# Patient Record
Sex: MALE | Race: WHITE | NOT HISPANIC OR LATINO | Employment: OTHER | URBAN - METROPOLITAN AREA
[De-identification: names, ages, dates, MRNs, and addresses within clinical notes are randomized per-mention and may not be internally consistent; named-entity substitution may affect disease eponyms.]

---

## 2023-06-20 LAB
ALBUMIN (G/DL) IN SER/PLAS: 3.9 G/DL (ref 3.4–5)
ALBUMIN (MG/L) IN URINE: 43.4 MG/L
ALBUMIN/CREATININE (UG/MG) IN URINE: 34.7 UG/MG CRT (ref 0–30)
ANION GAP IN SER/PLAS: 14 MMOL/L (ref 10–20)
CALCIDIOL (25 OH VITAMIN D3) (NG/ML) IN SER/PLAS: 76 NG/ML
CALCIUM (MG/DL) IN SER/PLAS: 9.3 MG/DL (ref 8.6–10.6)
CARBON DIOXIDE, TOTAL (MMOL/L) IN SER/PLAS: 21 MMOL/L (ref 21–32)
CHLORIDE (MMOL/L) IN SER/PLAS: 114 MMOL/L (ref 98–107)
CREATININE (MG/DL) IN SER/PLAS: 4.09 MG/DL (ref 0.5–1.3)
CREATININE (MG/DL) IN URINE: 125 MG/DL (ref 20–370)
ERYTHROCYTE DISTRIBUTION WIDTH (RATIO) BY AUTOMATED COUNT: 13.7 % (ref 11.5–14.5)
ERYTHROCYTE MEAN CORPUSCULAR HEMOGLOBIN CONCENTRATION (G/DL) BY AUTOMATED: 31.9 G/DL (ref 32–36)
ERYTHROCYTE MEAN CORPUSCULAR VOLUME (FL) BY AUTOMATED COUNT: 102 FL (ref 80–100)
ERYTHROCYTES (10*6/UL) IN BLOOD BY AUTOMATED COUNT: 3.73 X10E12/L (ref 4.5–5.9)
GFR MALE: 14 ML/MIN/1.73M2
GLUCOSE (MG/DL) IN SER/PLAS: 106 MG/DL (ref 74–99)
HEMATOCRIT (%) IN BLOOD BY AUTOMATED COUNT: 37.9 % (ref 41–52)
HEMOGLOBIN (G/DL) IN BLOOD: 12.1 G/DL (ref 13.5–17.5)
LEUKOCYTES (10*3/UL) IN BLOOD BY AUTOMATED COUNT: 9.1 X10E9/L (ref 4.4–11.3)
NRBC (PER 100 WBCS) BY AUTOMATED COUNT: 0 /100 WBC (ref 0–0)
PARATHYRIN INTACT (PG/ML) IN SER/PLAS: 213.7 PG/ML (ref 18.5–88)
PHOSPHATE (MG/DL) IN SER/PLAS: 3.3 MG/DL (ref 2.5–4.9)
PLATELETS (10*3/UL) IN BLOOD AUTOMATED COUNT: 170 X10E9/L (ref 150–450)
POTASSIUM (MMOL/L) IN SER/PLAS: 5.2 MMOL/L (ref 3.5–5.3)
SODIUM (MMOL/L) IN SER/PLAS: 144 MMOL/L (ref 136–145)
UREA NITROGEN (MG/DL) IN SER/PLAS: 59 MG/DL (ref 6–23)

## 2023-08-16 ENCOUNTER — HOSPITAL ENCOUNTER (OUTPATIENT)
Dept: DATA CONVERSION | Facility: HOSPITAL | Age: 78
Discharge: HOME | End: 2023-08-16
Payer: MEDICARE

## 2023-08-17 DIAGNOSIS — N20.1 CALCULUS OF URETER: ICD-10-CM

## 2023-08-24 ENCOUNTER — HOSPITAL ENCOUNTER (OUTPATIENT)
Dept: DATA CONVERSION | Facility: HOSPITAL | Age: 78
Discharge: HOME | End: 2023-08-24
Payer: MEDICARE

## 2023-08-24 DIAGNOSIS — N20.1 CALCULUS OF URETER: ICD-10-CM

## 2023-08-24 DIAGNOSIS — N13.5 CROSSING VESSEL AND STRICTURE OF URETER WITHOUT HYDRONEPHROSIS: ICD-10-CM

## 2023-08-24 PROBLEM — K92.1 MELENA: Status: ACTIVE | Noted: 2023-08-24

## 2023-08-24 PROBLEM — C61 ADENOCARCINOMA OF PROSTATE (MULTI): Status: ACTIVE | Noted: 2023-08-24

## 2023-08-24 PROBLEM — D50.0 ANEMIA DUE TO BLOOD LOSS: Status: ACTIVE | Noted: 2023-08-24

## 2023-08-24 PROBLEM — N40.0 BENIGN PROSTATIC HYPERPLASIA: Status: ACTIVE | Noted: 2023-08-24

## 2023-08-24 PROBLEM — K26.9 DUODENAL ULCER: Status: ACTIVE | Noted: 2023-08-24

## 2023-08-24 PROBLEM — N21.1 CALCULUS IN URETHRA: Status: ACTIVE | Noted: 2023-08-24

## 2023-08-24 PROBLEM — I49.5 SINUS NODE DYSFUNCTION (MULTI): Status: ACTIVE | Noted: 2023-08-24

## 2023-08-24 PROBLEM — K27.9 PEPTIC ULCER DISEASE: Status: ACTIVE | Noted: 2023-08-24

## 2023-08-24 PROBLEM — Z95.0 PRESENCE OF CARDIAC PACEMAKER: Status: ACTIVE | Noted: 2023-08-24

## 2023-08-24 PROBLEM — Z72.0 TOBACCO USE: Status: ACTIVE | Noted: 2023-08-24

## 2023-08-24 PROBLEM — K25.9 STOMACH ULCER: Status: ACTIVE | Noted: 2023-08-24

## 2023-08-24 PROBLEM — K21.9 GASTROESOPHAGEAL REFLUX DISEASE: Status: ACTIVE | Noted: 2023-08-24

## 2023-08-24 PROBLEM — Z86.69 HISTORY OF HEMIPARESIS: Status: ACTIVE | Noted: 2023-08-24

## 2023-08-24 PROBLEM — R31.9 HEMATURIA: Status: ACTIVE | Noted: 2023-08-24

## 2023-08-24 PROBLEM — E78.5 DYSLIPIDEMIA: Status: ACTIVE | Noted: 2023-08-24

## 2023-08-24 PROBLEM — E78.5 HYPERLIPIDEMIA: Status: ACTIVE | Noted: 2023-08-24

## 2023-08-24 PROBLEM — G35 MULTIPLE SCLEROSIS (MULTI): Status: ACTIVE | Noted: 2023-08-24

## 2023-08-24 PROBLEM — N28.9 ABNORMAL RENAL FUNCTION: Status: ACTIVE | Noted: 2023-08-24

## 2023-08-24 RX ORDER — TAMSULOSIN HYDROCHLORIDE 0.4 MG/1
1 CAPSULE ORAL NIGHTLY
COMMUNITY
Start: 2018-09-05

## 2023-08-24 RX ORDER — CHOLECALCIFEROL (VITAMIN D3) 25 MCG
25 TABLET ORAL DAILY
COMMUNITY

## 2023-08-24 RX ORDER — OMEPRAZOLE 20 MG/1
20 TABLET, DELAYED RELEASE ORAL DAILY
COMMUNITY

## 2023-08-24 RX ORDER — MULTIVIT-MIN/FA/LYCOPEN/LUTEIN .4-300-25
1 TABLET ORAL DAILY
COMMUNITY

## 2023-08-24 RX ORDER — ROSUVASTATIN CALCIUM 10 MG/1
10 TABLET, COATED ORAL DAILY
COMMUNITY

## 2023-08-24 RX ORDER — ROSUVASTATIN CALCIUM 40 MG/1
1 TABLET, COATED ORAL NIGHTLY
COMMUNITY
Start: 2020-06-15

## 2023-08-24 RX ORDER — FERROUS SULFATE 325(65) MG
TABLET ORAL
COMMUNITY
Start: 2022-07-20

## 2023-08-24 RX ORDER — FAMOTIDINE 20 MG/1
20 TABLET, FILM COATED ORAL NIGHTLY
COMMUNITY

## 2023-08-29 LAB
ALANINE AMINOTRANSFERASE (SGPT) (U/L) IN SER/PLAS: 5 U/L (ref 10–52)
ALBUMIN (G/DL) IN SER/PLAS: 3.8 G/DL (ref 3.4–5)
ANION GAP IN SER/PLAS: 12 MMOL/L (ref 10–20)
ASPARTATE AMINOTRANSFERASE (SGOT) (U/L) IN SER/PLAS: 11 U/L (ref 9–39)
CALCIUM (MG/DL) IN SER/PLAS: 9.4 MG/DL (ref 8.6–10.6)
CARBON DIOXIDE, TOTAL (MMOL/L) IN SER/PLAS: 23 MMOL/L (ref 21–32)
CHLORIDE (MMOL/L) IN SER/PLAS: 114 MMOL/L (ref 98–107)
CHOLESTEROL (MG/DL) IN SER/PLAS: 114 MG/DL (ref 0–199)
CHOLESTEROL IN HDL (MG/DL) IN SER/PLAS: 38.4 MG/DL
CHOLESTEROL/HDL RATIO: 3
CREATININE (MG/DL) IN SER/PLAS: 4.45 MG/DL (ref 0.5–1.3)
GFR MALE: 13 ML/MIN/1.73M2
GLUCOSE (MG/DL) IN SER/PLAS: 92 MG/DL (ref 74–99)
LDL: 55 MG/DL (ref 0–99)
PHOSPHATE (MG/DL) IN SER/PLAS: 3.4 MG/DL (ref 2.5–4.9)
POTASSIUM (MMOL/L) IN SER/PLAS: 5.3 MMOL/L (ref 3.5–5.3)
SODIUM (MMOL/L) IN SER/PLAS: 144 MMOL/L (ref 136–145)
TRIGLYCERIDE (MG/DL) IN SER/PLAS: 104 MG/DL (ref 0–149)
UREA NITROGEN (MG/DL) IN SER/PLAS: 56 MG/DL (ref 6–23)
VLDL: 21 MG/DL (ref 0–40)

## 2023-09-20 LAB
ALBUMIN (G/DL) IN SER/PLAS: 3.8 G/DL (ref 3.4–5)
ANION GAP IN SER/PLAS: 13 MMOL/L (ref 10–20)
CALCIUM (MG/DL) IN SER/PLAS: 9.2 MG/DL (ref 8.6–10.6)
CARBON DIOXIDE, TOTAL (MMOL/L) IN SER/PLAS: 23 MMOL/L (ref 21–32)
CHLORIDE (MMOL/L) IN SER/PLAS: 114 MMOL/L (ref 98–107)
CREATININE (MG/DL) IN SER/PLAS: 3.98 MG/DL (ref 0.5–1.3)
GFR MALE: 14 ML/MIN/1.73M2
GLUCOSE (MG/DL) IN SER/PLAS: 101 MG/DL (ref 74–99)
PHOSPHATE (MG/DL) IN SER/PLAS: 3.3 MG/DL (ref 2.5–4.9)
POTASSIUM (MMOL/L) IN SER/PLAS: 4.8 MMOL/L (ref 3.5–5.3)
SODIUM (MMOL/L) IN SER/PLAS: 145 MMOL/L (ref 136–145)
UREA NITROGEN (MG/DL) IN SER/PLAS: 57 MG/DL (ref 6–23)

## 2023-10-02 ENCOUNTER — ANCILLARY PROCEDURE (OUTPATIENT)
Dept: RADIOLOGY | Facility: CLINIC | Age: 78
End: 2023-10-02
Payer: MEDICARE

## 2023-10-02 DIAGNOSIS — N18.5 CHRONIC KIDNEY DISEASE, STAGE 5 (MULTI): ICD-10-CM

## 2023-10-02 DIAGNOSIS — N13.8 OTHER OBSTRUCTIVE AND REFLUX UROPATHY: ICD-10-CM

## 2023-10-02 PROCEDURE — 76770 US EXAM ABDO BACK WALL COMP: CPT | Performed by: RADIOLOGY

## 2023-10-02 PROCEDURE — 76770 US EXAM ABDO BACK WALL COMP: CPT

## 2023-10-19 DIAGNOSIS — Z95.0 PACEMAKER: ICD-10-CM

## 2023-10-19 DIAGNOSIS — I49.5 SSS (SICK SINUS SYNDROME) (MULTI): Primary | ICD-10-CM

## 2023-10-25 ENCOUNTER — TELEPHONE (OUTPATIENT)
Dept: CARDIOLOGY | Facility: HOSPITAL | Age: 78
End: 2023-10-25
Payer: COMMERCIAL

## 2023-11-09 ENCOUNTER — OFFICE VISIT (OUTPATIENT)
Dept: NEUROLOGY | Facility: CLINIC | Age: 78
End: 2023-11-09
Payer: MEDICARE

## 2023-11-09 VITALS
HEIGHT: 66 IN | HEART RATE: 63 BPM | SYSTOLIC BLOOD PRESSURE: 117 MMHG | BODY MASS INDEX: 28.41 KG/M2 | DIASTOLIC BLOOD PRESSURE: 62 MMHG

## 2023-11-09 DIAGNOSIS — G35 MULTIPLE SCLEROSIS (MULTI): Primary | ICD-10-CM

## 2023-11-09 PROCEDURE — 99214 OFFICE O/P EST MOD 30 MIN: CPT | Performed by: PSYCHIATRY & NEUROLOGY

## 2023-11-09 PROCEDURE — 4004F PT TOBACCO SCREEN RCVD TLK: CPT | Performed by: PSYCHIATRY & NEUROLOGY

## 2023-11-09 NOTE — PROGRESS NOTES
Subjective     Antonio Willis is a 78 y.o. year old male seen in follow-up for chronic multiple sclerosis    HPI    78-year-old right-handed  man with past medical history significant for pacemaker placed in 2011, dyslipidemia, prostate cancer, ankle fracture, hernia repair and ongoing smoking.     I have followed him since November 2014, inheriting his care from Dr. Alan Booth, for a long history of multiple sclerosis dating possibly as far back as 1970 when he had a prolonged episode of vertigo. He has had more than 15 years of right hemibody motor dysfunction. He hasn't undergone MRIs since 2011 because of the pacemaker, and has not been on disease modifying therapy. At a visit in November 2018 I discussed the option of a referral to an MS subspecialist to consider Ocrevus, but he did not wish to pursue this.     I evaluated him most recently on 11/11/2022 in the office.  He appeared neurologically stable at that time.  We discussed the option of a course of balance therapy but he was not inclined to pursue it.    He is evaluated again today in the office, accompanied by his wife.    He indicates no interval falls since the last visit.  Subjectively his strength is stable with a baseline right hemiparesis that has not appreciably worsened.    He is using either a walker or a cane at home.  Today he presents in a manual wheelchair without other assistive devices.  His wife indicates that he requires the wheelchair for any kind of longer distance.  He also has a power wheelchair at home.    He does no regular exercise, indicating that he hates exercise.  He has not done physical therapy recently.    He denies new areas of focal sensory disturbance.    He denies headaches, lightheadedness or vertigo.  He has noted no recent vision change.    He reports good quality of sleep; typically gets up twice a night to urinate.  He uses a walker when he is getting out of bed in the middle of the night.    He  reports improved bladder function since stenting for kidney stones associated with hydronephrosis.  He has history of kidney stones goes back about 40 years and they are no longer associated with pain.    He is due for a new pacemaker this year.  He saw cardiology recently.    He reports weight has been stable.    He continues smoking, indicating that he is down to 2 cigars/day.       Review of Systems    As per the history of present illness    Patient Active Problem List   Diagnosis    Abnormal renal function    Adenocarcinoma of prostate (CMS/[facility])    Anemia due to blood loss    Benign prostatic hyperplasia    Calculus in urethra    Dyslipidemia    Gastroesophageal reflux disease    Hematuria    Hyperlipidemia    Melena    Multiple sclerosis (CMS/HCC)    Presence of cardiac pacemaker    Sinus node dysfunction (CMS/[facility])    Duodenal ulcer    Peptic ulcer disease    Stomach ulcer    Ureteric stone    Tobacco use    History of hemiparesis     Past Medical History:   Diagnosis Date    Acute gastric ulcer without hemorrhage or perforation 05/20/2019    Acute gastric ulcer    Hemiplegia, unspecified affecting right dominant side (CMS/[facility]) 05/20/2019    Hemiparesis, right    Hyperlipidemia, unspecified 07/20/2022    Hyperlipidemia    Other conditions influencing health status     Urinary Tract Infection    Other conditions influencing health status     Nephrolithiasis    Other conditions influencing health status 10/24/2013    Prostate Cancer    Pain in right hip 05/20/2019    Greater trochanteric pain syndrome of right lower extremity    Personal history of other diseases of the digestive system     History of constipation    Personal history of other diseases of the musculoskeletal system and connective tissue     History of low back pain    Personal history of other specified conditions     History of dizziness    Personal history of other specified conditions     History of vertigo    Personal history of urinary  calculi 05/31/2018    History of renal calculi    Tremor, unspecified     Tremor     Past Surgical History:   Procedure Laterality Date    HERNIA REPAIR  08/20/2013    Hernia Repair    IR  NEPHROSTOMY PLACEMENT  11/24/2021    IR  NEPHROSTOMY PLACEMENT 11/24/2021 [facility]    OTHER SURGICAL HISTORY  08/20/2013    Closed Treatment Of Trimalleolar Ankle Fracture    US GUIDED ABSCESS DRAIN  11/24/2021    US GUIDED ABSCESS DRAIN 11/24/2021 [facility]     Social History     Tobacco Use    Smoking status: Not on file    Smokeless tobacco: Not on file   Substance Use Topics    Alcohol use: Not on file     family history includes Diabetes in his father and sister; Heart attack in his father; Leukemia in his brother and mother; Parkinsonism in his father's sister; Tremor in his sister.    Current Outpatient Medications:     cholecalciferol (Vitamin D-3) 25 MCG (1000 UT) tablet, Take 1 tablet (25 mcg) by mouth once daily., Disp: , Rfl:     famotidine (Pepcid) 20 mg tablet, Take 1 tablet (20 mg) by mouth once daily at bedtime., Disp: , Rfl:     ferrous sulfate 325 (65 Fe) MG tablet, Iron 325 (65 Fe) MG Oral Tablet  Refills: 0      Start : 20-Jul-2022  Active, Disp: , Rfl:     multivit-iron-minerals-folic acid (Centrum Silver) 0.4 mg-300 mcg- 250 mcg tab, Take 1 tablet by mouth once daily., Disp: , Rfl:     omeprazole OTC (PriLOSEC OTC) 20 mg EC tablet, Take 1 tablet (20 mg) by mouth once daily., Disp: , Rfl:     rosuvastatin (Crestor) 10 mg tablet, Take 1 tablet (10 mg) by mouth once daily., Disp: , Rfl:     rosuvastatin (Crestor) 40 mg tablet, Take 1 tablet (40 mg) by mouth once daily at bedtime., Disp: , Rfl:     tamsulosin (Flomax) 0.4 mg 24 hr capsule, Take 1 capsule (0.4 mg) by mouth once daily at bedtime., Disp: , Rfl:   No Known Allergies    Objective   Neurological Exam  Physical Exam    General: Alert man who presented in a manual wheelchair.  No walker or cane was brought with him.    Mental Status: Clear  sensorium without fluctuation.  Appropriate in conversation.  Fluent unremarkable speech without paraphasic errors or hesitancy.  Followed instructions accurately on exam.    Cranial Nerves:  Funduscopic exam was not well visualized bilaterally on nondilated exam.  Pupils were equal, round and reactive to light with no relative afferent pupillary defect.  Extraocular movements were intact and conjugate without nystagmus.  No ptosis.  Visual fields were full to confrontation tested binocularly.  Facial sensation was symmetric to pin.  Facial motor function was symmetrically intact.  Hearing was grossly intact.  No dysarthria.  Shoulder shrug was symmetric.  Tongue protrusion was midline.    Motor: Muscle tone was normal throughout.  There was no pronator drift.  He again exhibited a right hemiparesis with middle deltoid 4/5 (and limited range of abduction at the right shoulder to about 45 degrees), biceps 4, triceps 4-4+, finger extension 4 - to 4, hip flexion 4-4+, quadriceps 5, ankle dorsiflexion 4+.  Left middle deltoid was 4+ (with active range of abduction about 60 degrees), biceps 5, triceps 4-4+, finger extensors 5, lower extremity 5.    Coordination: Finger to finger was accurate bilaterally without dysmetria or intention tremor.  There was a mild left and trace right rapid low amplitude postural-kinetic tremor.  No rest tremor, myoclonus or dystonic posturing.    Sensation: Vibration thresholds were normal at the index fingers.    Gait: Observed over only a few steps entheses from wheelchair to exam table) due to lack of a walker or cane.  He held my hand for balance assist but did not require weightbearing assist.  Mildly flexed arthritic posture without apraxia/freezing.    Assessment/Plan     He appears neurologically stable from the standpoint of longstanding multiple sclerosis.  He has a baseline right hemiparesis that has not worsened compared to the prior exam 1 year ago.  He has had no interval  neurologic events.    He indicates no interval falls.  I reminded him to continue using the walker or at least a cane at all times to reduce risk of falls.  I discussed that the walker provides much better protection against falls than a cane does.    His wife encouraged him to do some physical therapy but he did not wish to consider it at this time.  I advised him to contact the office if he changes his mind and would like a referral for either outpatient or in-home PT.    I discussed smoking cessation as he indicates was also discussed recently with his cardiologist.    I advised him to follow-up in the office in 1 year.

## 2023-11-09 NOTE — PATIENT INSTRUCTIONS
I am glad to hear that you have been doing well from a neurologic standpoint.    Your exam is stable today.    We discussed the importance of continuing to use the walker or cane at all times to reduce risk of falls.  As we discussed, the walker provides more stability and protection against falls Lanacane does.    We discussed the option of physical therapy.  Contact my office if you would like an order for this.    Otherwise please see me in the office in 1 year.

## 2023-11-21 ENCOUNTER — HOSPITAL ENCOUNTER (OUTPATIENT)
Dept: CARDIOLOGY | Facility: CLINIC | Age: 78
Discharge: HOME | End: 2023-11-21
Payer: MEDICARE

## 2023-11-21 DIAGNOSIS — Z95.0 PRESENCE OF CARDIAC PACEMAKER: ICD-10-CM

## 2023-11-21 DIAGNOSIS — I49.5 SICK SINUS SYNDROME (MULTI): ICD-10-CM

## 2023-12-19 ENCOUNTER — HOSPITAL ENCOUNTER (OUTPATIENT)
Dept: CARDIOLOGY | Facility: CLINIC | Age: 78
Discharge: HOME | End: 2023-12-19
Payer: MEDICARE

## 2023-12-19 DIAGNOSIS — Z95.0 PACEMAKER: ICD-10-CM

## 2023-12-19 DIAGNOSIS — I49.5 SICK SINUS SYNDROME (MULTI): ICD-10-CM

## 2024-01-23 ENCOUNTER — HOSPITAL ENCOUNTER (OUTPATIENT)
Dept: CARDIOLOGY | Facility: CLINIC | Age: 79
Discharge: HOME | End: 2024-01-23
Payer: MEDICARE

## 2024-01-23 DIAGNOSIS — I49.5 SICK SINUS SYNDROME (MULTI): ICD-10-CM

## 2024-01-23 DIAGNOSIS — Z95.0 PRESENCE OF CARDIAC PACEMAKER: ICD-10-CM

## 2024-01-23 PROCEDURE — 93294 REM INTERROG EVL PM/LDLS PM: CPT | Performed by: INTERNAL MEDICINE

## 2024-01-23 PROCEDURE — 93296 REM INTERROG EVL PM/IDS: CPT

## 2024-02-26 ENCOUNTER — HOSPITAL ENCOUNTER (OUTPATIENT)
Dept: CARDIOLOGY | Facility: CLINIC | Age: 79
Discharge: HOME | End: 2024-02-26
Payer: MEDICARE

## 2024-02-26 DIAGNOSIS — Z95.810 PRESENCE OF AUTOMATIC (IMPLANTABLE) CARDIAC DEFIBRILLATOR: ICD-10-CM

## 2024-02-26 DIAGNOSIS — I42.9 CARDIOMYOPATHY (MULTI): ICD-10-CM

## 2024-03-15 ENCOUNTER — HOSPITAL ENCOUNTER (OUTPATIENT)
Dept: CARDIOLOGY | Facility: CLINIC | Age: 79
Discharge: HOME | End: 2024-03-15
Payer: MEDICARE

## 2024-03-15 DIAGNOSIS — I49.5 SICK SINUS SYNDROME (MULTI): ICD-10-CM

## 2024-03-15 DIAGNOSIS — Z95.0 PRESENCE OF CARDIAC PACEMAKER: ICD-10-CM

## 2024-04-19 ENCOUNTER — HOSPITAL ENCOUNTER (OUTPATIENT)
Dept: CARDIOLOGY | Facility: CLINIC | Age: 79
Discharge: HOME | End: 2024-04-19
Payer: MEDICARE

## 2024-04-19 DIAGNOSIS — Z95.0 CARDIAC PACEMAKER IN SITU: ICD-10-CM

## 2024-04-19 DIAGNOSIS — I49.5 SSS (SICK SINUS SYNDROME) (MULTI): ICD-10-CM

## 2024-05-28 ENCOUNTER — HOSPITAL ENCOUNTER (OUTPATIENT)
Dept: CARDIOLOGY | Facility: CLINIC | Age: 79
Discharge: HOME | End: 2024-05-28
Payer: MEDICARE

## 2024-05-28 DIAGNOSIS — I49.5 SICK SINUS SYNDROME (MULTI): ICD-10-CM

## 2024-05-28 DIAGNOSIS — Z95.0 PRESENCE OF CARDIAC PACEMAKER: ICD-10-CM

## 2024-06-13 ENCOUNTER — HOSPITAL ENCOUNTER (INPATIENT)
Facility: HOSPITAL | Age: 79
LOS: 2 days | Discharge: HOME | End: 2024-06-15
Attending: EMERGENCY MEDICINE | Admitting: EMERGENCY MEDICINE
Payer: MEDICARE

## 2024-06-13 ENCOUNTER — APPOINTMENT (OUTPATIENT)
Dept: RADIOLOGY | Facility: HOSPITAL | Age: 79
End: 2024-06-13
Payer: MEDICARE

## 2024-06-13 ENCOUNTER — APPOINTMENT (OUTPATIENT)
Dept: CARDIOLOGY | Facility: HOSPITAL | Age: 79
End: 2024-06-13
Payer: MEDICARE

## 2024-06-13 DIAGNOSIS — N13.30 HYDRONEPHROSIS DETERMINED BY ULTRASOUND: ICD-10-CM

## 2024-06-13 DIAGNOSIS — N39.0 URINARY TRACT INFECTION IN ELDERLY PATIENT: Primary | ICD-10-CM

## 2024-06-13 DIAGNOSIS — N13.2 HYDRONEPHROSIS WITH RENAL AND URETERAL CALCULOUS OBSTRUCTION: ICD-10-CM

## 2024-06-13 DIAGNOSIS — R09.02 HYPOXEMIA: ICD-10-CM

## 2024-06-13 LAB
ALBUMIN SERPL BCP-MCNC: 3.7 G/DL (ref 3.4–5)
ALP SERPL-CCNC: 108 U/L (ref 33–136)
ALT SERPL W P-5'-P-CCNC: 12 U/L (ref 10–52)
ANION GAP SERPL CALC-SCNC: 14 MMOL/L (ref 10–20)
APPEARANCE UR: CLEAR
AST SERPL W P-5'-P-CCNC: 14 U/L (ref 9–39)
BASOPHILS # BLD AUTO: 0.05 X10*3/UL (ref 0–0.1)
BASOPHILS NFR BLD AUTO: 0.5 %
BILIRUB SERPL-MCNC: 0.5 MG/DL (ref 0–1.2)
BILIRUB UR STRIP.AUTO-MCNC: NEGATIVE MG/DL
BUN SERPL-MCNC: 60 MG/DL (ref 6–23)
CALCIUM SERPL-MCNC: 9.5 MG/DL (ref 8.6–10.3)
CHLORIDE SERPL-SCNC: 117 MMOL/L (ref 98–107)
CO2 SERPL-SCNC: 19 MMOL/L (ref 21–32)
COLOR UR: YELLOW
CREAT SERPL-MCNC: 4.19 MG/DL (ref 0.5–1.3)
EGFRCR SERPLBLD CKD-EPI 2021: 14 ML/MIN/1.73M*2
EOSINOPHIL # BLD AUTO: 0.29 X10*3/UL (ref 0–0.4)
EOSINOPHIL NFR BLD AUTO: 3 %
ERYTHROCYTE [DISTWIDTH] IN BLOOD BY AUTOMATED COUNT: 14.8 % (ref 11.5–14.5)
GLUCOSE SERPL-MCNC: 80 MG/DL (ref 74–99)
GLUCOSE UR STRIP.AUTO-MCNC: NEGATIVE MG/DL
HCT VFR BLD AUTO: 38.4 % (ref 41–52)
HGB BLD-MCNC: 12.2 G/DL (ref 13.5–17.5)
IMM GRANULOCYTES # BLD AUTO: 0.03 X10*3/UL (ref 0–0.5)
IMM GRANULOCYTES NFR BLD AUTO: 0.3 % (ref 0–0.9)
KETONES UR STRIP.AUTO-MCNC: NEGATIVE MG/DL
LEUKOCYTE ESTERASE UR QL STRIP.AUTO: ABNORMAL
LYMPHOCYTES # BLD AUTO: 1.25 X10*3/UL (ref 0.8–3)
LYMPHOCYTES NFR BLD AUTO: 12.9 %
MCH RBC QN AUTO: 31.3 PG (ref 26–34)
MCHC RBC AUTO-ENTMCNC: 31.8 G/DL (ref 32–36)
MCV RBC AUTO: 99 FL (ref 80–100)
MONOCYTES # BLD AUTO: 0.68 X10*3/UL (ref 0.05–0.8)
MONOCYTES NFR BLD AUTO: 7 %
NEUTROPHILS # BLD AUTO: 7.41 X10*3/UL (ref 1.6–5.5)
NEUTROPHILS NFR BLD AUTO: 76.3 %
NITRITE UR QL STRIP.AUTO: POSITIVE
NRBC BLD-RTO: ABNORMAL /100{WBCS}
PH UR STRIP.AUTO: 6 [PH]
PLATELET # BLD AUTO: 168 X10*3/UL (ref 150–450)
POTASSIUM SERPL-SCNC: 5 MMOL/L (ref 3.5–5.3)
POTASSIUM SERPL-SCNC: 5.5 MMOL/L (ref 3.5–5.3)
PROT SERPL-MCNC: 6.7 G/DL (ref 6.4–8.2)
PROT UR STRIP.AUTO-MCNC: ABNORMAL MG/DL
PTH-INTACT SERPL-MCNC: 212.7 PG/ML (ref 18.5–88)
RBC # BLD AUTO: 3.9 X10*6/UL (ref 4.5–5.9)
RBC # UR STRIP.AUTO: ABNORMAL /UL
RBC #/AREA URNS AUTO: ABNORMAL /HPF
SODIUM SERPL-SCNC: 144 MMOL/L (ref 136–145)
SP GR UR STRIP.AUTO: 1.02
UROBILINOGEN UR STRIP.AUTO-MCNC: 0.2 MG/DL
WBC # BLD AUTO: 9.7 X10*3/UL (ref 4.4–11.3)
WBC #/AREA URNS AUTO: >50 /HPF
WBC CLUMPS #/AREA URNS AUTO: ABNORMAL /HPF

## 2024-06-13 PROCEDURE — 2500000004 HC RX 250 GENERAL PHARMACY W/ HCPCS (ALT 636 FOR OP/ED): Performed by: EMERGENCY MEDICINE

## 2024-06-13 PROCEDURE — 81001 URINALYSIS AUTO W/SCOPE: CPT | Performed by: EMERGENCY MEDICINE

## 2024-06-13 PROCEDURE — 2500000004 HC RX 250 GENERAL PHARMACY W/ HCPCS (ALT 636 FOR OP/ED): Performed by: PHYSICIAN ASSISTANT

## 2024-06-13 PROCEDURE — 96374 THER/PROPH/DIAG INJ IV PUSH: CPT

## 2024-06-13 PROCEDURE — 84154 ASSAY OF PSA FREE: CPT | Performed by: EMERGENCY MEDICINE

## 2024-06-13 PROCEDURE — 99285 EMERGENCY DEPT VISIT HI MDM: CPT | Mod: 25

## 2024-06-13 PROCEDURE — 76770 US EXAM ABDO BACK WALL COMP: CPT

## 2024-06-13 PROCEDURE — 84132 ASSAY OF SERUM POTASSIUM: CPT | Performed by: EMERGENCY MEDICINE

## 2024-06-13 PROCEDURE — 93005 ELECTROCARDIOGRAM TRACING: CPT

## 2024-06-13 PROCEDURE — 82306 VITAMIN D 25 HYDROXY: CPT | Mod: BEALAB,WESLAB | Performed by: EMERGENCY MEDICINE

## 2024-06-13 PROCEDURE — 84075 ASSAY ALKALINE PHOSPHATASE: CPT | Performed by: EMERGENCY MEDICINE

## 2024-06-13 PROCEDURE — 1100000001 HC PRIVATE ROOM DAILY

## 2024-06-13 PROCEDURE — 87086 URINE CULTURE/COLONY COUNT: CPT | Mod: BEALAB | Performed by: EMERGENCY MEDICINE

## 2024-06-13 PROCEDURE — 76770 US EXAM ABDO BACK WALL COMP: CPT | Performed by: RADIOLOGY

## 2024-06-13 PROCEDURE — 51798 US URINE CAPACITY MEASURE: CPT

## 2024-06-13 PROCEDURE — 83970 ASSAY OF PARATHORMONE: CPT | Mod: BEALAB | Performed by: EMERGENCY MEDICINE

## 2024-06-13 PROCEDURE — 36415 COLL VENOUS BLD VENIPUNCTURE: CPT | Performed by: EMERGENCY MEDICINE

## 2024-06-13 PROCEDURE — 2500000002 HC RX 250 W HCPCS SELF ADMINISTERED DRUGS (ALT 637 FOR MEDICARE OP, ALT 636 FOR OP/ED): Mod: MUE | Performed by: PHYSICIAN ASSISTANT

## 2024-06-13 PROCEDURE — 85025 COMPLETE CBC W/AUTO DIFF WBC: CPT | Performed by: EMERGENCY MEDICINE

## 2024-06-13 RX ORDER — ACETAMINOPHEN 650 MG/1
650 SUPPOSITORY RECTAL EVERY 4 HOURS PRN
Status: DISCONTINUED | OUTPATIENT
Start: 2024-06-13 | End: 2024-06-15 | Stop reason: HOSPADM

## 2024-06-13 RX ORDER — POLYETHYLENE GLYCOL 3350 17 G/17G
17 POWDER, FOR SOLUTION ORAL DAILY
Status: DISCONTINUED | OUTPATIENT
Start: 2024-06-14 | End: 2024-06-15 | Stop reason: HOSPADM

## 2024-06-13 RX ORDER — HEPARIN SODIUM 5000 [USP'U]/ML
5000 INJECTION, SOLUTION INTRAVENOUS; SUBCUTANEOUS 2 TIMES DAILY
Status: DISCONTINUED | OUTPATIENT
Start: 2024-06-13 | End: 2024-06-15 | Stop reason: HOSPADM

## 2024-06-13 RX ORDER — FAMOTIDINE 20 MG/1
20 TABLET, FILM COATED ORAL DAILY
Status: DISCONTINUED | OUTPATIENT
Start: 2024-06-14 | End: 2024-06-15 | Stop reason: HOSPADM

## 2024-06-13 RX ORDER — ONDANSETRON 4 MG/1
4 TABLET, ORALLY DISINTEGRATING ORAL EVERY 8 HOURS PRN
Status: DISCONTINUED | OUTPATIENT
Start: 2024-06-13 | End: 2024-06-15 | Stop reason: HOSPADM

## 2024-06-13 RX ORDER — ONDANSETRON HYDROCHLORIDE 2 MG/ML
4 INJECTION, SOLUTION INTRAVENOUS EVERY 8 HOURS PRN
Status: DISCONTINUED | OUTPATIENT
Start: 2024-06-13 | End: 2024-06-15 | Stop reason: HOSPADM

## 2024-06-13 RX ORDER — CEFTRIAXONE 1 G/50ML
1 INJECTION, SOLUTION INTRAVENOUS ONCE
Status: COMPLETED | OUTPATIENT
Start: 2024-06-13 | End: 2024-06-13

## 2024-06-13 RX ORDER — FERROUS SULFATE 325(65) MG
65 TABLET ORAL DAILY
Status: DISCONTINUED | OUTPATIENT
Start: 2024-06-14 | End: 2024-06-15 | Stop reason: HOSPADM

## 2024-06-13 RX ORDER — HEPARIN SODIUM 5000 [USP'U]/ML
5000 INJECTION, SOLUTION INTRAVENOUS; SUBCUTANEOUS EVERY 8 HOURS SCHEDULED
Status: DISCONTINUED | OUTPATIENT
Start: 2024-06-13 | End: 2024-06-13

## 2024-06-13 RX ORDER — FAMOTIDINE 20 MG/1
20 TABLET, FILM COATED ORAL NIGHTLY
Status: DISCONTINUED | OUTPATIENT
Start: 2024-06-13 | End: 2024-06-13

## 2024-06-13 RX ORDER — SODIUM CHLORIDE 9 MG/ML
75 INJECTION, SOLUTION INTRAVENOUS CONTINUOUS
Status: DISCONTINUED | OUTPATIENT
Start: 2024-06-13 | End: 2024-06-15 | Stop reason: HOSPADM

## 2024-06-13 RX ORDER — CEFTRIAXONE 1 G/50ML
1 INJECTION, SOLUTION INTRAVENOUS EVERY 24 HOURS
Status: DISCONTINUED | OUTPATIENT
Start: 2024-06-14 | End: 2024-06-15 | Stop reason: HOSPADM

## 2024-06-13 RX ORDER — SIMETHICONE 80 MG
80 TABLET,CHEWABLE ORAL 4 TIMES DAILY PRN
Status: DISCONTINUED | OUTPATIENT
Start: 2024-06-13 | End: 2024-06-15 | Stop reason: HOSPADM

## 2024-06-13 RX ORDER — ACETAMINOPHEN 160 MG/5ML
650 SOLUTION ORAL EVERY 4 HOURS PRN
Status: DISCONTINUED | OUTPATIENT
Start: 2024-06-13 | End: 2024-06-15 | Stop reason: HOSPADM

## 2024-06-13 RX ORDER — PROMETHAZINE HYDROCHLORIDE 25 MG/1
25 SUPPOSITORY RECTAL EVERY 12 HOURS PRN
Status: DISCONTINUED | OUTPATIENT
Start: 2024-06-13 | End: 2024-06-15 | Stop reason: HOSPADM

## 2024-06-13 RX ORDER — TAMSULOSIN HYDROCHLORIDE 0.4 MG/1
0.4 CAPSULE ORAL NIGHTLY
Status: DISCONTINUED | OUTPATIENT
Start: 2024-06-13 | End: 2024-06-15 | Stop reason: HOSPADM

## 2024-06-13 RX ORDER — TALC
3 POWDER (GRAM) TOPICAL NIGHTLY PRN
Status: DISCONTINUED | OUTPATIENT
Start: 2024-06-13 | End: 2024-06-15 | Stop reason: HOSPADM

## 2024-06-13 RX ORDER — PROMETHAZINE HYDROCHLORIDE 25 MG/1
12.5 TABLET ORAL EVERY 6 HOURS PRN
Status: DISCONTINUED | OUTPATIENT
Start: 2024-06-13 | End: 2024-06-15 | Stop reason: HOSPADM

## 2024-06-13 RX ORDER — ACETAMINOPHEN 325 MG/1
650 TABLET ORAL EVERY 4 HOURS PRN
Status: DISCONTINUED | OUTPATIENT
Start: 2024-06-13 | End: 2024-06-15 | Stop reason: HOSPADM

## 2024-06-13 SDOH — SOCIAL STABILITY: SOCIAL INSECURITY: WERE YOU ABLE TO COMPLETE ALL THE BEHAVIORAL HEALTH SCREENINGS?: YES

## 2024-06-13 SDOH — SOCIAL STABILITY: SOCIAL INSECURITY: DOES ANYONE TRY TO KEEP YOU FROM HAVING/CONTACTING OTHER FRIENDS OR DOING THINGS OUTSIDE YOUR HOME?: NO

## 2024-06-13 SDOH — SOCIAL STABILITY: SOCIAL INSECURITY: ARE YOU OR HAVE YOU BEEN THREATENED OR ABUSED PHYSICALLY, EMOTIONALLY, OR SEXUALLY BY ANYONE?: NO

## 2024-06-13 SDOH — SOCIAL STABILITY: SOCIAL INSECURITY: DO YOU FEEL UNSAFE GOING BACK TO THE PLACE WHERE YOU ARE LIVING?: NO

## 2024-06-13 SDOH — SOCIAL STABILITY: SOCIAL INSECURITY: HAVE YOU HAD THOUGHTS OF HARMING ANYONE ELSE?: NO

## 2024-06-13 SDOH — SOCIAL STABILITY: SOCIAL INSECURITY: DO YOU FEEL ANYONE HAS EXPLOITED OR TAKEN ADVANTAGE OF YOU FINANCIALLY OR OF YOUR PERSONAL PROPERTY?: NO

## 2024-06-13 SDOH — ECONOMIC STABILITY: INCOME INSECURITY: HOW HARD IS IT FOR YOU TO PAY FOR THE VERY BASICS LIKE FOOD, HOUSING, MEDICAL CARE, AND HEATING?: NOT HARD AT ALL

## 2024-06-13 SDOH — ECONOMIC STABILITY: TRANSPORTATION INSECURITY
IN THE PAST 12 MONTHS, HAS LACK OF TRANSPORTATION KEPT YOU FROM MEETINGS, WORK, OR FROM GETTING THINGS NEEDED FOR DAILY LIVING?: NO

## 2024-06-13 SDOH — SOCIAL STABILITY: SOCIAL INSECURITY: HAS ANYONE EVER THREATENED TO HURT YOUR FAMILY OR YOUR PETS?: NO

## 2024-06-13 SDOH — SOCIAL STABILITY: SOCIAL INSECURITY: ARE THERE ANY APPARENT SIGNS OF INJURIES/BEHAVIORS THAT COULD BE RELATED TO ABUSE/NEGLECT?: NO

## 2024-06-13 SDOH — SOCIAL STABILITY: SOCIAL INSECURITY: ABUSE: ADULT

## 2024-06-13 SDOH — SOCIAL STABILITY: SOCIAL INSECURITY: HAVE YOU HAD ANY THOUGHTS OF HARMING ANYONE ELSE?: NO

## 2024-06-13 ASSESSMENT — LIFESTYLE VARIABLES
SKIP TO QUESTIONS 9-10: 1
HOW MANY STANDARD DRINKS CONTAINING ALCOHOL DO YOU HAVE ON A TYPICAL DAY: 1 OR 2
AUDIT-C TOTAL SCORE: 1
HOW OFTEN DO YOU HAVE A DRINK CONTAINING ALCOHOL: MONTHLY OR LESS
HOW OFTEN DO YOU HAVE 6 OR MORE DRINKS ON ONE OCCASION: NEVER
AUDIT-C TOTAL SCORE: 1

## 2024-06-13 ASSESSMENT — ACTIVITIES OF DAILY LIVING (ADL)
TOILETING: INDEPENDENT
JUDGMENT_ADEQUATE_SAFELY_COMPLETE_DAILY_ACTIVITIES: YES
DRESSING YOURSELF: INDEPENDENT
FEEDING YOURSELF: INDEPENDENT
PATIENT'S MEMORY ADEQUATE TO SAFELY COMPLETE DAILY ACTIVITIES?: YES
EFFECT OF PAIN ON DAILY ACTIVITIES: UNABLE TO URINATE
HEARING - RIGHT EAR: FUNCTIONAL
ASSISTIVE_DEVICE: CANE;WALKER
GROOMING: INDEPENDENT
HEARING - LEFT EAR: FUNCTIONAL
ADEQUATE_TO_COMPLETE_ADL: YES
LACK_OF_TRANSPORTATION: NO
WALKS IN HOME: INDEPENDENT
BATHING: INDEPENDENT

## 2024-06-13 ASSESSMENT — COGNITIVE AND FUNCTIONAL STATUS - GENERAL
DRESSING REGULAR LOWER BODY CLOTHING: A LITTLE
STANDING UP FROM CHAIR USING ARMS: A LITTLE
CLIMB 3 TO 5 STEPS WITH RAILING: A LITTLE
MOVING TO AND FROM BED TO CHAIR: A LITTLE
MOBILITY SCORE: 18
WALKING IN HOSPITAL ROOM: A LITTLE
WALKING IN HOSPITAL ROOM: A LITTLE
DAILY ACTIVITIY SCORE: 22
DRESSING REGULAR LOWER BODY CLOTHING: A LITTLE
TURNING FROM BACK TO SIDE WHILE IN FLAT BAD: A LITTLE
MOBILITY SCORE: 18
TURNING FROM BACK TO SIDE WHILE IN FLAT BAD: A LITTLE
MOVING FROM LYING ON BACK TO SITTING ON SIDE OF FLAT BED WITH BEDRAILS: A LITTLE
STANDING UP FROM CHAIR USING ARMS: A LITTLE
TOILETING: A LITTLE
TOILETING: A LITTLE
DAILY ACTIVITIY SCORE: 22
CLIMB 3 TO 5 STEPS WITH RAILING: A LITTLE
MOVING TO AND FROM BED TO CHAIR: A LITTLE
MOVING FROM LYING ON BACK TO SITTING ON SIDE OF FLAT BED WITH BEDRAILS: A LITTLE
PATIENT BASELINE BEDBOUND: NO

## 2024-06-13 ASSESSMENT — COLUMBIA-SUICIDE SEVERITY RATING SCALE - C-SSRS
2. HAVE YOU ACTUALLY HAD ANY THOUGHTS OF KILLING YOURSELF?: NO
1. IN THE PAST MONTH, HAVE YOU WISHED YOU WERE DEAD OR WISHED YOU COULD GO TO SLEEP AND NOT WAKE UP?: NO
6. HAVE YOU EVER DONE ANYTHING, STARTED TO DO ANYTHING, OR PREPARED TO DO ANYTHING TO END YOUR LIFE?: NO

## 2024-06-13 ASSESSMENT — PAIN SCALES - GENERAL
PAINLEVEL_OUTOF10: 0 - NO PAIN

## 2024-06-13 ASSESSMENT — PATIENT HEALTH QUESTIONNAIRE - PHQ9
2. FEELING DOWN, DEPRESSED OR HOPELESS: NOT AT ALL
SUM OF ALL RESPONSES TO PHQ9 QUESTIONS 1 & 2: 0
1. LITTLE INTEREST OR PLEASURE IN DOING THINGS: NOT AT ALL

## 2024-06-13 ASSESSMENT — PAIN - FUNCTIONAL ASSESSMENT
PAIN_FUNCTIONAL_ASSESSMENT: 0-10

## 2024-06-13 NOTE — CARE PLAN
The patient's goals for the shift include  urinate    The clinical goals for the shift include urinate

## 2024-06-13 NOTE — NURSING NOTE
Assumed care of patient. Report received from OLY Huff. Patient resting in bed. Vital signs stable, no acute distress. Patient denies any pain at this time. No stated needs. Bed is in lowest locked position with call light within reach and bed alarm is activated. Will continue to monitor.

## 2024-06-13 NOTE — CONSULTS
Reason For Consult  Pyuria, hydronephrosis, difficulty voiding    History Of Present Illness  Antonio Willis is a 78 y.o. male presenting with an inability to urinate over the past day.  He said he attempted last night multiple times to urinate and could not.  He said even for the couple of days prior to that he was having trouble urinating.  He does have a known history of BPH.  In the emergency room, however, bladder scan was done which showed only 37 cc in his bladder.  His urinalysis had 6-10 red cells and greater than 50 white cells per high-power field.  There was some bacteria.  A urine culture is pending.  Patient underwent a renal ultrasound.  This showed severe left-sided hydronephrosis.  He had some nonobstructing left renal stones.  This, however, is not a new finding.  His BUN and creatinine were 60 and 4.19.  He has a known history of chronic renal failure and this is about the level he typically is at.     Past Medical History  He has a past medical history of Cardiac pacemaker, Chronic anemia, CKD (chronic kidney disease), stage IV (Multi), Granuloma annulare, Hemiplegia, unspecified affecting right dominant side (Multi), History of GI bleed, History of prostate cancer, Hydronephrosis concurrent with and due to calculi of kidney and ureter, Hyperlipidemia, unspecified, Multiple sclerosis (Multi), Pyuria, Recurrent kidney stones, and Tremor, unspecified.    Surgical History  He has a past surgical history that includes Hernia repair (Right, 1993); US guided abscess drain (11/24/2021); IR placement of nephrostomy catheter (11/24/2021); Prostate biopsy (02/13/2008); ORIF ankle fracture (Right, 2003); Cystoscopy w/ laser lithotripsy (Left); Extracorporeal shock wave lithotripsy (Left); Bladder stone removal (11/09/2007); and pacemaker placement (10/17/2011).     Social History  He reports that he has been smoking cigars. He has been exposed to tobacco smoke. He does not have any smokeless tobacco  "history on file. He reports current alcohol use of about 1.0 standard drink of alcohol per week. He reports that he does not use drugs.    Family History  Family History   Problem Relation Name Age of Onset    Leukemia Mother      Heart attack Father      Diabetes Father      Diabetes Sister      Tremor Sister      Leukemia Brother      Parkinsonism Father's Sister          Allergies  Patient has no known allergies.    Review of Systems  As per admission H&P.     Physical Exam  Awake, alert, oriented  HEENT: Normal extraocular movements  Neck: Supple  Lungs: Normal respiratory pattern.  Back: No CVA tenderness  Abdomen: No palpable abdominal masses no tenderness.  Extremities: Moves all 4.     Last Recorded Vitals  Blood pressure 156/85, pulse 70, temperature 36.7 °C (98.1 °F), temperature source Temporal, resp. rate 20, height 1.676 m (5' 6\"), weight 79.9 kg (176 lb 1.6 oz), SpO2 96%.    Relevant Results      Scheduled medications  [START ON 6/14/2024] cefTRIAXone, 1 g, intravenous, q24h  famotidine, 20 mg, oral, Nightly  [START ON 6/14/2024] ferrous sulfate (325 mg ferrous sulfate), 65 mg of iron, oral, Daily  heparin (porcine), 5,000 Units, subcutaneous, BID  [START ON 6/14/2024] polyethylene glycol, 17 g, oral, Daily  tamsulosin, 0.4 mg, oral, Nightly      Continuous medications  sodium chloride 0.9%, 75 mL/hr, Last Rate: 75 mL/hr (06/13/24 1700)      PRN medications  PRN medications: acetaminophen **OR** acetaminophen **OR** acetaminophen, ondansetron ODT **OR** ondansetron, promethazine **OR** promethazine  Results for orders placed or performed during the hospital encounter of 06/13/24 (from the past 24 hour(s))   CBC and Auto Differential   Result Value Ref Range    WBC 9.7 4.4 - 11.3 x10*3/uL    nRBC      RBC 3.90 (L) 4.50 - 5.90 x10*6/uL    Hemoglobin 12.2 (L) 13.5 - 17.5 g/dL    Hematocrit 38.4 (L) 41.0 - 52.0 %    MCV 99 80 - 100 fL    MCH 31.3 26.0 - 34.0 pg    MCHC 31.8 (L) 32.0 - 36.0 g/dL    RDW 14.8 " (H) 11.5 - 14.5 %    Platelets 168 150 - 450 x10*3/uL    Neutrophils % 76.3 40.0 - 80.0 %    Immature Granulocytes %, Automated 0.3 0.0 - 0.9 %    Lymphocytes % 12.9 13.0 - 44.0 %    Monocytes % 7.0 2.0 - 10.0 %    Eosinophils % 3.0 0.0 - 6.0 %    Basophils % 0.5 0.0 - 2.0 %    Neutrophils Absolute 7.41 (H) 1.60 - 5.50 x10*3/uL    Immature Granulocytes Absolute, Automated 0.03 0.00 - 0.50 x10*3/uL    Lymphocytes Absolute 1.25 0.80 - 3.00 x10*3/uL    Monocytes Absolute 0.68 0.05 - 0.80 x10*3/uL    Eosinophils Absolute 0.29 0.00 - 0.40 x10*3/uL    Basophils Absolute 0.05 0.00 - 0.10 x10*3/uL   Comprehensive metabolic panel   Result Value Ref Range    Glucose 80 74 - 99 mg/dL    Sodium 144 136 - 145 mmol/L    Potassium 5.5 (H) 3.5 - 5.3 mmol/L    Chloride 117 (H) 98 - 107 mmol/L    Bicarbonate 19 (L) 21 - 32 mmol/L    Anion Gap 14 10 - 20 mmol/L    Urea Nitrogen 60 (H) 6 - 23 mg/dL    Creatinine 4.19 (H) 0.50 - 1.30 mg/dL    eGFR 14 (L) >60 mL/min/1.73m*2    Calcium 9.5 8.6 - 10.3 mg/dL    Albumin 3.7 3.4 - 5.0 g/dL    Alkaline Phosphatase 108 33 - 136 U/L    Total Protein 6.7 6.4 - 8.2 g/dL    AST 14 9 - 39 U/L    Bilirubin, Total 0.5 0.0 - 1.2 mg/dL    ALT 12 10 - 52 U/L   Potassium   Result Value Ref Range    Potassium 5.0 3.5 - 5.3 mmol/L   Urinalysis with Reflex Microscopic   Result Value Ref Range    Color, Urine Yellow Straw, Yellow    Appearance, Urine Clear Clear    Specific Gravity, Urine 1.025 1.005 - 1.035    pH, Urine 6.0 5.0, 5.5, 6.0, 6.5, 7.0, 7.5, 8.0    Protein, Urine 100 (2+) (A) NEGATIVE, TRACE mg/dL    Glucose, Urine NEGATIVE NEGATIVE mg/dL    Blood, Urine LARGE (3+) (A) NEGATIVE    Ketones, Urine NEGATIVE NEGATIVE mg/dL    Bilirubin, Urine NEGATIVE NEGATIVE    Urobilinogen, Urine 0.2 0.2, 1.0 mg/dL    Nitrite, Urine POSITIVE (A) NEGATIVE    Leukocyte Esterase, Urine MODERATE (2+) (A) NEGATIVE   Microscopic Only, Urine   Result Value Ref Range    WBC, Urine >50 (A) 1-5, NONE /HPF    WBC Clumps,  Urine FEW Reference range not established. /HPF    RBC, Urine 6-10 (A) NONE, 1-2, 3-5 /HPF   ECG 12 lead   Result Value Ref Range    Ventricular Rate 60 BPM    Atrial Rate 60 BPM    VT Interval 210 ms    QRS Duration 148 ms    QT Interval 450 ms    QTC Calculation(Bazett) 450 ms    P Axis -5 degrees    R Axis -68 degrees    T Axis -10 degrees    QRS Count 10 beats    Q Onset 204 ms    P Onset 141 ms    P Offset 161 ms    T Offset 429 ms    QTC Fredericia 450 ms        Assessment/Plan     Difficulty voiding: His bladder scan showed just a small amount in his bladder.  I do not see a need for catheter at this point.  He has had retention in the past but I do not feel that this is retention.  Perhaps he does have a UTI and the irritation that is giving him the sensation that he has to void.  At this point would hydrate him and allow him to void.  Would not place a catheter unless he has greater than 400 cc in his bladder.    Pyuria: He does have chronic pyuria.  He has had infections in the past but many times the pyuria is sterile pyuria.  He does not have an elevated white blood cell count.  I agree with starting him on ceftriaxone and awaiting the urine culture results.      Hydronephrosis:  he has chronic left hydronephrosis.  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  Given that he is not septic and has no elevation of his white blood cell count and that this kidney is markedly atrophic I do not see a need to place a stent at this point.    Chronic renal failure: He has known chronic renal failure and is followed by Dr. [place] Higgins.  His creatinine and BUN are essentially at the baseline right now.  We continue to follow.  Shorty Olmstead MD

## 2024-06-13 NOTE — NURSING NOTE
Admission vital signs obtained.  Pt. Oriented to the room.  Pt. Encouraged to order food.  Scd's on.  Call light within reach.  Bed alarm on.

## 2024-06-13 NOTE — PROGRESS NOTES
78 yr old male admitted with  severe left hydronephrosis and left renal stones, largest measuring at 17 mm.  Treatment includes IV antibiotics, IV fluids, flomax, and Urology consult.  TCC to continue to follow to determine discharge needs. Anticipates there will be no skilled needs.    06/13/24 2574   Discharge Planning   Living Arrangements Spouse/significant other   Support Systems Spouse/significant other   Assistance Needed yes   Type of Residence Private residence   Number of Stairs to Enter Residence 0   Number of Stairs Within Residence 0   Do you have animals or pets at home? No   Home or Post Acute Services None   Patient expects to be discharged to: home   Does the patient need discharge transport arranged? No   Financial Resource Strain   How hard is it for you to pay for the very basics like food, housing, medical care, and heating? Not hard   Housing Stability   In the last 12 months, was there a time when you were not able to pay the mortgage or rent on time? N   In the last 12 months, how many places have you lived? 1   In the last 12 months, was there a time when you did not have a steady place to sleep or slept in a shelter (including now)? N   Transportation Needs   In the past 12 months, has lack of transportation kept you from medical appointments or from getting medications? no   In the past 12 months, has lack of transportation kept you from meetings, work, or from getting things needed for daily living? No

## 2024-06-13 NOTE — ED NOTES
Report called to OLY Cinrton for report and updates on patient.  Patient remains stable. No distress noted.  Vitals stable.  Wife at  bedside.     Holly Delgadillo RN  06/13/24 [id number]

## 2024-06-13 NOTE — ED PROVIDER NOTES
HPI   Chief Complaint   Patient presents with   • Difficulty Urinating     Unable to urinate today,  ongoing problem for a few days.  Patient stated he was able to urinate 4 times last night.       HPI  78-year-old gentleman comes emergency room with a chief complaint of being unable to urinate for the last few days; he denied any fever chills night sweats or any other symptom  Bladder scan on arrival was 37 cc                  Kevan Coma Scale Score: 15                     Patient History   Past Medical History:   Diagnosis Date   • Acute gastric ulcer without hemorrhage or perforation 05/20/2019    Acute gastric ulcer   • Cardiac pacemaker     2011   • Hemiplegia, unspecified affecting right dominant side (Multi) 05/20/2019    Hemiparesis, right   • Hyperlipidemia, unspecified 07/20/2022    Hyperlipidemia   • Multiple sclerosis (Multi)     Dx 2011   • Other conditions influencing health status     Urinary Tract Infection   • Other conditions influencing health status     Nephrolithiasis   • Other conditions influencing health status 10/24/2013    Prostate Cancer   • Pain in right hip 05/20/2019    Greater trochanteric pain syndrome of right lower extremity   • Personal history of other diseases of the digestive system     History of constipation   • Personal history of other diseases of the musculoskeletal system and connective tissue     History of low back pain   • Personal history of other specified conditions     History of dizziness   • Personal history of other specified conditions     History of vertigo   • Personal history of urinary calculi 05/31/2018    History of renal calculi   • Tremor, unspecified     Tremor     Past Surgical History:   Procedure Laterality Date   • HERNIA REPAIR  08/20/2013    Hernia Repair   • IR  NEPHROSTOMY PLACEMENT  11/24/2021    IR  NEPHROSTOMY PLACEMENT 11/24/2021 [facility]   • OTHER SURGICAL HISTORY  08/20/2013    Closed Treatment Of Trimalleolar Ankle Fracture    • US GUIDED ABSCESS DRAIN  11/24/2021    US GUIDED ABSCESS DRAIN 11/24/2021 [facility]     Family History   Problem Relation Name Age of Onset   • Leukemia Mother     • Heart attack Father     • Diabetes Father     • Diabetes Sister     • Tremor Sister     • Leukemia Brother     • Parkinsonism Father's Sister       Social History     Tobacco Use   • Smoking status: Every Day     Types: Cigars     Passive exposure: Current   • Smokeless tobacco: Not on file   Vaping Use   • Vaping status: Never Used   Substance Use Topics   • Alcohol use: Yes     Alcohol/week: 1.0 standard drink of alcohol     Types: 1 Glasses of wine per week     Comment: 2 month   • Drug use: Never       Physical Exam   ED Triage Vitals [06/13/24 1054]   Temperature Heart Rate Respirations BP   36.5 °C (97.7 °F) 81 20 121/77      Pulse Ox Temp Source Heart Rate Source Patient Position   97 % Tympanic Monitor Sitting      BP Location FiO2 (%)     Left arm --       Physical Exam  Patient alert in no acute distress  Lungs are clear  Cardiac is regular rate and rhythm  Abdomen is soft nontender positive bowel sounds mild suprapubic tenderness otherwise benign no CVA tenderness  Extremities without cyanosis clubbing erythema or edema  ED Course & MDM   Diagnoses as of 06/15/24 1023   Urinary tract infection in elderly patient   Hydronephrosis determined by ultrasound   Hydronephrosis with renal and ureteral calculous obstruction   Hypoxemia       Medical Decision Making  Labs are essentially normal had a mild left shift on his CBC urinalysis however showed large amount of blood nitrate and leukocyte; potassium was mildly elevated repeat is normal; patient's creatinine was elevated at 4.1 he is usually about that level he is followed by Dr. Higgins  Ultrasound was obtained and shows severe hydronephrosis the left ureter  Left message with Dr. Olmstead will admit for IV antibiotics and further management    Procedure  Procedures     Miladis Faustin,  MD  06/13/24 1443       Miladis Faustin MD  06/13/24 0062

## 2024-06-13 NOTE — H&P
History and Physical    Antonio Willis is a 78 y.o. male on day 0 of admission presenting with Hydronephrosis with renal and ureteral calculous obstruction.  Room: 223    Subjective   78 year old male with pmhx SSS s/p pacer, hx prostate cancer (2008) tx w/ hormone injections and external beam radiation, chronic anemia, recurrent left-sided kidney stones and recurrent left-sided hydronephrosis with CKD stage IV.  He has had numerous left-sided laser lithotripsies along with ESWL.    He presented to [facility] emergency department with decreased urine output.  However bladder scan only revealed 37 cc. He was given 250ml NS IV. Renal U/S shows severe new left sided hydronephrosis w/ non obstructing left kidney stones. Urine positive for blood, nitrites and leukocyte Estrace.  He was given 1 g ceftriaxone.  He will be admitted for antibiotic therapy and consult with urology.    His urologist is Dr Silvnio Olmstead and nephrologist is Dr North Higgins.        PMHx:  Past Medical History:   Diagnosis Date    Cardiac pacemaker     Implanted 10/17/2011 due to SSS    Chronic anemia     CKD (chronic kidney disease), stage IV (Multi)     Hemiplegia, unspecified affecting right dominant side (Multi)     Right Hemiparesis from MS    History of GI bleed     UGI due to gastric and duodenal ulcer    History of prostate cancer     dx 02/13/2008; treated with hormone tx and external beam radiation    Hydronephrosis concurrent with and due to calculi of kidney and ureter     Recurrent episodes on the LEFT    Hyperlipidemia, unspecified     Multiple sclerosis (Multi)     Dx 2011    Pyuria     Chronic    Recurrent kidney stones     Left side    Tremor, unspecified         Past Surgical Hx:  Past Surgical History:  11/09/2007: BLADDER STONE REMOVAL      Comment:  Cystolithalopaxy  No date: CYSTOSCOPY W/ LASER LITHOTRIPSY; Left      Comment:  12/3/20, 5/6/21, 5/20/21, 8/26/21, 8/24/23  No date: EXTRACORPOREAL SHOCK WAVE  LITHOTRIPSY; Left      Comment:  10/01/20, 10/29/20  1993: HERNIA REPAIR; Right      Comment:  Right inguinal hernia repair  11/24/2021: IR  NEPHROSTOMY PLACEMENT      Comment:  IR  NEPHROSTOMY PLACEMENT 11/24/2021 JAMIN CORRAL  2003: ORIF ANKLE FRACTURE; Right  10/17/2011: PACEMAKER PLACEMENT  02/13/2008: PROSTATE BIOPSY      Comment:  + cancer  11/24/2021: US GUIDED ABSCESS DRAIN      Comment:  US GUIDED ABSCESS DRAIN 11/24/2021 JAMIN CORRAL    Social history:   reports that he has been smoking cigars. He has been exposed to tobacco smoke. He does not have any smokeless tobacco history on file. He reports current alcohol use of about 1.0 standard drink of alcohol per week. He reports that he does not use drugs.    Family history:   Family History   Problem Relation Name Age of Onset    Leukemia Mother      Heart attack Father      Diabetes Father      Diabetes Sister      Tremor Sister      Leukemia Brother      Parkinsonism Father's Sister         No Known Allergies    Home Medication:   Medication Details Provider Last Reconciliation Status   cholecalciferol (Vitamin D-3) 25 MCG (1000 UT) tablet Take 1 tablet (25 mcg) by mouth once daily. Heather Oneill PA-C Not Ordered   famotidine (Pepcid) 20 mg tablet Take 1 tablet (20 mg) by mouth once daily at bedtime. Heather Oneill PA-C Reordered   ferrous sulfate 325 (65 Fe) MG tablet Iron 325 (65 Fe) MG Oral Tablet   Refills: 0       Start : 20-Jul-2022  Active Heather Oneill PA-C Reordered   multivit-iron-minerals-folic acid (Centrum Silver) 0.4 mg-300 mcg- 250 mcg tab Take 1 tablet by mouth once daily. Heather Oneill PA-C Not Ordered   rosuvastatin (Crestor) 10 mg tablet Take 1 tablet (10 mg) by mouth once daily. Heather Oneill PA-C Not Ordered   tamsulosin (Flomax) 0.4 mg 24 hr capsule Take 1 capsule (0.4 mg) by mouth once daily at bedtime. Heather Oneill PA-C Reordered       Last Recorded Vitals  Blood pressure 156/85, pulse 70, temperature 36.7  "°C (98.1 °F), temperature source Temporal, resp. rate 20, height 1.676 m (5' 6\"), weight 79.9 kg (176 lb 1.6 oz), SpO2 96%.    Physical Exam  General: Patient in no acute distress, lying in bed  HEENT: EOMI, moist mucous membranes, anicteric  Neck: No JVD, no cervical lymphadenopathy  Heart: RRR, no murmurs, rubs, or gallops  Lungs: Clear to auscultation bilaterally, no wheezing, rhonchi, or rales  Abdomen: Soft, nontender, nondistended, no organomegaly  Extremities: No peripheral edema, +2 radial and pedal pulses  Skin: No rash or cyanosis  Neurological: AOx3, non focal  Psychiatric: Cooperative and pleasant    Recent Results:  Results for orders placed or performed during the hospital encounter of 06/13/24 (from the past 24 hour(s))   CBC and Auto Differential   Result Value Ref Range    WBC 9.7 4.4 - 11.3 x10*3/uL    nRBC      RBC 3.90 (L) 4.50 - 5.90 x10*6/uL    Hemoglobin 12.2 (L) 13.5 - 17.5 g/dL    Hematocrit 38.4 (L) 41.0 - 52.0 %    MCV 99 80 - 100 fL    MCH 31.3 26.0 - 34.0 pg    MCHC 31.8 (L) 32.0 - 36.0 g/dL    RDW 14.8 (H) 11.5 - 14.5 %    Platelets 168 150 - 450 x10*3/uL    Neutrophils % 76.3 40.0 - 80.0 %    Immature Granulocytes %, Automated 0.3 0.0 - 0.9 %    Lymphocytes % 12.9 13.0 - 44.0 %    Monocytes % 7.0 2.0 - 10.0 %    Eosinophils % 3.0 0.0 - 6.0 %    Basophils % 0.5 0.0 - 2.0 %    Neutrophils Absolute 7.41 (H) 1.60 - 5.50 x10*3/uL    Immature Granulocytes Absolute, Automated 0.03 0.00 - 0.50 x10*3/uL    Lymphocytes Absolute 1.25 0.80 - 3.00 x10*3/uL    Monocytes Absolute 0.68 0.05 - 0.80 x10*3/uL    Eosinophils Absolute 0.29 0.00 - 0.40 x10*3/uL    Basophils Absolute 0.05 0.00 - 0.10 x10*3/uL   Comprehensive metabolic panel   Result Value Ref Range    Glucose 80 74 - 99 mg/dL    Sodium 144 136 - 145 mmol/L    Potassium 5.5 (H) 3.5 - 5.3 mmol/L    Chloride 117 (H) 98 - 107 mmol/L    Bicarbonate 19 (L) 21 - 32 mmol/L    Anion Gap 14 10 - 20 mmol/L    Urea Nitrogen 60 (H) 6 - 23 mg/dL    " Creatinine 4.19 (H) 0.50 - 1.30 mg/dL    eGFR 14 (L) >60 mL/min/1.73m*2    Calcium 9.5 8.6 - 10.3 mg/dL    Albumin 3.7 3.4 - 5.0 g/dL    Alkaline Phosphatase 108 33 - 136 U/L    Total Protein 6.7 6.4 - 8.2 g/dL    AST 14 9 - 39 U/L    Bilirubin, Total 0.5 0.0 - 1.2 mg/dL    ALT 12 10 - 52 U/L   Potassium   Result Value Ref Range    Potassium 5.0 3.5 - 5.3 mmol/L   Urinalysis with Reflex Microscopic   Result Value Ref Range    Color, Urine Yellow Straw, Yellow    Appearance, Urine Clear Clear    Specific Gravity, Urine 1.025 1.005 - 1.035    pH, Urine 6.0 5.0, 5.5, 6.0, 6.5, 7.0, 7.5, 8.0    Protein, Urine 100 (2+) (A) NEGATIVE, TRACE mg/dL    Glucose, Urine NEGATIVE NEGATIVE mg/dL    Blood, Urine LARGE (3+) (A) NEGATIVE    Ketones, Urine NEGATIVE NEGATIVE mg/dL    Bilirubin, Urine NEGATIVE NEGATIVE    Urobilinogen, Urine 0.2 0.2, 1.0 mg/dL    Nitrite, Urine POSITIVE (A) NEGATIVE    Leukocyte Esterase, Urine MODERATE (2+) (A) NEGATIVE   Microscopic Only, Urine   Result Value Ref Range    WBC, Urine >50 (A) 1-5, NONE /HPF    WBC Clumps, Urine FEW Reference range not established. /HPF    RBC, Urine 6-10 (A) NONE, 1-2, 3-5 /HPF   ECG 12 lead   Result Value Ref Range    Ventricular Rate 60 BPM    Atrial Rate 60 BPM    AZ Interval 210 ms    QRS Duration 148 ms    QT Interval 450 ms    QTC Calculation(Bazett) 450 ms    P Axis -5 degrees    R Axis -68 degrees    T Axis -10 degrees    QRS Count 10 beats    Q Onset 204 ms    P Onset 141 ms    P Offset 161 ms    T Offset 429 ms    QTC Fredericia 450 ms      Estimated Creatinine Clearance: 14 mL/min (A) (by C-G formula based on SCr of 4.19 mg/dL (H)).  US renal complete   Final Result   Severe new left-sided hydronephrosis. Nonobstructing left renal   stones.        Suboptimally distended bladder with apparent bladder wall thickening   and bladder debris. Patient was unable to void.        MACRO:   None        Signed by: Chase Rivera 6/13/2024 2:57 PM   Dictation workstation:    [id number]           Assessment/Plan   1) left-sided hydronephrosis with nonobstructing kidney stones and suspected UTI   Continue ceftriaxone daily, gentle IV fluids,  consult   Dr Olmstead says he has chronic pyuria and left ureter dysfunction causing hydronephrosis. Would like gentle fluids and continue antibiotics until Ucx returns.    Strict I/Os   2) history of prostate cancer   Flomax  3) CKD stage IV   Refrain from nephrotoxic agents.  Hold statin due to CrCl 14.  Gentle fluid resuscitation 75 mL normal saline per hour continuous  4) MS with right-sided hemiparesis   Stable  5) pacemaker   Stable         I spent 65 minutes in the professional and overall care of this patient.      Heather Oneill PA-C

## 2024-06-13 NOTE — ED TRIAGE NOTES
Unable to urinate today,  ongoing problem for a few days.  Patient stated he was able to urinate 4 times last night.

## 2024-06-13 NOTE — NURSING NOTE
Assumed care of pt. Pt assisted into bed with call light in reach. Lung sounds clear bilaterally. 2+ pulses and cap refill brisk. No tenderness to abdomen or guarding. Pt states pain is 0/10 and comfortable. IV antibiotic infusing per order. No stated needs. Bed alarm activated. Wife at the bedside.

## 2024-06-14 LAB
25(OH)D3 SERPL-MCNC: 74 NG/ML (ref 31–100)
ANION GAP SERPL CALC-SCNC: 12 MMOL/L (ref 10–20)
ATRIAL RATE: 60 BPM
BASOPHILS # BLD AUTO: 0.05 X10*3/UL (ref 0–0.1)
BASOPHILS NFR BLD AUTO: 0.5 %
BUN SERPL-MCNC: 59 MG/DL (ref 6–23)
CALCIUM SERPL-MCNC: 9.1 MG/DL (ref 8.6–10.3)
CHLORIDE SERPL-SCNC: 118 MMOL/L (ref 98–107)
CO2 SERPL-SCNC: 17 MMOL/L (ref 21–32)
CREAT SERPL-MCNC: 4.12 MG/DL (ref 0.5–1.3)
EGFRCR SERPLBLD CKD-EPI 2021: 14 ML/MIN/1.73M*2
EOSINOPHIL # BLD AUTO: 0.26 X10*3/UL (ref 0–0.4)
EOSINOPHIL NFR BLD AUTO: 2.6 %
ERYTHROCYTE [DISTWIDTH] IN BLOOD BY AUTOMATED COUNT: 14.4 % (ref 11.5–14.5)
GLUCOSE SERPL-MCNC: 78 MG/DL (ref 74–99)
HCT VFR BLD AUTO: 36.8 % (ref 41–52)
HGB BLD-MCNC: 11.9 G/DL (ref 13.5–17.5)
IMM GRANULOCYTES # BLD AUTO: 0.02 X10*3/UL (ref 0–0.5)
IMM GRANULOCYTES NFR BLD AUTO: 0.2 % (ref 0–0.9)
LYMPHOCYTES # BLD AUTO: 1.27 X10*3/UL (ref 0.8–3)
LYMPHOCYTES NFR BLD AUTO: 12.8 %
MCH RBC QN AUTO: 31.6 PG (ref 26–34)
MCHC RBC AUTO-ENTMCNC: 32.3 G/DL (ref 32–36)
MCV RBC AUTO: 98 FL (ref 80–100)
MONOCYTES # BLD AUTO: 0.81 X10*3/UL (ref 0.05–0.8)
MONOCYTES NFR BLD AUTO: 8.2 %
NEUTROPHILS # BLD AUTO: 7.52 X10*3/UL (ref 1.6–5.5)
NEUTROPHILS NFR BLD AUTO: 75.7 %
NRBC BLD-RTO: ABNORMAL /100{WBCS}
P AXIS: -5 DEGREES
P OFFSET: 161 MS
P ONSET: 141 MS
PLATELET # BLD AUTO: 155 X10*3/UL (ref 150–450)
POTASSIUM SERPL-SCNC: 5.2 MMOL/L (ref 3.5–5.3)
PR INTERVAL: 210 MS
Q ONSET: 204 MS
QRS COUNT: 10 BEATS
QRS DURATION: 148 MS
QT INTERVAL: 450 MS
QTC CALCULATION(BAZETT): 450 MS
QTC FREDERICIA: 450 MS
R AXIS: -68 DEGREES
RBC # BLD AUTO: 3.76 X10*6/UL (ref 4.5–5.9)
SODIUM SERPL-SCNC: 142 MMOL/L (ref 136–145)
T AXIS: -10 DEGREES
T OFFSET: 429 MS
VENTRICULAR RATE: 60 BPM
WBC # BLD AUTO: 9.9 X10*3/UL (ref 4.4–11.3)

## 2024-06-14 PROCEDURE — 2500000004 HC RX 250 GENERAL PHARMACY W/ HCPCS (ALT 636 FOR OP/ED): Performed by: PHYSICIAN ASSISTANT

## 2024-06-14 PROCEDURE — 1100000001 HC PRIVATE ROOM DAILY

## 2024-06-14 PROCEDURE — 85025 COMPLETE CBC W/AUTO DIFF WBC: CPT | Performed by: PHYSICIAN ASSISTANT

## 2024-06-14 PROCEDURE — 2500000002 HC RX 250 W HCPCS SELF ADMINISTERED DRUGS (ALT 637 FOR MEDICARE OP, ALT 636 FOR OP/ED): Performed by: PHYSICIAN ASSISTANT

## 2024-06-14 PROCEDURE — 80048 BASIC METABOLIC PNL TOTAL CA: CPT | Performed by: PHYSICIAN ASSISTANT

## 2024-06-14 PROCEDURE — 36415 COLL VENOUS BLD VENIPUNCTURE: CPT | Performed by: PHYSICIAN ASSISTANT

## 2024-06-14 PROCEDURE — 2500000001 HC RX 250 WO HCPCS SELF ADMINISTERED DRUGS (ALT 637 FOR MEDICARE OP): Performed by: PHYSICIAN ASSISTANT

## 2024-06-14 PROCEDURE — 2500000005 HC RX 250 GENERAL PHARMACY W/O HCPCS: Performed by: STUDENT IN AN ORGANIZED HEALTH CARE EDUCATION/TRAINING PROGRAM

## 2024-06-14 PROCEDURE — 2500000001 HC RX 250 WO HCPCS SELF ADMINISTERED DRUGS (ALT 637 FOR MEDICARE OP): Performed by: PHARMACIST

## 2024-06-14 ASSESSMENT — COGNITIVE AND FUNCTIONAL STATUS - GENERAL
MOVING TO AND FROM BED TO CHAIR: A LITTLE
TOILETING: A LITTLE
TURNING FROM BACK TO SIDE WHILE IN FLAT BAD: A LITTLE
MOVING TO AND FROM BED TO CHAIR: A LITTLE
TURNING FROM BACK TO SIDE WHILE IN FLAT BAD: A LITTLE
MOBILITY SCORE: 18
STANDING UP FROM CHAIR USING ARMS: A LITTLE
DRESSING REGULAR LOWER BODY CLOTHING: A LITTLE
CLIMB 3 TO 5 STEPS WITH RAILING: A LITTLE
MOVING FROM LYING ON BACK TO SITTING ON SIDE OF FLAT BED WITH BEDRAILS: A LITTLE
CLIMB 3 TO 5 STEPS WITH RAILING: A LITTLE
MOVING FROM LYING ON BACK TO SITTING ON SIDE OF FLAT BED WITH BEDRAILS: A LITTLE
WALKING IN HOSPITAL ROOM: A LITTLE
WALKING IN HOSPITAL ROOM: A LITTLE
DRESSING REGULAR LOWER BODY CLOTHING: A LITTLE
STANDING UP FROM CHAIR USING ARMS: A LITTLE
DAILY ACTIVITIY SCORE: 22
DAILY ACTIVITIY SCORE: 22
MOBILITY SCORE: 18
TOILETING: A LITTLE

## 2024-06-14 ASSESSMENT — PAIN SCALES - GENERAL
PAINLEVEL_OUTOF10: 0 - NO PAIN

## 2024-06-14 ASSESSMENT — PAIN - FUNCTIONAL ASSESSMENT
PAIN_FUNCTIONAL_ASSESSMENT: FLACC (FACE, LEGS, ACTIVITY, CRY, CONSOLABILITY)
PAIN_FUNCTIONAL_ASSESSMENT: 0-10
PAIN_FUNCTIONAL_ASSESSMENT: 0-10
PAIN_FUNCTIONAL_ASSESSMENT: FLACC (FACE, LEGS, ACTIVITY, CRY, CONSOLABILITY)

## 2024-06-14 NOTE — NURSING NOTE
Assumed care of patient. Report received from OLY Andrade. Vital signs stable, no acute distress. Patient resting in bed watching TV. Patient denies any pain at this time. Patient voiding without difficulty and continues to have incontinence. No stated needs. Bed is in lowest locked position with call light within reach and bed alarm activated. Will continue to monitor.

## 2024-06-14 NOTE — CARE PLAN
The patient's goals for the shift include  improved urination.    The clinical goals for the shift include be able to urinate.

## 2024-06-14 NOTE — CARE PLAN
The patient's goals for the shift include  improved urinary output.     The clinical goals for the shift include Improved urinary output.

## 2024-06-14 NOTE — NURSING NOTE
Admitted to room 222 from surgery following left knee surgery. Oriented to room. Call light given.Denies pain at present. Dressing intact with no noted drainage.Ice man maintained.Family at bedside.

## 2024-06-14 NOTE — NURSING NOTE
Patient resting in bed with eyes closed. Respirations even and unlabored. Call light is within reach and bed alarm is activated.

## 2024-06-14 NOTE — PROGRESS NOTES
Patient seen, chart reviewed.  States that he has been having increased urine output since admission, but also complains of incontinence, which is not new.  No acute complaints.  Vital signs are stable.  Hopeful for discharge tomorrow.         Ivania Griffiths MD

## 2024-06-14 NOTE — PROGRESS NOTES
"  Progress Note:    Antonio Willis is a 78 y.o. male on day 1 of admission presenting with Hydronephrosis with renal and ureteral calculous obstruction.    Subjective   During interdisciplinary rounds patient expresses that he feels better and has been able to urinate.  Has not requested any bladder scans. Recorded output 500ml which is in range of normal since his admission. Although his net IO is 1,120ml so he must have been dry regardless of his admission of drinking a lot of water.  I have reviewed all vitals, labs, and notes. He will continue to be monitored today and wait on urine culture results. Creatinine is within his baseline.     Net IO Since Admission: 1,120 mL [06/14/24 1052]       Physical Exam  General: No acute distress, alert & oriented    Cardiac: RRR, NL S1 and S2, no murmurs, rubs or gallops    Pulmonary: Lungs clear to auscultation bilaterally, no wheezes, rhales or rhonchi    Abdomen: Soft, non-tender, non-distended    Extremities: No clubbing , cyanosis or edema.     Last Recorded Vitals  Blood pressure 134/70, pulse (!) 16, temperature 36.1 °C (97 °F), temperature source Temporal, resp. rate 16, height 1.676 m (5' 6\"), weight 79.9 kg (176 lb 1.6 oz), SpO2 91%.    Scheduled medications   Medication Dose Route Frequency    cefTRIAXone  1 g intravenous q24h    famotidine  20 mg oral Daily    ferrous sulfate (325 mg ferrous sulfate)  65 mg of iron oral Daily    heparin (porcine)  5,000 Units subcutaneous BID    polyethylene glycol  17 g oral Daily    tamsulosin  0.4 mg oral Nightly     Relevant Results  Results from last 7 days   Lab Units 06/14/24  0430 06/13/24  1206   WBC AUTO x10*3/uL 9.9 9.7   HEMOGLOBIN g/dL 11.9* 12.2*   HEMATOCRIT % 36.8* 38.4*   PLATELETS AUTO x10*3/uL 155 168      Results from last 7 days   Lab Units 06/14/24  0430 06/13/24  1318 06/13/24  1206   SODIUM mmol/L 142  --  144   POTASSIUM mmol/L 5.2 5.0 5.5*   CHLORIDE mmol/L 118*  --  117*   CO2 mmol/L 17*  --  19*   BUN " mg/dL 59*  --  60*   CREATININE mg/dL 4.12*  --  4.19*   GLUCOSE mg/dL 78  --  80   CALCIUM mg/dL 9.1  --  9.5      Estimated Creatinine Clearance: 14.2 mL/min (A) (by C-G formula based on SCr of 4.12 mg/dL (H)).    Assessment/Plan   1) left-sided hydronephrosis with nonobstructing kidney stones and suspected UTI              Continue ceftriaxone daily, gentle IV fluids    Dr Olmstead says he has chronic pyuria and left ureter dysfunction causing hydronephrosis. Would like gentle fluids and continue antibiotics until Ucx returns.               Strict I/Os   2) history of prostate cancer              Flomax  3) CKD stage IV              Refrain from nephrotoxic agents.  Hold statin due to CrCl 14.  Gentle fluid resuscitation 75 mL normal saline per hour continuous  4) MS with right-sided hemiparesis              Stable  5) pacemaker              Stable  Disposition-wait on urine culture.        I spent 25 minutes in the professional and overall care of this patient.      Heather Oneill PA-C

## 2024-06-14 NOTE — NURSING NOTE
IV restarted in right forearm #20 due to previous occluded IV site.Voiding large amounts of urine. Remains incontinent.

## 2024-06-14 NOTE — CARE PLAN
The patient's goals for the shift include  be able to urinate    The clinical goals for the shift include urinate

## 2024-06-14 NOTE — NURSING NOTE
Patient very upset about being in the hospital.Wants to go home. Explained to patient that we need to obtain urine culture results prior to being discharged.Incontinent of urine.

## 2024-06-14 NOTE — PROGRESS NOTES
"Antonio Willis is a 78 y.o. male on day 1 of admission presenting with Hydronephrosis with renal and ureteral calculous obstruction.    Subjective   Patient says he feels normal today.  He has voided.  He voids in the 100 cc increments every 1-2 hours.  Yesterday he had a very small postvoid residual.  White blood cell count is normal.  It is currently 9.9.  There is a slight left shift.  Renal function today shows a BUN of 59 and a creatinine of 4.12.  This is at his baseline.  Urine culture is still pending.       Objective     Physical Exam  Awake, alert, oriented  Lungs: Normal respiratory pattern  Back: No CVA tenderness  Abdomen: Soft and nontender  Psychological: Normal affect  Last Recorded Vitals  Blood pressure 122/62, pulse 66, temperature 36.1 °C (97 °F), temperature source Temporal, resp. rate 16, height 1.676 m (5' 6\"), weight 79.9 kg (176 lb 1.6 oz), SpO2 98%.  Intake/Output last 3 Shifts:  I/O last 3 completed shifts:  In: 1430 (17.9 mL/kg) [P.O.:180; I.V.:1000 (12.5 mL/kg); IV Piggyback:250]  Out: 550 (6.9 mL/kg) [Urine:550 (0.2 mL/kg/hr)]  Weight: 79.9 kg     Relevant Results  Scheduled medications  cefTRIAXone, 1 g, intravenous, q24h  famotidine, 20 mg, oral, Daily  ferrous sulfate (325 mg ferrous sulfate), 65 mg of iron, oral, Daily  heparin (porcine), 5,000 Units, subcutaneous, BID  polyethylene glycol, 17 g, oral, Daily  tamsulosin, 0.4 mg, oral, Nightly      Continuous medications  sodium chloride 0.9%, 75 mL/hr, Last Rate: 75 mL/hr (06/14/24 0620)      PRN medications  PRN medications: acetaminophen **OR** acetaminophen **OR** acetaminophen, benzocaine-menthol, lubricating eye drops, melatonin, ondansetron ODT **OR** ondansetron, promethazine **OR** promethazine, simethicone, sodium chloride    Results for orders placed or performed during the hospital encounter of 06/13/24 (from the past 24 hour(s))   CBC and Auto Differential   Result Value Ref Range    WBC 9.9 4.4 - 11.3 x10*3/uL    nRBC  "     RBC 3.76 (L) 4.50 - 5.90 x10*6/uL    Hemoglobin 11.9 (L) 13.5 - 17.5 g/dL    Hematocrit 36.8 (L) 41.0 - 52.0 %    MCV 98 80 - 100 fL    MCH 31.6 26.0 - 34.0 pg    MCHC 32.3 32.0 - 36.0 g/dL    RDW 14.4 11.5 - 14.5 %    Platelets 155 150 - 450 x10*3/uL    Neutrophils % 75.7 40.0 - 80.0 %    Immature Granulocytes %, Automated 0.2 0.0 - 0.9 %    Lymphocytes % 12.8 13.0 - 44.0 %    Monocytes % 8.2 2.0 - 10.0 %    Eosinophils % 2.6 0.0 - 6.0 %    Basophils % 0.5 0.0 - 2.0 %    Neutrophils Absolute 7.52 (H) 1.60 - 5.50 x10*3/uL    Immature Granulocytes Absolute, Automated 0.02 0.00 - 0.50 x10*3/uL    Lymphocytes Absolute 1.27 0.80 - 3.00 x10*3/uL    Monocytes Absolute 0.81 (H) 0.05 - 0.80 x10*3/uL    Eosinophils Absolute 0.26 0.00 - 0.40 x10*3/uL    Basophils Absolute 0.05 0.00 - 0.10 x10*3/uL   Basic Metabolic Panel   Result Value Ref Range    Glucose 78 74 - 99 mg/dL    Sodium 142 136 - 145 mmol/L    Potassium 5.2 3.5 - 5.3 mmol/L    Chloride 118 (H) 98 - 107 mmol/L    Bicarbonate 17 (L) 21 - 32 mmol/L    Anion Gap 12 10 - 20 mmol/L    Urea Nitrogen 59 (H) 6 - 23 mg/dL    Creatinine 4.12 (H) 0.50 - 1.30 mg/dL    eGFR 14 (L) >60 mL/min/1.73m*2    Calcium 9.1 8.6 - 10.3 mg/dL       ECG 12 lead    Result Date: 6/14/2024  AV sequential or dual chamber electronic pacemaker Abnormal ECG When compared with ECG of 20-JUL-2022 13:18, Ventricular pacing now present in addition to atrial pacing Inverted T waves have replaced nonspecific T wave abnormality in Inferior leads Confirmed by Jagjit Do ([id number]) on 6/14/2024 1:20:30 PM    US renal complete    Result Date: 6/13/2024  Interpreted By:  Chase Rivera, STUDY: US RENAL COMPLETE;  6/13/2024 2:35 pm   INDICATION: Signs/Symptoms:decreased urinary output;history stent.   COMPARISON: 10/02/2023   ACCESSION NUMBER(S): [id number]   ORDERING CLINICIAN: ANGEL OCAMPO   TECHNIQUE: Multiple images of the kidneys were obtained  .   FINDINGS: RIGHT KIDNEY: The right kidney measures  11.1 cm in length. The renal cortical echogenicity is increased. No hydronephrosis.   LEFT KIDNEY: The left kidney measures 14.7 cm in length. There is severe left-sided hydronephrosis. This is new since the prior examination of 10/02/2023. Multiple left renal stones with the largest measuring 17 mm..   BLADDER: Suboptimally distended. Debris within the posterior bladder. Apparent bladder wall thickening. Patient was unable to void.       Severe new left-sided hydronephrosis. Nonobstructing left renal stones.   Suboptimally distended bladder with apparent bladder wall thickening and bladder debris. Patient was unable to void.   MACRO: None   Signed by: Chase Rivera 6/13/2024 2:57 PM Dictation workstation:   [id number]                             Assessment/Plan   Principal Problem:    Hydronephrosis with renal and ureteral calculous obstruction  Active Problems:    Urinary tract infection in elderly patient    Clinically stable from the urologic standpoint.  Renal function is at his baseline.  The hydronephrosis is chronic.  The pyuria is also chronic.  From the urologic standpoint he is cleared to go home.  Will leave final decision to the hospitalist service.             Shorty Olmstead MD

## 2024-06-14 NOTE — NURSING NOTE
Patient resting in bed. Morning labs drawn and sent to the lab. Vital signs stable. Patient smokes two cigars per day and his pulse ox was 91% on room air. RT was notified and patient placed on 2L of oxygen per nasal cannula while he sleeps. Patient denies pain at this time. No stated needs. Call light is within reach and bed alarm is activated.

## 2024-06-15 VITALS
OXYGEN SATURATION: 97 % | DIASTOLIC BLOOD PRESSURE: 58 MMHG | HEART RATE: 64 BPM | WEIGHT: 176.1 LBS | SYSTOLIC BLOOD PRESSURE: 122 MMHG | BODY MASS INDEX: 28.3 KG/M2 | HEIGHT: 66 IN | RESPIRATION RATE: 18 BRPM | TEMPERATURE: 97.6 F

## 2024-06-15 PROBLEM — N13.2 HYDRONEPHROSIS WITH RENAL AND URETERAL CALCULOUS OBSTRUCTION: Status: RESOLVED | Noted: 2024-06-13 | Resolved: 2024-06-15

## 2024-06-15 LAB
ANION GAP SERPL CALC-SCNC: 14 MMOL/L (ref 10–20)
BASOPHILS # BLD AUTO: 0.04 X10*3/UL (ref 0–0.1)
BASOPHILS NFR BLD AUTO: 0.5 %
BUN SERPL-MCNC: 58 MG/DL (ref 6–23)
CALCIUM SERPL-MCNC: 8.6 MG/DL (ref 8.6–10.3)
CHLORIDE SERPL-SCNC: 121 MMOL/L (ref 98–107)
CO2 SERPL-SCNC: 16 MMOL/L (ref 21–32)
CREAT SERPL-MCNC: 4.16 MG/DL (ref 0.5–1.3)
EGFRCR SERPLBLD CKD-EPI 2021: 14 ML/MIN/1.73M*2
EOSINOPHIL # BLD AUTO: 0.32 X10*3/UL (ref 0–0.4)
EOSINOPHIL NFR BLD AUTO: 4 %
ERYTHROCYTE [DISTWIDTH] IN BLOOD BY AUTOMATED COUNT: 14.6 % (ref 11.5–14.5)
GLUCOSE SERPL-MCNC: 92 MG/DL (ref 74–99)
HCT VFR BLD AUTO: 35.3 % (ref 41–52)
HGB BLD-MCNC: 11.2 G/DL (ref 13.5–17.5)
IMM GRANULOCYTES # BLD AUTO: 0.04 X10*3/UL (ref 0–0.5)
IMM GRANULOCYTES NFR BLD AUTO: 0.5 % (ref 0–0.9)
LYMPHOCYTES # BLD AUTO: 1.42 X10*3/UL (ref 0.8–3)
LYMPHOCYTES NFR BLD AUTO: 18 %
MCH RBC QN AUTO: 31.4 PG (ref 26–34)
MCHC RBC AUTO-ENTMCNC: 31.7 G/DL (ref 32–36)
MCV RBC AUTO: 99 FL (ref 80–100)
MONOCYTES # BLD AUTO: 0.76 X10*3/UL (ref 0.05–0.8)
MONOCYTES NFR BLD AUTO: 9.6 %
NEUTROPHILS # BLD AUTO: 5.33 X10*3/UL (ref 1.6–5.5)
NEUTROPHILS NFR BLD AUTO: 67.4 %
NRBC BLD-RTO: ABNORMAL /100{WBCS}
PLATELET # BLD AUTO: 154 X10*3/UL (ref 150–450)
POTASSIUM SERPL-SCNC: 5 MMOL/L (ref 3.5–5.3)
RBC # BLD AUTO: 3.57 X10*6/UL (ref 4.5–5.9)
SODIUM SERPL-SCNC: 146 MMOL/L (ref 136–145)
WBC # BLD AUTO: 7.9 X10*3/UL (ref 4.4–11.3)

## 2024-06-15 PROCEDURE — 2500000004 HC RX 250 GENERAL PHARMACY W/ HCPCS (ALT 636 FOR OP/ED): Performed by: PHYSICIAN ASSISTANT

## 2024-06-15 PROCEDURE — 2500000001 HC RX 250 WO HCPCS SELF ADMINISTERED DRUGS (ALT 637 FOR MEDICARE OP): Performed by: PHYSICIAN ASSISTANT

## 2024-06-15 PROCEDURE — 80048 BASIC METABOLIC PNL TOTAL CA: CPT | Performed by: PHYSICIAN ASSISTANT

## 2024-06-15 PROCEDURE — 2500000001 HC RX 250 WO HCPCS SELF ADMINISTERED DRUGS (ALT 637 FOR MEDICARE OP): Performed by: PHARMACIST

## 2024-06-15 PROCEDURE — 85025 COMPLETE CBC W/AUTO DIFF WBC: CPT | Performed by: PHYSICIAN ASSISTANT

## 2024-06-15 PROCEDURE — 36415 COLL VENOUS BLD VENIPUNCTURE: CPT | Performed by: PHYSICIAN ASSISTANT

## 2024-06-15 RX ORDER — AMOXICILLIN AND CLAVULANATE POTASSIUM 500; 125 MG/1; MG/1
1 TABLET, FILM COATED ORAL 2 TIMES DAILY
Qty: 14 TABLET | Refills: 0 | Status: SHIPPED | OUTPATIENT
Start: 2024-06-15 | End: 2024-06-22

## 2024-06-15 ASSESSMENT — COGNITIVE AND FUNCTIONAL STATUS - GENERAL
MOVING FROM LYING ON BACK TO SITTING ON SIDE OF FLAT BED WITH BEDRAILS: A LITTLE
TOILETING: A LITTLE
DAILY ACTIVITIY SCORE: 22
MOBILITY SCORE: 18
STANDING UP FROM CHAIR USING ARMS: A LITTLE
DRESSING REGULAR LOWER BODY CLOTHING: A LITTLE
CLIMB 3 TO 5 STEPS WITH RAILING: A LITTLE
TURNING FROM BACK TO SIDE WHILE IN FLAT BAD: A LITTLE
WALKING IN HOSPITAL ROOM: A LITTLE
MOVING TO AND FROM BED TO CHAIR: A LITTLE

## 2024-06-15 ASSESSMENT — PAIN SCALES - GENERAL
PAINLEVEL_OUTOF10: 0 - NO PAIN
PAINLEVEL_OUTOF10: 0 - NO PAIN

## 2024-06-15 ASSESSMENT — PAIN - FUNCTIONAL ASSESSMENT
PAIN_FUNCTIONAL_ASSESSMENT: 0-10
PAIN_FUNCTIONAL_ASSESSMENT: 0-10

## 2024-06-15 ASSESSMENT — ACTIVITIES OF DAILY LIVING (ADL): EFFECT OF PAIN ON DAILY ACTIVITIES: UNABLE TO URINATE

## 2024-06-15 NOTE — NURSING NOTE
06/15/24 11:24  NURSING DISCHARGE PLANNING NOTE  Patient discharged to home.  IV removed; catheter intact.  RN reviewed discharge paperwork with patient.  Patient indicated understanding and had no questions.  Pt received a copy of the paperwork.  All belongings in pt possession at the time of discharge.  Abeba Guzman RN

## 2024-06-15 NOTE — PROGRESS NOTES
Patient sleeping at the time of rounds, chart reviewed.  Vital signs are stable.  Nursing notes no issues.              Ivania Griffiths MD

## 2024-06-15 NOTE — NURSING NOTE
Received report from night shift nurse Leonora. Went in to introduce myself to the pt. Pt was in bed watching tv and breakfast had just arrived. Pt stated that he needed his cane asked him why he said to get up and get ready because he is going home today he does not care. Let pt know that after breakfast we can get him dressed and prepare him for that. Also, let him know that I would be back in to pass meds and to do my assessment. Call light within reach, bed locked and lowered and bed alarm on.

## 2024-06-15 NOTE — DISCHARGE SUMMARY
Discharge Diagnosis  Hydronephrosis with renal and ureteral calculous obstruction    Issues Requiring Follow-Up  Follow-up appointment with Dr. Olmstead    Discharge Meds     Your medication list        START taking these medications        Instructions Last Dose Given Next Dose Due   amoxicillin-pot clavulanate 500-125 mg tablet  Commonly known as: Augmentin      Take 1 tablet by mouth 2 times a day for 7 days.       oxygen gas therapy  Commonly known as: O2      Inhale 2 L/min continuously.              CHANGE how you take these medications        Instructions Last Dose Given Next Dose Due   rosuvastatin 10 mg tablet  Commonly known as: Crestor  What changed: Another medication with the same name was removed. Continue taking this medication, and follow the directions you see here.                  CONTINUE taking these medications        Instructions Last Dose Given Next Dose Due   Centrum Silver 0.4 mg-300 mcg- 250 mcg  Generic drug: multivitamin with minerals iron-free           cholecalciferol 25 MCG (1000 UT) tablet  Commonly known as: Vitamin D-3           famotidine 20 mg tablet  Commonly known as: Pepcid           ferrous sulfate (325 mg ferrous sulfate) tablet           tamsulosin 0.4 mg 24 hr capsule  Commonly known as: Flomax                     Where to Get Your Medications        These medications were sent to [id number] - [place], [place] - [address] [address] [facility]  [id number] [address] [facility] [address]      Phone: [phone]   amoxicillin-pot clavulanate 500-125 mg tablet       Information about where to get these medications is not yet available    Ask your nurse or doctor about these medications  oxygen gas therapy         Test Results Pending At Discharge  Pending Labs       Order Current Status    PSA, total and free In process    Urine culture In process            Hospital Course   78-year-old severe left hydronephrosis and infected urine; admitted to the [facility] service receiving IV antibiotics  patient has done well while in the hospital; Dr. Olmstead feels that the hydronephrosis is baseline for him and no surgical intervention was made; urine culture still pending but patient insist on leaving will go home with Augmentin and follow-up with Dr. Olmstead    Pertinent Physical Exam At Time of Discharge  Physical Exam  Patient alert in no acute distress  Lungs with occasional rhonchi  Cardiac is regular rate and rhythm  Abdomen is soft nontender positive bowel sounds there is no hepatosplenomegaly or CVA tenderness appreciate  Extremities without cyanosis clubbing erythema or edema  Outpatient Follow-Up  Future Appointments   Date Time Provider Department Center   7/17/2024  9:40 AM Driss Miller MD [id number] [facility]   11/12/2024 10:30 AM Lincoln Lopez MD [id number] [facility]         Miladis Faustin MD

## 2024-06-16 LAB — BACTERIA UR CULT: ABNORMAL

## 2024-06-19 LAB
PSA FREE MFR SERPL: 33 %
PSA FREE SERPL-MCNC: 0.1 NG/ML
PSA SERPL IA-MCNC: 0.3 NG/ML (ref 0–4)

## 2024-06-27 ENCOUNTER — HOSPITAL ENCOUNTER (OUTPATIENT)
Dept: CARDIOLOGY | Facility: CLINIC | Age: 79
Discharge: HOME | End: 2024-06-27
Payer: MEDICARE

## 2024-06-27 DIAGNOSIS — Z95.0 PRESENCE OF CARDIAC PACEMAKER: ICD-10-CM

## 2024-06-27 DIAGNOSIS — I49.5 SICK SINUS SYNDROME (MULTI): ICD-10-CM

## 2024-06-27 PROCEDURE — 93294 REM INTERROG EVL PM/LDLS PM: CPT | Performed by: INTERNAL MEDICINE

## 2024-06-27 PROCEDURE — 93296 REM INTERROG EVL PM/IDS: CPT

## 2024-06-28 ENCOUNTER — LAB (OUTPATIENT)
Dept: LAB | Facility: LAB | Age: 79
End: 2024-06-28
Payer: MEDICARE

## 2024-06-28 DIAGNOSIS — R30.0 DYSURIA: Primary | ICD-10-CM

## 2024-06-28 PROCEDURE — 87086 URINE CULTURE/COLONY COUNT: CPT

## 2024-06-30 LAB — BACTERIA UR CULT: NORMAL

## 2024-07-17 ENCOUNTER — APPOINTMENT (OUTPATIENT)
Dept: CARDIOLOGY | Facility: HOSPITAL | Age: 79
End: 2024-07-17
Payer: MEDICARE

## 2024-07-23 ENCOUNTER — HOSPITAL ENCOUNTER (OUTPATIENT)
Dept: CARDIOLOGY | Facility: CLINIC | Age: 79
Discharge: HOME | End: 2024-07-23
Payer: MEDICARE

## 2024-07-23 DIAGNOSIS — I49.5 SICK SINUS SYNDROME (MULTI): ICD-10-CM

## 2024-07-23 DIAGNOSIS — Z95.0 PRESENCE OF CARDIAC PACEMAKER: ICD-10-CM

## 2024-08-07 DIAGNOSIS — E78.5 HYPERLIPIDEMIA, UNSPECIFIED HYPERLIPIDEMIA TYPE: Primary | ICD-10-CM

## 2024-08-07 RX ORDER — ROSUVASTATIN CALCIUM 10 MG/1
10 TABLET, COATED ORAL NIGHTLY
Qty: 90 TABLET | Refills: 3 | Status: SHIPPED | OUTPATIENT
Start: 2024-08-07 | End: 2025-08-07

## 2024-08-08 ENCOUNTER — APPOINTMENT (OUTPATIENT)
Dept: UROLOGY | Facility: HOSPITAL | Age: 79
End: 2024-08-08
Payer: MEDICARE

## 2024-08-14 DIAGNOSIS — E78.5 HYPERLIPIDEMIA, UNSPECIFIED HYPERLIPIDEMIA TYPE: Primary | ICD-10-CM

## 2024-08-14 RX ORDER — ROSUVASTATIN CALCIUM 10 MG/1
10 TABLET, COATED ORAL NIGHTLY
Qty: 90 TABLET | Refills: 3 | Status: SHIPPED | OUTPATIENT
Start: 2024-08-14 | End: 2025-08-14

## 2024-08-20 ENCOUNTER — APPOINTMENT (OUTPATIENT)
Dept: CARDIOLOGY | Facility: HOSPITAL | Age: 79
End: 2024-08-20
Payer: MEDICARE

## 2024-08-25 ENCOUNTER — APPOINTMENT (OUTPATIENT)
Dept: RADIOLOGY | Facility: HOSPITAL | Age: 79
End: 2024-08-25
Payer: MEDICARE

## 2024-08-25 ENCOUNTER — HOSPITAL ENCOUNTER (INPATIENT)
Facility: HOSPITAL | Age: 79
LOS: 3 days | Discharge: NURSING, REHAB OR LONG-TERM CARE | End: 2024-08-28
Attending: INTERNAL MEDICINE | Admitting: INTERNAL MEDICINE
Payer: MEDICARE

## 2024-08-25 DIAGNOSIS — U07.1 COVID-19: ICD-10-CM

## 2024-08-25 DIAGNOSIS — E87.20 METABOLIC ACIDOSIS: ICD-10-CM

## 2024-08-25 DIAGNOSIS — J18.9 PNEUMONIA OF RIGHT LUNG DUE TO INFECTIOUS ORGANISM, UNSPECIFIED PART OF LUNG: ICD-10-CM

## 2024-08-25 DIAGNOSIS — R09.02 OXYGEN DEFICIENCY: Primary | ICD-10-CM

## 2024-08-25 LAB
ALBUMIN SERPL BCP-MCNC: 3.8 G/DL (ref 3.4–5)
ALP SERPL-CCNC: 106 U/L (ref 33–136)
ALT SERPL W P-5'-P-CCNC: 19 U/L (ref 10–52)
ANION GAP BLDV CALCULATED.4IONS-SCNC: 15 MMOL/L (ref 10–25)
ANION GAP SERPL CALC-SCNC: 18 MMOL/L (ref 10–20)
AST SERPL W P-5'-P-CCNC: 30 U/L (ref 9–39)
BASE EXCESS BLDV CALC-SCNC: -7.7 MMOL/L (ref -2–3)
BASOPHILS # BLD AUTO: 0.02 X10*3/UL (ref 0–0.1)
BASOPHILS NFR BLD AUTO: 0.3 %
BILIRUB SERPL-MCNC: 0.5 MG/DL (ref 0–1.2)
BNP SERPL-MCNC: 60 PG/ML (ref 0–99)
BODY TEMPERATURE: 37 DEGREES CELSIUS
BUN SERPL-MCNC: 69 MG/DL (ref 6–23)
CA-I BLDV-SCNC: 1.3 MMOL/L (ref 1.1–1.33)
CALCIUM SERPL-MCNC: 9.9 MG/DL (ref 8.6–10.3)
CARDIAC TROPONIN I PNL SERPL HS: 21 NG/L (ref 0–20)
CHLORIDE BLDV-SCNC: 115 MMOL/L (ref 98–107)
CHLORIDE SERPL-SCNC: 115 MMOL/L (ref 98–107)
CO2 SERPL-SCNC: 18 MMOL/L (ref 21–32)
CREAT SERPL-MCNC: 4.68 MG/DL (ref 0.5–1.3)
EGFRCR SERPLBLD CKD-EPI 2021: 12 ML/MIN/1.73M*2
EOSINOPHIL # BLD AUTO: 0 X10*3/UL (ref 0–0.4)
EOSINOPHIL NFR BLD AUTO: 0 %
ERYTHROCYTE [DISTWIDTH] IN BLOOD BY AUTOMATED COUNT: 14.2 % (ref 11.5–14.5)
FLUAV RNA RESP QL NAA+PROBE: NOT DETECTED
FLUBV RNA RESP QL NAA+PROBE: NOT DETECTED
GLUCOSE BLD MANUAL STRIP-MCNC: 129 MG/DL (ref 74–99)
GLUCOSE BLDV-MCNC: 90 MG/DL (ref 74–99)
GLUCOSE SERPL-MCNC: 73 MG/DL (ref 74–99)
HCO3 BLDV-SCNC: 18.8 MMOL/L (ref 22–26)
HCT VFR BLD AUTO: 44.9 % (ref 41–52)
HCT VFR BLD EST: 42 % (ref 41–52)
HGB BLD-MCNC: 14.3 G/DL (ref 13.5–17.5)
HGB BLDV-MCNC: 13.9 G/DL (ref 13.5–17.5)
IMM GRANULOCYTES # BLD AUTO: 0.05 X10*3/UL (ref 0–0.5)
IMM GRANULOCYTES NFR BLD AUTO: 0.7 % (ref 0–0.9)
INHALED O2 CONCENTRATION: 32 %
LACTATE BLDV-SCNC: 1.6 MMOL/L (ref 0.4–2)
LYMPHOCYTES # BLD AUTO: 0.88 X10*3/UL (ref 0.8–3)
LYMPHOCYTES NFR BLD AUTO: 12.3 %
MAGNESIUM SERPL-MCNC: 2.4 MG/DL (ref 1.6–2.4)
MCH RBC QN AUTO: 31.8 PG (ref 26–34)
MCHC RBC AUTO-ENTMCNC: 31.8 G/DL (ref 32–36)
MCV RBC AUTO: 100 FL (ref 80–100)
MONOCYTES # BLD AUTO: 0.81 X10*3/UL (ref 0.05–0.8)
MONOCYTES NFR BLD AUTO: 11.4 %
NEUTROPHILS # BLD AUTO: 5.37 X10*3/UL (ref 1.6–5.5)
NEUTROPHILS NFR BLD AUTO: 75.3 %
NRBC BLD-RTO: 0 /100 WBCS (ref 0–0)
OXYHGB MFR BLDV: 27.5 % (ref 45–75)
PCO2 BLDV: 41 MM HG (ref 41–51)
PH BLDV: 7.27 PH (ref 7.33–7.43)
PLATELET # BLD AUTO: 177 X10*3/UL (ref 150–450)
PO2 BLDV: 23 MM HG (ref 35–45)
POTASSIUM BLDV-SCNC: 4.8 MMOL/L (ref 3.5–5.3)
POTASSIUM SERPL-SCNC: 4.6 MMOL/L (ref 3.5–5.3)
PROT SERPL-MCNC: 7.6 G/DL (ref 6.4–8.2)
RBC # BLD AUTO: 4.49 X10*6/UL (ref 4.5–5.9)
SAO2 % BLDV: 28 % (ref 45–75)
SARS-COV-2 RNA RESP QL NAA+PROBE: DETECTED
SODIUM BLDV-SCNC: 144 MMOL/L (ref 136–145)
SODIUM SERPL-SCNC: 146 MMOL/L (ref 136–145)
WBC # BLD AUTO: 7.1 X10*3/UL (ref 4.4–11.3)

## 2024-08-25 PROCEDURE — 71046 X-RAY EXAM CHEST 2 VIEWS: CPT

## 2024-08-25 PROCEDURE — 82947 ASSAY GLUCOSE BLOOD QUANT: CPT

## 2024-08-25 PROCEDURE — 3E0DX3Z INTRODUCTION OF ANTI-INFLAMMATORY INTO MOUTH AND PHARYNX, EXTERNAL APPROACH: ICD-10-PCS | Performed by: INTERNAL MEDICINE

## 2024-08-25 PROCEDURE — 2500000005 HC RX 250 GENERAL PHARMACY W/O HCPCS: Performed by: INTERNAL MEDICINE

## 2024-08-25 PROCEDURE — 94640 AIRWAY INHALATION TREATMENT: CPT

## 2024-08-25 PROCEDURE — 83880 ASSAY OF NATRIURETIC PEPTIDE: CPT

## 2024-08-25 PROCEDURE — 2500000002 HC RX 250 W HCPCS SELF ADMINISTERED DRUGS (ALT 637 FOR MEDICARE OP, ALT 636 FOR OP/ED): Performed by: INTERNAL MEDICINE

## 2024-08-25 PROCEDURE — 85025 COMPLETE CBC W/AUTO DIFF WBC: CPT

## 2024-08-25 PROCEDURE — 8E0ZXY6 ISOLATION: ICD-10-PCS | Performed by: INTERNAL MEDICINE

## 2024-08-25 PROCEDURE — 36415 COLL VENOUS BLD VENIPUNCTURE: CPT

## 2024-08-25 PROCEDURE — 1200000002 HC GENERAL ROOM WITH TELEMETRY DAILY

## 2024-08-25 PROCEDURE — 2500000002 HC RX 250 W HCPCS SELF ADMINISTERED DRUGS (ALT 637 FOR MEDICARE OP, ALT 636 FOR OP/ED)

## 2024-08-25 PROCEDURE — 96365 THER/PROPH/DIAG IV INF INIT: CPT

## 2024-08-25 PROCEDURE — 84484 ASSAY OF TROPONIN QUANT: CPT | Performed by: INTERNAL MEDICINE

## 2024-08-25 PROCEDURE — 96367 TX/PROPH/DG ADDL SEQ IV INF: CPT

## 2024-08-25 PROCEDURE — 87636 SARSCOV2 & INF A&B AMP PRB: CPT

## 2024-08-25 PROCEDURE — 83735 ASSAY OF MAGNESIUM: CPT

## 2024-08-25 PROCEDURE — 99285 EMERGENCY DEPT VISIT HI MDM: CPT | Mod: 25

## 2024-08-25 PROCEDURE — 2500000001 HC RX 250 WO HCPCS SELF ADMINISTERED DRUGS (ALT 637 FOR MEDICARE OP)

## 2024-08-25 PROCEDURE — 82435 ASSAY OF BLOOD CHLORIDE: CPT

## 2024-08-25 PROCEDURE — 87040 BLOOD CULTURE FOR BACTERIA: CPT | Mod: AHULAB

## 2024-08-25 PROCEDURE — 2500000004 HC RX 250 GENERAL PHARMACY W/ HCPCS (ALT 636 FOR OP/ED)

## 2024-08-25 PROCEDURE — 80053 COMPREHEN METABOLIC PANEL: CPT

## 2024-08-25 PROCEDURE — 71046 X-RAY EXAM CHEST 2 VIEWS: CPT | Performed by: STUDENT IN AN ORGANIZED HEALTH CARE EDUCATION/TRAINING PROGRAM

## 2024-08-25 RX ORDER — ALBUTEROL SULFATE 0.83 MG/ML
2.5 SOLUTION RESPIRATORY (INHALATION) ONCE
Status: COMPLETED | OUTPATIENT
Start: 2024-08-25 | End: 2024-08-25

## 2024-08-25 RX ORDER — CEFTRIAXONE 1 G/50ML
1 INJECTION, SOLUTION INTRAVENOUS ONCE
Status: COMPLETED | OUTPATIENT
Start: 2024-08-25 | End: 2024-08-25

## 2024-08-25 RX ORDER — IPRATROPIUM BROMIDE AND ALBUTEROL SULFATE 2.5; .5 MG/3ML; MG/3ML
3 SOLUTION RESPIRATORY (INHALATION) EVERY 20 MIN
Status: DISPENSED | OUTPATIENT
Start: 2024-08-25 | End: 2024-08-25

## 2024-08-25 RX ORDER — DICYCLOMINE HYDROCHLORIDE 10 MG/1
10 CAPSULE ORAL ONCE
Status: COMPLETED | OUTPATIENT
Start: 2024-08-25 | End: 2024-08-25

## 2024-08-25 ASSESSMENT — PAIN - FUNCTIONAL ASSESSMENT: PAIN_FUNCTIONAL_ASSESSMENT: 0-10

## 2024-08-25 ASSESSMENT — PAIN SCALES - GENERAL
PAINLEVEL_OUTOF10: 0 - NO PAIN
PAINLEVEL_OUTOF10: 0 - NO PAIN

## 2024-08-25 NOTE — ED PROVIDER NOTES
Emergency Department Provider Note        History of Present Illness     History provided by: Patient and Family Member  Limitations to History:  shortness of breath  External Records Reviewed with Brief Summary:  Multiple discharge summaries showing IV antibiotics for cystitis and nephrolithiasis    HPI:  Antonio Willis is a 79 y.o. male with history of pacemaker placement for sinus node dysfunction and bradycardia (roughly 13 years ago), prostate cancer, CKD, HLD, multiple sclerosis, tobacco use, and frequent nephrolithiasis with cystitis presenting to the emergency department with increased oxygen requirement at home, shortness of breath, productive cough and malaise.  Wife noted that he was able present on room air at home called EMS due to increased weakness today.  Declines any fevers, chills, or any chest pain.  Patient was diagnosed with COVID about a week ago, however wife tested for COVID at home, states that he was negative.  Normally he is not in any oxygen at home.      Physical Exam   Triage vitals:  T 36.8 °C (98.3 °F)  HR 76  /75  RR 20  O2 96 % Supplemental oxygen    GEN:  A&Ox3, no acute distress, appears uncomfortable. Conversational and appropriate.    HEENT: Normocephalic, atraumatic. Conjunctiva pink with no redness or exudates. Hearing grossly intact. Moist mucous membranes.  CARDIO: Normal rate and regular rhythm. Normal S1, S2  without murmurs, rubs, or gallops.   PULM: Increased work of breathing, with audible wheezing and diffuse crackles throughout, worse on the right. No stridor..  GI: Soft, non-tender, non-distended. No rebound tenderness or guarding.   SKIN: Warm and dry, no rashes, lesions, petechiae, or purpura.  MSK: ROM intact in all 4 extremities without contractures or pain. No peripheral edema, contusions, or wounds.    NEURO: No focal findings identified. No confusion or gross mental status changes.  PSYCH: Appropriate mood and behavior, converses and responds  appropriately during exam.    Medical Decision Making & ED Course   Medical Decision Makin y.o. male with history of pacemaker placement for sinus node dysfunction and bradycardia (roughly 13 years ago), prostate cancer, CKD, HLD, multiple sclerosis, tobacco use, and frequent nephrolithiasis with cystitis presenting to the emergency department with increased oxygen requirement at home, shortness of breath requiring supplementary oxygen of 2 L, productive cough and malaise and was found to have COVID-19.  VBG was ordered in the setting of increased work of breathing and did not note any acute acid-base disturbances. Lab work noted likely dehydration as evidenced by hypernatremia, and mildly elevated troponin, although declining chest pain while here in the ED.  EKG noted paced rhythm, without any issues with failure to capture.  Low suspicion for bacterial pneumonia given normal white blood cell count without neutrophilia.  Chest x-ray did note findings suggestive of viral pneumonia in addition to fluid overload.  Discussed with admitting team, and patient was accepted for further management.     ----      Differential diagnoses considered include but are not limited to: Pneumonia, COVID-19, fluid overload, cardiac dysfunction, electrolyte abnormalities, pacemaker failure     Social Determinants of Health which Significantly Impact Care: None identified     EKG Independent Interpretation: EKG interpreted by myself. Please see ED Course for full interpretation.    Independent Result Review and Interpretation: Relevant laboratory and radiographic results were reviewed and independently interpreted by myself.  As necessary, they are commented on in the ED Course.    Chronic conditions affecting the patient's care: As documented above in [facility]    The patient was discussed with the following consultants/services: None    Care Considerations: As documented above in [facility]    ED Course:  ED Course as of 24 1636   Sun  Aug 25, 2024   1960 SS for CG    From SNF with generalized weaknesss, malaise, borderline hypoxic now on NC. Has post covid PNA. Admitted med [SS]      ED Course User Index  [SS] Steffen Simerlink, MD         Diagnoses as of 09/04/24 1636   Oxygen deficiency   Pneumonia of right lung due to infectious organism, unspecified part of lung     Disposition   As a result of their workup, the patient will require admission to the hospital.  The patient was informed of his diagnosis.  The patient was given the opportunity to ask questions and I answered them. The patient agreed to be admitted to the hospital.    Procedures   Procedures    Patient seen and discussed with ED attending physician.    Kimberly Lam DO  Emergency Medicine       Kimberly Lam DO  Resident  09/04/24 3881

## 2024-08-26 LAB
ALBUMIN SERPL BCP-MCNC: 3.1 G/DL (ref 3.4–5)
ALP SERPL-CCNC: 80 U/L (ref 33–136)
ALT SERPL W P-5'-P-CCNC: 16 U/L (ref 10–52)
ANION GAP SERPL CALC-SCNC: 18 MMOL/L (ref 10–20)
AST SERPL W P-5'-P-CCNC: 19 U/L (ref 9–39)
BILIRUB SERPL-MCNC: 0.3 MG/DL (ref 0–1.2)
BUN SERPL-MCNC: 71 MG/DL (ref 6–23)
CALCIUM SERPL-MCNC: 8.8 MG/DL (ref 8.6–10.3)
CHLORIDE SERPL-SCNC: 115 MMOL/L (ref 98–107)
CO2 SERPL-SCNC: 15 MMOL/L (ref 21–32)
CREAT SERPL-MCNC: 4.94 MG/DL (ref 0.5–1.3)
EGFRCR SERPLBLD CKD-EPI 2021: 11 ML/MIN/1.73M*2
ERYTHROCYTE [DISTWIDTH] IN BLOOD BY AUTOMATED COUNT: 14.1 % (ref 11.5–14.5)
GLUCOSE SERPL-MCNC: 179 MG/DL (ref 74–99)
HCT VFR BLD AUTO: 39.5 % (ref 41–52)
HGB BLD-MCNC: 12.1 G/DL (ref 13.5–17.5)
MCH RBC QN AUTO: 32.4 PG (ref 26–34)
MCHC RBC AUTO-ENTMCNC: 30.6 G/DL (ref 32–36)
MCV RBC AUTO: 106 FL (ref 80–100)
NRBC BLD-RTO: 0 /100 WBCS (ref 0–0)
PLATELET # BLD AUTO: 151 X10*3/UL (ref 150–450)
POTASSIUM SERPL-SCNC: 4.6 MMOL/L (ref 3.5–5.3)
PROT SERPL-MCNC: 6.1 G/DL (ref 6.4–8.2)
RBC # BLD AUTO: 3.74 X10*6/UL (ref 4.5–5.9)
SODIUM SERPL-SCNC: 143 MMOL/L (ref 136–145)
WBC # BLD AUTO: 3.3 X10*3/UL (ref 4.4–11.3)

## 2024-08-26 PROCEDURE — 36415 COLL VENOUS BLD VENIPUNCTURE: CPT | Performed by: INTERNAL MEDICINE

## 2024-08-26 PROCEDURE — 1200000002 HC GENERAL ROOM WITH TELEMETRY DAILY

## 2024-08-26 PROCEDURE — 2500000001 HC RX 250 WO HCPCS SELF ADMINISTERED DRUGS (ALT 637 FOR MEDICARE OP): Performed by: PHARMACIST

## 2024-08-26 PROCEDURE — 84075 ASSAY ALKALINE PHOSPHATASE: CPT | Performed by: INTERNAL MEDICINE

## 2024-08-26 PROCEDURE — 85027 COMPLETE CBC AUTOMATED: CPT | Performed by: INTERNAL MEDICINE

## 2024-08-26 PROCEDURE — 2500000005 HC RX 250 GENERAL PHARMACY W/O HCPCS: Performed by: INTERNAL MEDICINE

## 2024-08-26 PROCEDURE — 2500000004 HC RX 250 GENERAL PHARMACY W/ HCPCS (ALT 636 FOR OP/ED): Performed by: INTERNAL MEDICINE

## 2024-08-26 PROCEDURE — 2500000001 HC RX 250 WO HCPCS SELF ADMINISTERED DRUGS (ALT 637 FOR MEDICARE OP): Performed by: INTERNAL MEDICINE

## 2024-08-26 PROCEDURE — 2500000002 HC RX 250 W HCPCS SELF ADMINISTERED DRUGS (ALT 637 FOR MEDICARE OP, ALT 636 FOR OP/ED): Performed by: INTERNAL MEDICINE

## 2024-08-26 RX ORDER — ACETAMINOPHEN 160 MG/5ML
650 SOLUTION ORAL EVERY 4 HOURS PRN
Status: DISCONTINUED | OUTPATIENT
Start: 2024-08-26 | End: 2024-08-28 | Stop reason: HOSPADM

## 2024-08-26 RX ORDER — DEXTROSE MONOHYDRATE AND SODIUM CHLORIDE 5; .45 G/100ML; G/100ML
75 INJECTION, SOLUTION INTRAVENOUS CONTINUOUS
Status: ACTIVE | OUTPATIENT
Start: 2024-08-26 | End: 2024-08-26

## 2024-08-26 RX ORDER — ONDANSETRON HYDROCHLORIDE 2 MG/ML
4 INJECTION, SOLUTION INTRAVENOUS EVERY 8 HOURS PRN
Status: DISCONTINUED | OUTPATIENT
Start: 2024-08-26 | End: 2024-08-28 | Stop reason: HOSPADM

## 2024-08-26 RX ORDER — POLYETHYLENE GLYCOL 3350 17 G/17G
17 POWDER, FOR SOLUTION ORAL DAILY
Status: DISCONTINUED | OUTPATIENT
Start: 2024-08-26 | End: 2024-08-28 | Stop reason: HOSPADM

## 2024-08-26 RX ORDER — ACETAMINOPHEN 325 MG/1
650 TABLET ORAL EVERY 4 HOURS PRN
Status: DISCONTINUED | OUTPATIENT
Start: 2024-08-26 | End: 2024-08-28 | Stop reason: HOSPADM

## 2024-08-26 RX ORDER — TAMSULOSIN HYDROCHLORIDE 0.4 MG/1
0.4 CAPSULE ORAL NIGHTLY
Status: DISCONTINUED | OUTPATIENT
Start: 2024-08-26 | End: 2024-08-28 | Stop reason: HOSPADM

## 2024-08-26 RX ORDER — FERROUS SULFATE 325(65) MG
65 TABLET ORAL DAILY
Status: DISCONTINUED | OUTPATIENT
Start: 2024-08-26 | End: 2024-08-28 | Stop reason: HOSPADM

## 2024-08-26 RX ORDER — DEXAMETHASONE 6 MG/1
6 TABLET ORAL DAILY
Status: DISCONTINUED | OUTPATIENT
Start: 2024-08-26 | End: 2024-08-28 | Stop reason: HOSPADM

## 2024-08-26 RX ORDER — SODIUM BICARBONATE 650 MG/1
650 TABLET ORAL 2 TIMES DAILY
Status: DISCONTINUED | OUTPATIENT
Start: 2024-08-26 | End: 2024-08-27

## 2024-08-26 RX ORDER — ROSUVASTATIN CALCIUM 10 MG/1
10 TABLET, COATED ORAL NIGHTLY
Status: DISCONTINUED | OUTPATIENT
Start: 2024-08-26 | End: 2024-08-28 | Stop reason: HOSPADM

## 2024-08-26 RX ORDER — FAMOTIDINE 20 MG/1
10 TABLET, FILM COATED ORAL NIGHTLY
Status: DISCONTINUED | OUTPATIENT
Start: 2024-08-26 | End: 2024-08-28 | Stop reason: HOSPADM

## 2024-08-26 RX ORDER — TALC
3 POWDER (GRAM) TOPICAL NIGHTLY PRN
Status: DISCONTINUED | OUTPATIENT
Start: 2024-08-26 | End: 2024-08-28 | Stop reason: HOSPADM

## 2024-08-26 RX ORDER — HEPARIN SODIUM 5000 [USP'U]/ML
5000 INJECTION, SOLUTION INTRAVENOUS; SUBCUTANEOUS EVERY 8 HOURS
Status: DISCONTINUED | OUTPATIENT
Start: 2024-08-26 | End: 2024-08-28 | Stop reason: HOSPADM

## 2024-08-26 RX ORDER — FAMOTIDINE 20 MG/1
20 TABLET, FILM COATED ORAL NIGHTLY
Status: DISCONTINUED | OUTPATIENT
Start: 2024-08-26 | End: 2024-08-26

## 2024-08-26 RX ORDER — ONDANSETRON 4 MG/1
4 TABLET, FILM COATED ORAL EVERY 8 HOURS PRN
Status: DISCONTINUED | OUTPATIENT
Start: 2024-08-26 | End: 2024-08-28 | Stop reason: HOSPADM

## 2024-08-26 RX ORDER — ACETAMINOPHEN 650 MG/1
650 SUPPOSITORY RECTAL EVERY 4 HOURS PRN
Status: DISCONTINUED | OUTPATIENT
Start: 2024-08-26 | End: 2024-08-28 | Stop reason: HOSPADM

## 2024-08-26 SDOH — SOCIAL STABILITY: SOCIAL INSECURITY: DO YOU FEEL UNSAFE GOING BACK TO THE PLACE WHERE YOU ARE LIVING?: NO

## 2024-08-26 SDOH — SOCIAL STABILITY: SOCIAL INSECURITY: WERE YOU ABLE TO COMPLETE ALL THE BEHAVIORAL HEALTH SCREENINGS?: YES

## 2024-08-26 SDOH — SOCIAL STABILITY: SOCIAL INSECURITY: ARE YOU OR HAVE YOU BEEN THREATENED OR ABUSED PHYSICALLY, EMOTIONALLY, OR SEXUALLY BY ANYONE?: NO

## 2024-08-26 SDOH — SOCIAL STABILITY: SOCIAL INSECURITY: DO YOU FEEL ANYONE HAS EXPLOITED OR TAKEN ADVANTAGE OF YOU FINANCIALLY OR OF YOUR PERSONAL PROPERTY?: NO

## 2024-08-26 SDOH — SOCIAL STABILITY: SOCIAL INSECURITY: HAVE YOU HAD ANY THOUGHTS OF HARMING ANYONE ELSE?: NO

## 2024-08-26 SDOH — SOCIAL STABILITY: SOCIAL INSECURITY: ABUSE: ADULT

## 2024-08-26 SDOH — SOCIAL STABILITY: SOCIAL INSECURITY: HAS ANYONE EVER THREATENED TO HURT YOUR FAMILY OR YOUR PETS?: NO

## 2024-08-26 SDOH — SOCIAL STABILITY: SOCIAL INSECURITY: ARE THERE ANY APPARENT SIGNS OF INJURIES/BEHAVIORS THAT COULD BE RELATED TO ABUSE/NEGLECT?: NO

## 2024-08-26 SDOH — SOCIAL STABILITY: SOCIAL INSECURITY: HAVE YOU HAD THOUGHTS OF HARMING ANYONE ELSE?: NO

## 2024-08-26 SDOH — SOCIAL STABILITY: SOCIAL INSECURITY: DOES ANYONE TRY TO KEEP YOU FROM HAVING/CONTACTING OTHER FRIENDS OR DOING THINGS OUTSIDE YOUR HOME?: NO

## 2024-08-26 ASSESSMENT — COGNITIVE AND FUNCTIONAL STATUS - GENERAL
HELP NEEDED FOR BATHING: A LITTLE
MOBILITY SCORE: 14
PATIENT BASELINE BEDBOUND: NO
TURNING FROM BACK TO SIDE WHILE IN FLAT BAD: A LITTLE
CLIMB 3 TO 5 STEPS WITH RAILING: A LOT
HELP NEEDED FOR BATHING: A LITTLE
STANDING UP FROM CHAIR USING ARMS: A LOT
DRESSING REGULAR LOWER BODY CLOTHING: A LITTLE
DRESSING REGULAR LOWER BODY CLOTHING: A LITTLE
MOVING FROM LYING ON BACK TO SITTING ON SIDE OF FLAT BED WITH BEDRAILS: A LITTLE
MOVING TO AND FROM BED TO CHAIR: A LOT
DRESSING REGULAR UPPER BODY CLOTHING: A LITTLE
DRESSING REGULAR UPPER BODY CLOTHING: A LITTLE
CLIMB 3 TO 5 STEPS WITH RAILING: A LOT
DAILY ACTIVITIY SCORE: 20
MOBILITY SCORE: 14
MOVING FROM LYING ON BACK TO SITTING ON SIDE OF FLAT BED WITH BEDRAILS: A LITTLE
TURNING FROM BACK TO SIDE WHILE IN FLAT BAD: A LITTLE
TOILETING: A LITTLE
MOVING TO AND FROM BED TO CHAIR: A LOT
TOILETING: A LITTLE
WALKING IN HOSPITAL ROOM: A LOT
DAILY ACTIVITIY SCORE: 20
STANDING UP FROM CHAIR USING ARMS: A LOT
WALKING IN HOSPITAL ROOM: A LOT

## 2024-08-26 ASSESSMENT — ACTIVITIES OF DAILY LIVING (ADL)
LACK_OF_TRANSPORTATION: NO
GROOMING: NEEDS ASSISTANCE
DRESSING YOURSELF: NEEDS ASSISTANCE
PATIENT'S MEMORY ADEQUATE TO SAFELY COMPLETE DAILY ACTIVITIES?: YES
FEEDING YOURSELF: INDEPENDENT
JUDGMENT_ADEQUATE_SAFELY_COMPLETE_DAILY_ACTIVITIES: YES
WALKS IN HOME: INDEPENDENT
HEARING - RIGHT EAR: FUNCTIONAL
ASSISTIVE_DEVICE: WALKER;CANE
HEARING - LEFT EAR: FUNCTIONAL
ADEQUATE_TO_COMPLETE_ADL: YES
BATHING: NEEDS ASSISTANCE
TOILETING: NEEDS ASSISTANCE

## 2024-08-26 ASSESSMENT — ENCOUNTER SYMPTOMS
WEAKNESS: 1
COLOR CHANGE: 0
FACIAL ASYMMETRY: 0
EYE DISCHARGE: 0
DYSURIA: 0
SHORTNESS OF BREATH: 1
CHEST TIGHTNESS: 0
AGITATION: 0
DIZZINESS: 0
WHEEZING: 0
BACK PAIN: 0
CHOKING: 0
FEVER: 0
TREMORS: 0
ABDOMINAL PAIN: 0
DIFFICULTY URINATING: 0
SEIZURES: 0
UNEXPECTED WEIGHT CHANGE: 0
EYE ITCHING: 0
ABDOMINAL DISTENTION: 0
APPETITE CHANGE: 1
ACTIVITY CHANGE: 1
STRIDOR: 0
FATIGUE: 1
ARTHRALGIAS: 0
ADENOPATHY: 0

## 2024-08-26 ASSESSMENT — LIFESTYLE VARIABLES
AUDIT-C TOTAL SCORE: 1
HOW OFTEN DO YOU HAVE A DRINK CONTAINING ALCOHOL: MONTHLY OR LESS
AUDIT-C TOTAL SCORE: 1
HOW OFTEN DO YOU HAVE 6 OR MORE DRINKS ON ONE OCCASION: NEVER
HOW MANY STANDARD DRINKS CONTAINING ALCOHOL DO YOU HAVE ON A TYPICAL DAY: 1 OR 2
SKIP TO QUESTIONS 9-10: 1

## 2024-08-26 ASSESSMENT — PATIENT HEALTH QUESTIONNAIRE - PHQ9
1. LITTLE INTEREST OR PLEASURE IN DOING THINGS: NOT AT ALL
2. FEELING DOWN, DEPRESSED OR HOPELESS: NOT AT ALL
SUM OF ALL RESPONSES TO PHQ9 QUESTIONS 1 & 2: 0

## 2024-08-26 NOTE — PROGRESS NOTES
Emergency Department Transition of Care Note       Signout   I received Antonio Willis in signout from Dr. Virk.  Please see the ED Provider Note for all HPI, PE and MDM up to the time of signout which is documented in the ED course.  This is in addition to the primary record.    See ED course of sign out comments.    ED Course & Medical Decision Making   ED Course:  ED Course as of 08/25/24 2010   Sun Aug 25, 2024   1960 SS for CG    From SNF with generalized weaknesss, malaise, borderline hypoxic now on NC. Has post covid PNA. Admitted med [SS]      ED Course User Index  [SS] Steffen Simerlink, MD         Diagnoses as of 08/25/24 2010   Oxygen deficiency   Pneumonia of right lung due to infectious organism, unspecified part of lung       Medical Decision Making:  Under my care, no acute events, admitted to medicine by prior provider.    Procedures   Procedures    Steffen Simerlink, MD

## 2024-08-26 NOTE — NURSING NOTE
Updated wife and patient on plan of care at this time. Pt and wife appreciate updates and have no further questions related to plan of care at this time.    Notified Dr. Avina of request that updates be called to Emma (pt wife) as pt has difficulty remembering plan. Cell phone number sent to Dr. Avina per wife request.

## 2024-08-26 NOTE — H&P
History Of Present Illness   hypoxia.  He is a 79-year-old  male, who is known to have history of cardiac pacemaker, chronic kidney disease stage V, with solitary kidney, left hide hydronephrosis chronic, with left ureteric stone, multiple sclerosis, hypertension, hyperlipidemia, he was diagnosed with COVID about 1 week ago, he was feeling well, he did not seek care, yesterday he was feeling weak, the wife checked the oxygenation level, the pulse ox was only 84% on room air, he was brought to the emergency department, he was diagnosed positive for COVID and also pneumonia, he was started on pneumonia protocol, he was admitted here for further management.  He is on 4 L of oxygen.  He did get ceftriaxone Zithromax and dexamethasone.  His kidney functions are worse than his baseline.    Past Medical History  He has a past medical history of Cardiac pacemaker, Chronic anemia, CKD (chronic kidney disease), stage IV (Multi), Granuloma annulare, Hemiplegia, unspecified affecting right dominant side (Multi), History of GI bleed, History of prostate cancer, Hydronephrosis concurrent with and due to calculi of kidney and ureter, Hyperkalemia, Hyperlipidemia, unspecified, Multiple sclerosis (Multi), Pyuria, Recurrent kidney stones, and Tremor, unspecified.    Surgical History  He has a past surgical history that includes Hernia repair (Right, 1993); US guided abscess drain (11/24/2021); IR placement of nephrostomy catheter (11/24/2021); Prostate biopsy (02/13/2008); ORIF ankle fracture (Right, 2003); Cystoscopy w/ laser lithotripsy (Left); Extracorporeal shock wave lithotripsy (Left); Bladder stone removal (11/09/2007); and pacemaker placement (10/17/2011).     Social History  He reports that he has been smoking cigars. He has been exposed to tobacco smoke. He does not have any smokeless tobacco history on file. He reports current alcohol use of about 1.0 standard drink of alcohol per week. He reports that he does not  use drugs.    Family History  Family History   Problem Relation Name Age of Onset    Leukemia Mother      Heart attack Father      Diabetes Father      Diabetes Sister      Tremor Sister      Leukemia Brother      Parkinsonism Father's Sister          Allergies  Patient has no known allergies.    Review of Systems   Constitutional:  Positive for activity change, appetite change and fatigue. Negative for fever and unexpected weight change.   HENT:  Positive for congestion. Negative for dental problem, drooling, ear discharge and ear pain.    Eyes:  Negative for discharge and itching.   Respiratory:  Positive for shortness of breath. Negative for choking, chest tightness, wheezing and stridor.    Cardiovascular:  Negative for chest pain and leg swelling.   Gastrointestinal:  Negative for abdominal distention and abdominal pain.   Endocrine: Negative for cold intolerance.   Genitourinary:  Negative for difficulty urinating and dysuria.   Musculoskeletal:  Negative for arthralgias and back pain.   Skin:  Negative for color change.   Neurological:  Positive for weakness. Negative for dizziness, tremors, seizures and facial asymmetry.   Hematological:  Negative for adenopathy.   Psychiatric/Behavioral:  Negative for agitation and behavioral problems.         Physical Exam  Constitutional:       Appearance: Normal appearance.   HENT:      Head: Normocephalic and atraumatic.   Cardiovascular:      Rate and Rhythm: Normal rate.      Pulses: Normal pulses.      Heart sounds: Normal heart sounds.   Pulmonary:      Effort: Pulmonary effort is normal.   Abdominal:      General: Abdomen is flat.      Palpations: Abdomen is soft. There is no mass.   Musculoskeletal:         General: No tenderness.      Cervical back: Normal range of motion.      Right lower leg: No edema.   Skin:     Coloration: Skin is not jaundiced or pale.   Neurological:      General: No focal deficit present.      Mental Status: He is alert.      Motor:  "Weakness present.   Psychiatric:         Mood and Affect: Mood normal.          Last Recorded Vitals  Blood pressure 131/72, pulse 70, temperature 36.3 °C (97.4 °F), temperature source Oral, resp. rate 16, height 1.676 m (5' 6\"), weight 75.8 kg (167 lb), SpO2 94%.    Relevant Results  Results for orders placed or performed during the hospital encounter of 08/25/24 (from the past 96 hour(s))   CBC and Auto Differential   Result Value Ref Range    WBC 7.1 4.4 - 11.3 x10*3/uL    nRBC 0.0 0.0 - 0.0 /100 WBCs    RBC 4.49 (L) 4.50 - 5.90 x10*6/uL    Hemoglobin 14.3 13.5 - 17.5 g/dL    Hematocrit 44.9 41.0 - 52.0 %     80 - 100 fL    MCH 31.8 26.0 - 34.0 pg    MCHC 31.8 (L) 32.0 - 36.0 g/dL    RDW 14.2 11.5 - 14.5 %    Platelets 177 150 - 450 x10*3/uL    Neutrophils % 75.3 40.0 - 80.0 %    Immature Granulocytes %, Automated 0.7 0.0 - 0.9 %    Lymphocytes % 12.3 13.0 - 44.0 %    Monocytes % 11.4 2.0 - 10.0 %    Eosinophils % 0.0 0.0 - 6.0 %    Basophils % 0.3 0.0 - 2.0 %    Neutrophils Absolute 5.37 1.60 - 5.50 x10*3/uL    Immature Granulocytes Absolute, Automated 0.05 0.00 - 0.50 x10*3/uL    Lymphocytes Absolute 0.88 0.80 - 3.00 x10*3/uL    Monocytes Absolute 0.81 (H) 0.05 - 0.80 x10*3/uL    Eosinophils Absolute 0.00 0.00 - 0.40 x10*3/uL    Basophils Absolute 0.02 0.00 - 0.10 x10*3/uL   Magnesium   Result Value Ref Range    Magnesium 2.40 1.60 - 2.40 mg/dL   Comprehensive metabolic panel   Result Value Ref Range    Glucose 73 (L) 74 - 99 mg/dL    Sodium 146 (H) 136 - 145 mmol/L    Potassium 4.6 3.5 - 5.3 mmol/L    Chloride 115 (H) 98 - 107 mmol/L    Bicarbonate 18 (L) 21 - 32 mmol/L    Anion Gap 18 10 - 20 mmol/L    Urea Nitrogen 69 (H) 6 - 23 mg/dL    Creatinine 4.68 (H) 0.50 - 1.30 mg/dL    eGFR 12 (L) >60 mL/min/1.73m*2    Calcium 9.9 8.6 - 10.3 mg/dL    Albumin 3.8 3.4 - 5.0 g/dL    Alkaline Phosphatase 106 33 - 136 U/L    Total Protein 7.6 6.4 - 8.2 g/dL    AST 30 9 - 39 U/L    Bilirubin, Total 0.5 0.0 - 1.2 " mg/dL    ALT 19 10 - 52 U/L   B-Type Natriuretic Peptide   Result Value Ref Range    BNP 60 0 - 99 pg/mL   Blood Gas Venous Full Panel   Result Value Ref Range    POCT pH, Venous 7.27 (L) 7.33 - 7.43 pH    POCT pCO2, Venous 41 41 - 51 mm Hg    POCT pO2, Venous 23 (L) 35 - 45 mm Hg    POCT SO2, Venous 28 (L) 45 - 75 %    POCT Oxy Hemoglobin, Venous 27.5 (L) 45.0 - 75.0 %    POCT Hematocrit Calculated, Venous 42.0 41.0 - 52.0 %    POCT Sodium, Venous 144 136 - 145 mmol/L    POCT Potassium, Venous 4.8 3.5 - 5.3 mmol/L    POCT Chloride, Venous 115 (H) 98 - 107 mmol/L    POCT Ionized Calicum, Venous 1.30 1.10 - 1.33 mmol/L    POCT Glucose, Venous 90 74 - 99 mg/dL    POCT Lactate, Venous 1.6 0.4 - 2.0 mmol/L    POCT Base Excess, Venous -7.7 (L) -2.0 - 3.0 mmol/L    POCT HCO3 Calculated, Venous 18.8 (L) 22.0 - 26.0 mmol/L    POCT Hemoglobin, Venous 13.9 13.5 - 17.5 g/dL    POCT Anion Gap, Venous 15.0 10.0 - 25.0 mmol/L    Patient Temperature 37.0 degrees Celsius    FiO2 32 %   Blood Culture    Specimen: Peripheral Venipuncture; Blood culture   Result Value Ref Range    Blood Culture Loaded on Instrument - Culture in progress    Troponin I, High Sensitivity   Result Value Ref Range    Troponin I, High Sensitivity 21 (H) 0 - 20 ng/L   Blood Culture    Specimen: Peripheral Venipuncture; Blood culture   Result Value Ref Range    Blood Culture Loaded on Instrument - Culture in progress    Sars-CoV-2 PCR   Result Value Ref Range    Coronavirus 2019, PCR Detected (A) Not Detected   Influenza A, and B PCR   Result Value Ref Range    Flu A Result Not Detected Not Detected    Flu B Result Not Detected Not Detected   POCT GLUCOSE   Result Value Ref Range    POCT Glucose 129 (H) 74 - 99 mg/dL   CBC   Result Value Ref Range    WBC 3.3 (L) 4.4 - 11.3 x10*3/uL    nRBC 0.0 0.0 - 0.0 /100 WBCs    RBC 3.74 (L) 4.50 - 5.90 x10*6/uL    Hemoglobin 12.1 (L) 13.5 - 17.5 g/dL    Hematocrit 39.5 (L) 41.0 - 52.0 %     (H) 80 - 100 fL    MCH  32.4 26.0 - 34.0 pg    MCHC 30.6 (L) 32.0 - 36.0 g/dL    RDW 14.1 11.5 - 14.5 %    Platelets 151 150 - 450 x10*3/uL   Comprehensive metabolic panel   Result Value Ref Range    Glucose 179 (H) 74 - 99 mg/dL    Sodium 143 136 - 145 mmol/L    Potassium 4.6 3.5 - 5.3 mmol/L    Chloride 115 (H) 98 - 107 mmol/L    Bicarbonate 15 (L) 21 - 32 mmol/L    Anion Gap 18 10 - 20 mmol/L    Urea Nitrogen 71 (H) 6 - 23 mg/dL    Creatinine 4.94 (H) 0.50 - 1.30 mg/dL    eGFR 11 (L) >60 mL/min/1.73m*2    Calcium 8.8 8.6 - 10.3 mg/dL    Albumin 3.1 (L) 3.4 - 5.0 g/dL    Alkaline Phosphatase 80 33 - 136 U/L    Total Protein 6.1 (L) 6.4 - 8.2 g/dL    AST 19 9 - 39 U/L    Bilirubin, Total 0.3 0.0 - 1.2 mg/dL    ALT 16 10 - 52 U/L      XR chest 2 views    Result Date: 8/25/2024  Interpreted By:  Stew Prince, STUDY: XR CHEST 2 VIEWS;  8/25/2024 6:43 pm   INDICATION: Signs/Symptoms:r/o PNA.   COMPARISON: CXR 12/21/2011   ACCESSION NUMBER(S): [id number]   ORDERING CLINICIAN: PARAM CAI   FINDINGS: Left chest wall pacemaker with leads in expected position.   CARDIOMEDIASTINAL SILHOUETTE: Cardiomediastinal silhouette is normal in size and configuration.   LUNGS: Prominence of the interstitial markings likely reflecting pulmonary edema. Bibasilar consolidations likely reflecting atelectasis.   ABDOMEN: No remarkable upper abdominal findings.   BONES: No acute osseous abnormality.       Prominence of the interstitial markings may reflect pulmonary edema or viral pneumonia. There are additional bibasilar consolidations which are likely to represent atelectasis but superimposed infection is difficult to exclude.   No pleural effusion or pneumothorax.   MACRO: None   Signed by: Stew Prince 8/25/2024 7:27 PM Dictation workstation:   [id number]      Medications  famotidine, 10 mg, oral, Nightly  ferrous sulfate (325 mg ferrous sulfate), 65 mg of iron, oral, Daily  heparin (porcine), 5,000 Units, subcutaneous, q8h  oxygen, , inhalation,  Continuous - Inhalation  polyethylene glycol, 17 g, oral, Daily  rosuvastatin, 10 mg, oral, Nightly  sodium bicarbonate, 650 mg, oral, BID  tamsulosin, 0.4 mg, oral, Nightly       PRN medications: acetaminophen **OR** acetaminophen **OR** acetaminophen, acetaminophen **OR** acetaminophen **OR** acetaminophen, melatonin, ondansetron **OR** ondansetron     Assessment/Plan   Principal Problem:    Oxygen deficiency      79-year-old  male, he is known to have pacemaker, and also known to have multiple sclerosis, CKD 5, chronic left hydronephrosis, hypertension, who was recently diagnosed with COVID about a week ago, he was feeling okay, yesterday the wife checked his oxygen level as he was feeling tired, diagnosed with hypoxia of 84%, sent to the emergency department, he was diagnosed with pneumonia, he was given IV ceftriaxone and Zithromax, dexamethasone, he was given 4 L of oxygen, he is here for further management.  Acute hypoxia, most likely secondary to post-COVID with underlying viral or bacterial pneumonia, ID is following for now we will continue with the pneumonia protocol.  Baseline CKD 5, with left hydronephrosis, he does see urologist as an outpatient, we are getting nephrology to see.  Pacemaker permanent, sees cardiology as an outpatient, we will get the cardiologist also.  History of multiple sclerosis he sees neurologist as an outpatient we will continue to see support his care.  Hypertension same  Hyperlipidemia same  DVT prophylaxis           I spent 65 minutes in the professional and overall care of this patient.    Chicho Avina MD

## 2024-08-26 NOTE — PROGRESS NOTES
08/26/24 1034   Discharge Planning   Living Arrangements Spouse/significant other   Support Systems Spouse/significant other   Assistance Needed ADLs   Type of Residence Private residence   Expected Discharge Disposition SNF   Does the patient need discharge transport arranged? Yes   RoundTrip coordination needed? Yes   Has discharge transport been arranged? No   Financial Resource Strain   How hard is it for you to pay for the very basics like food, housing, medical care, and heating? Not hard   Housing Stability   In the last 12 months, was there a time when you were not able to pay the mortgage or rent on time? N   At any time in the past 12 months, were you homeless or living in a shelter (including now)? N   Transportation Needs   In the past 12 months, has lack of transportation kept you from medical appointments or from getting medications? no   In the past 12 months, has lack of transportation kept you from meetings, work, or from getting things needed for daily living? No     TCC soke to pt wife on the phone. PT OT to malou. Emiled SNF list to wife [email or web address]

## 2024-08-26 NOTE — CONSULTS
Inpatient consult to Nephrology  Consult performed by: Hola Braun DO  Consult ordered by: Chicho Avina MD      Reason For Consult  Stage V chronic kidney disease, hypernatremia     History Of Present Illness  Antonio Willis is a 79 y.o. male with a PMHx of recurrent nephrolithiasis, hyperlipidemia, prostate adenocarcinoma, multiple sclerosis, sinus node dysfunction status post dual chamber pacemaker placement, bifascicular block, history of GI bleed with gastric and duodenal ulcers, and stage G5 chronic kidney disease who is under the care of Dr. Higgins.  He was recently admitted in June and noted to have severe left hydronephrosis.  This was a new finding since late 2023.  There were nonobstructing left renal stones.  He received antimicrobial coverage.  Was seen by urology, they felt hydronephrosis was chronic with chronic pyuria.  No intervention was planned.  He comes in now with a productive cough, shortness of breath and generalized malaise.  He was found to be COVID-19 positive.  Chest plain film showed interstitial markings concerning for edema versus viral pneumonia.  He was hemodynamically stable.  His creatinine was 4.68, BUN 69, GFR of 12, bicarbonate 18, sodium of 146.  He was placed on pneumonia antimicrobial coverage with Rocephin and azithromycin.  Decadron was ordered.  He is requiring 5 to 6 L/min nasal cannula.  5% dextrose and 0.45% saline was started. Nephrology is consulted for CKD stage V care.     Past Medical History:   Diagnosis Date    Cardiac pacemaker     Implanted 10/17/2011 due to SSS    Chronic anemia     CKD (chronic kidney disease), stage IV (Multi)     Granuloma annulare     Hemiplegia, unspecified affecting right dominant side (Multi)     Right Hemiparesis from MS    History of GI bleed     UGI due to gastric and duodenal ulcer    History of prostate cancer     dx 02/13/2008; treated with hormone tx and external beam radiation    Hydronephrosis concurrent with and  due to calculi of kidney and ureter     Recurrent episodes on the LEFT    Hyperkalemia     Due to CKD; followed by Dr [place] Higgins    Hyperlipidemia, unspecified     Multiple sclerosis (Multi)     Dx 2011    Pyuria     Chronic    Recurrent kidney stones     Left side    Tremor, unspecified        Past Surgical History:   Procedure Laterality Date    BLADDER STONE REMOVAL  11/09/2007    Cystolithalopaxy    CYSTOSCOPY W/ LASER LITHOTRIPSY Left     12/3/20, 5/6/21, 5/20/21, 8/26/21, 8/24/23    EXTRACORPOREAL SHOCK WAVE LITHOTRIPSY Left     10/01/20, 10/29/20    HERNIA REPAIR Right 1993    Right inguinal hernia repair    IR  NEPHROSTOMY PLACEMENT  11/24/2021    IR  NEPHROSTOMY PLACEMENT 11/24/2021 [facility]    ORIF ANKLE FRACTURE Right 2003    PACEMAKER PLACEMENT  10/17/2011    PROSTATE BIOPSY  02/13/2008    + cancer    US GUIDED ABSCESS DRAIN  11/24/2021    US GUIDED ABSCESS DRAIN 11/24/2021 [facility]       Social History     Tobacco Use    Smoking status: Every Day     Types: Cigars     Passive exposure: Current   Vaping Use    Vaping status: Never Used   Substance Use Topics    Alcohol use: Yes     Alcohol/week: 1.0 standard drink of alcohol     Types: 1 Glasses of wine per week     Comment: 2 month    Drug use: Never        Family History  Family History   Problem Relation Name Age of Onset    Leukemia Mother      Heart attack Father      Diabetes Father      Diabetes Sister      Tremor Sister      Leukemia Brother      Parkinsonism Father's Sister          Allergies  Patient has no known allergies.    Review of Systems   10 point review of systems obtained and negative unless stated in HPI  Physical Exam   Constitutional: Awake and alert. Non toxic. On 5-6 LPM NC   Eyes: PERRL, EOMI, clear sclera   ENMT: mucous membranes moist, no apparent injury,  no lesions seen   Head/Neck: Neck supple, no apparent injury, No JVD, trachea midline   Respiratory/Thorax: Lungs are Clear to auscultation  "  Cardiovascular: Regular, rate and rhythm,  2+ equal  pulses of the extremities, normal S 1and S 2   Gastrointestinal: Nondistended, soft, non-tender, no rebound tenderness or guarding, +BS   Musculoskeletal: Moves all 4 extremities. No joint swelling.   Extremities: No edema.   Neurological: Awake and alert. No focal neurological deficits   Psychological: Appropriate mood and behavior   Skin: Warm and dry          I&O 24HR    Intake/Output Summary (Last 24 hours) at 8/26/2024 1011  Last data filed at 8/25/2024 2041  Gross per 24 hour   Intake 250 ml   Output --   Net 250 ml       Vitals 24HR  Heart Rate:  [60-83]   Temp:  [36.3 °C (97.4 °F)-36.8 °C (98.3 °F)]   Resp:  [0-29]   BP: ()/(50-85)   Height:  [167.6 cm (5' 6\")]   Weight:  [75.8 kg (167 lb)]   SpO2:  [89 %-96 %]     Scheduled Medications  azithromycin, 500 mg, intravenous, q24h  cefTRIAXone, 2 g, intravenous, q24h  famotidine, 10 mg, oral, Nightly  ferrous sulfate (325 mg ferrous sulfate), 65 mg of iron, oral, Daily  heparin (porcine), 5,000 Units, subcutaneous, q8h  oxygen, , inhalation, Continuous - Inhalation  polyethylene glycol, 17 g, oral, Daily  rosuvastatin, 10 mg, oral, Nightly  tamsulosin, 0.4 mg, oral, Nightly      Continuous medications  dextrose 5%-0.45 % sodium chloride, 75 mL/hr, Last Rate: 75 mL/hr (08/26/24 0836)        PRN medications: acetaminophen **OR** acetaminophen **OR** acetaminophen, acetaminophen **OR** acetaminophen **OR** acetaminophen, melatonin, ondansetron **OR** ondansetron     Relevant Results  Results from last 7 days   Lab Units 08/26/24  0827 08/25/24  1904   WBC AUTO x10*3/uL 3.3* 7.1   HEMOGLOBIN g/dL 12.1* 14.3   HEMATOCRIT % 39.5* 44.9   PLATELETS AUTO x10*3/uL 151 177   NEUTROS PCT AUTO %  --  75.3   LYMPHS PCT AUTO %  --  12.3   MONOS PCT AUTO %  --  11.4   EOS PCT AUTO %  --  0.0      Results from last 7 days   Lab Units 08/26/24  0827 08/25/24  1904   SODIUM mmol/L 143 146*   POTASSIUM mmol/L 4.6 4.6 "   CHLORIDE mmol/L 115* 115*   CO2 mmol/L 15* 18*   BUN mg/dL 71* 69*   CREATININE mg/dL 4.94* 4.68*   CALCIUM mg/dL 8.8 9.9   PROTEIN TOTAL g/dL 6.1* 7.6   BILIRUBIN TOTAL mg/dL 0.3 0.5   ALK PHOS U/L 80 106   ALT U/L 16 19   AST U/L 19 30   GLUCOSE mg/dL 179* 73*      XR chest 2 views   Final Result   Prominence of the interstitial markings may reflect pulmonary edema   or viral pneumonia. There are additional bibasilar consolidations   which are likely to represent atelectasis but superimposed infection   is difficult to exclude.        No pleural effusion or pneumothorax.        MACRO:   None        Signed by: Stew Prince 8/25/2024 7:27 PM   Dictation workstation:   [id number]         Interpreted By:  Chase Rivera,   STUDY:  US RENAL COMPLETE;  6/13/2024 2:35 pm      INDICATION:  Signs/Symptoms:decreased urinary output;history stent.      COMPARISON:  10/02/2023      ACCESSION NUMBER(S):  [id number]      ORDERING CLINICIAN:  ANGEL OCAMPO      TECHNIQUE:  Multiple images of the kidneys were obtained  .      FINDINGS:  RIGHT KIDNEY:  The right kidney measures 11.1 cm in length. The renal cortical  echogenicity is increased. No hydronephrosis.      LEFT KIDNEY:  The left kidney measures 14.7 cm in length. There is severe  left-sided hydronephrosis. This is new since the prior examination of  10/02/2023. Multiple left renal stones with the largest measuring 17  mm..      BLADDER:  Suboptimally distended. Debris within the posterior bladder. Apparent  bladder wall thickening. Patient was unable to void.      IMPRESSION:  Severe new left-sided hydronephrosis. Nonobstructing left renal  stones.      Suboptimally distended bladder with apparent bladder wall thickening  and bladder debris. Patient was unable to void.      MACRO:     Assessment/Plan   Antonio Willis is a 79 y.o. male with a PMHx of recurrent nephrolithiasis, hyperlipidemia, prostate adenocarcinoma, multiple sclerosis, sinus node dysfunction status  post dual chamber pacemaker placement, bifascicular block, history of GI bleed with gastric and duodenal ulcers, and stage G5 chronic kidney disease who is under the care of Dr. Higgins.  He was recently admitted in June and noted to have severe left hydronephrosis.  This was a new finding since late 2023.  There were nonobstructing left renal stones.  He received antimicrobial coverage.  Was seen by urology, they felt hydronephrosis was chronic with chronic pyuria.  No intervention was planned.  He comes in now with a productive cough, shortness of breath and generalized malaise.  He was found to be COVID-19 positive.  Chest plain film showed interstitial markings concerning for edema versus viral pneumonia.  He was hemodynamically stable.  His creatinine was 4.68, BUN 69, GFR of 12, bicarbonate 18, sodium of 146.  He was placed on pneumonia antimicrobial coverage with Rocephin and azithromycin.  Decadron was ordered.  He is requiring 5 to 6 L/min nasal cannula.  5% dextrose and 0.45% saline was started. Nephrology is consulted for CKD stage V care.    Mr. Willis has advanced stage G5 chronic kidney disease with presumably solitary renal function having the left hydronephrosis.  His renal function is at baseline.  Given his creatinine stability I will hold off on reimaging his kidneys in the setting of COVID 19 infection.  I will start him on an oral sodium bicarbonate supplement 650 mg twice daily.  Vitamin D was within target 2 months ago.  Intact PTH was elevated at 212.  He is not anemic. We are avoiding nephrotoxins. Trend his RFP.  Will follow with his care.      Principal Problem:    Oxygen deficiency      I spent 55 minutes in the professional and overall care of this patient.      Hola Braun,

## 2024-08-26 NOTE — PROGRESS NOTES
08/26/24 1034   Current Planned Discharge Disposition   Current Planned Discharge Disposition SNF

## 2024-08-26 NOTE — CONSULTS
"INFECTIOUS DISEASE INPATIENT INITIAL CONSULTATION    Referred By: Chicho Avina    Reason For Consult: PNA. covid     HPI:  This is a 79 y.o. male with PMH of multiple sclerosis, prostate adenoCA, CKD, hx PPM placement who presented with cough, shortness of breath, fatigue worsening for about 1 week.    Diagnosed with COVID-19 1 week ago. Was getting more weak and so came here.    Afebrile. Has hypoxia. On 4L NC. WBC normal. COVID-19 positive here to confirm. CXR with viral PNA or edema.    He denies ever being vaccinated for COVID-19. No personal history of prior infection.    Allergies:  Patient has no known allergies.     Vitals (Last 24 Hours):  Heart Rate:  [60-83]   Temp:  [36.3 °C (97.4 °F)-36.8 °C (98.3 °F)]   Resp:  [0-29]   BP: ()/(50-85)   Height:  [167.6 cm (5' 6\")]   Weight:  [75.8 kg (167 lb)]   SpO2:  [89 %-96 %]      PHYSICAL EXAM:  Gen - NAD, 6L NC  Heart - RRR, no murmurs  Lungs - clear bilaterally, no wheezing  Abd - soft, no ttp, BS present  Ext - no LE edema  Skin - no rash    MEDS:    Current Facility-Administered Medications:     acetaminophen (Tylenol) tablet 650 mg, 650 mg, oral, q4h PRN **OR** acetaminophen (Tylenol) oral liquid 650 mg, 650 mg, nasogastric tube, q4h PRN **OR** acetaminophen (Tylenol) suppository 650 mg, 650 mg, rectal, q4h PRN, Chicho Avina MD    acetaminophen (Tylenol) tablet 650 mg, 650 mg, oral, q4h PRN **OR** acetaminophen (Tylenol) oral liquid 650 mg, 650 mg, oral, q4h PRN **OR** acetaminophen (Tylenol) suppository 650 mg, 650 mg, rectal, q4h PRN, Chicho Avina MD    azithromycin 500 mg in dextrose 5% 250 mL IV, 500 mg, intravenous, q24h, Chicho Avina MD    cefTRIAXone (Rocephin) in dextrose 5% IV 2 g, 2 g, intravenous, q24h, Chicho Avina MD    dextrose 5%-0.45 % sodium chloride infusion, 75 mL/hr, intravenous, Continuous, Chicho Avina MD, Last Rate: 75 mL/hr at 08/26/24 0836, 75 mL/hr at " 08/26/24 0836    famotidine (Pepcid) tablet 10 mg, 10 mg, oral, Nightly, Hermes Garcia PharmD    ferrous sulfate (325 mg ferrous sulfate) tablet 325 mg, 65 mg of iron, oral, Daily, Chicho Avina MD, 325 mg at 08/26/24 0837    heparin (porcine) injection 5,000 Units, 5,000 Units, subcutaneous, q8h, Chicho Avina MD, 5,000 Units at 08/26/24 0840    melatonin tablet 3 mg, 3 mg, oral, Nightly PRN, Chicho Avina MD, 3 mg at 08/26/24 0220    ondansetron (Zofran) tablet 4 mg, 4 mg, oral, q8h PRN **OR** ondansetron (Zofran) injection 4 mg, 4 mg, intravenous, q8h PRN, Chicho Avina MD    oxygen (O2) therapy, , inhalation, Continuous - Inhalation, Savanna Virk MD, 4 L/min at 08/25/24 2000    polyethylene glycol (Glycolax, Miralax) packet 17 g, 17 g, oral, Daily, Chicho Avina MD, 17 g at 08/26/24 0836    rosuvastatin (Crestor) tablet 10 mg, 10 mg, oral, Nightly, Chicho Avina MD, 10 mg at 08/26/24 0220    sodium bicarbonate tablet 650 mg, 650 mg, oral, BID, Hola Braun DO    tamsulosin (Flomax) 24 hr capsule 0.4 mg, 0.4 mg, oral, Nightly, Chicho Avina MD, 0.4 mg at 08/26/24 0220     LABS:  Lab Results   Component Value Date    WBC 3.3 (L) 08/26/2024    HGB 12.1 (L) 08/26/2024    HCT 39.5 (L) 08/26/2024     (H) 08/26/2024     08/26/2024      Results from last 72 hours   Lab Units 08/26/24  0827   SODIUM mmol/L 143   POTASSIUM mmol/L 4.6   CHLORIDE mmol/L 115*   CO2 mmol/L 15*   BUN mg/dL 71*   CREATININE mg/dL 4.94*   GLUCOSE mg/dL 179*   CALCIUM mg/dL 8.8   ANION GAP mmol/L 18   EGFR mL/min/1.73m*2 11*     Results from last 72 hours   Lab Units 08/26/24  0827   ALK PHOS U/L 80   BILIRUBIN TOTAL mg/dL 0.3   PROTEIN TOTAL g/dL 6.1*   ALT U/L 16   AST U/L 19   ALBUMIN g/dL 3.1*     Estimated Creatinine Clearance: 10.6 mL/min (A) (by C-G formula based on SCr of 4.94 mg/dL (H)).       IMAGING:  CXR 8/25  Impression:      Prominence of the interstitial markings may reflect pulmonary edema  or viral pneumonia. There are additional bibasilar consolidations  which are likely to represent atelectasis but superimposed infection  is difficult to exclude.    No pleural effusion or pneumothorax.       ASSESSMENT/PLAN:    COVID-19 Infection  Viral PNA - hypoxic and CXR with possible infiltrates  CKD - CrCl is 10    I discussed IV Remdesivir with patient explaining that it is FDA approved for patient's with CKD and ESRD but he did not want to take this with his history of CKD. I have ordered for PO Decadron 6mg/day for 9 more days (got a dose yesterday). I don't think there is need for abx, stopped those.    Will sign off. Please call back with questions. Thanks!    Gamaliel Romero MD  [facility]  Office #[phone]

## 2024-08-26 NOTE — PROGRESS NOTES
08/26/24 1034   [facility] Disability Status   Are you deaf or do you have serious difficulty hearing? N   Are you blind or do you have serious difficulty seeing, even when wearing glasses? N   Because of a physical, mental, or emotional condition, do you have serious difficulty concentrating, remembering, or making decisions? (5 years old or older) N   Do you have serious difficulty walking or climbing stairs? Y   Do you have serious difficulty dressing or bathing? N   Because of a physical, mental, or emotional condition, do you have serious difficulty doing errands alone such as visiting the doctor? N

## 2024-08-26 NOTE — PROGRESS NOTES
Pharmacy Medication History Review   Spoke to the patient wife. No med changes    Antonio Willis is a 79 y.o. male admitted for Oxygen deficiency. Pharmacy reviewed the patient's [id number] medications and allergies for accuracy.    The list below reflectives the updated PTA list. Please review each medication in order reconciliation for additional clarification and justification.     Prior to Admission Medications   Prescriptions Last Dose Informant   cholecalciferol (Vitamin D-3) 25 MCG (1000 UT) tablet     Sig: Take 1 tablet (25 mcg) by mouth once daily.   famotidine (Pepcid) 20 mg tablet     Sig: Take 1 tablet (20 mg) by mouth once daily at bedtime.   ferrous sulfate 325 (65 Fe) MG tablet     Sig: Iron 325 (65 Fe) MG Oral Tablet   Refills: 0        Start : 20-Jul-2022   Active   multivit-iron-minerals-folic acid (Centrum Silver) 0.4 mg-300 mcg- 250 mcg tab     Sig: Take 1 tablet by mouth once daily.   oxygen (O2) gas therapy     Sig: Inhale 2 L/min continuously.   rosuvastatin (Crestor) 10 mg tablet     Sig: Take 1 tablet (10 mg) by mouth once daily at bedtime.   tamsulosin (Flomax) 0.4 mg 24 hr capsule     Sig: Take 1 capsule (0.4 mg) by mouth once daily at bedtime.      Facility-Administered Medications: None         The list below reflectives the updated allergy list. Please review each documented allergy for additional clarification and justification.  Allergies  Reviewed by Charlene Caldwell RN on 8/25/2024   No Known Allergies         Below are additional concerns with the patient's PTA list.      Asia Willoughby

## 2024-08-27 LAB
ANION GAP SERPL CALC-SCNC: 16 MMOL/L (ref 10–20)
APPEARANCE UR: CLEAR
BILIRUB UR STRIP.AUTO-MCNC: NEGATIVE MG/DL
BUN SERPL-MCNC: 79 MG/DL (ref 6–23)
CALCIUM SERPL-MCNC: 8.6 MG/DL (ref 8.6–10.3)
CHLORIDE SERPL-SCNC: 116 MMOL/L (ref 98–107)
CO2 SERPL-SCNC: 15 MMOL/L (ref 21–32)
COLOR UR: ABNORMAL
CREAT SERPL-MCNC: 4.9 MG/DL (ref 0.5–1.3)
EGFRCR SERPLBLD CKD-EPI 2021: 11 ML/MIN/1.73M*2
ERYTHROCYTE [DISTWIDTH] IN BLOOD BY AUTOMATED COUNT: 13.9 % (ref 11.5–14.5)
GLUCOSE SERPL-MCNC: 152 MG/DL (ref 74–99)
GLUCOSE UR STRIP.AUTO-MCNC: ABNORMAL MG/DL
HCT VFR BLD AUTO: 41.1 % (ref 41–52)
HGB BLD-MCNC: 12.6 G/DL (ref 13.5–17.5)
HOLD SPECIMEN: NORMAL
KETONES UR STRIP.AUTO-MCNC: NEGATIVE MG/DL
LEUKOCYTE ESTERASE UR QL STRIP.AUTO: NEGATIVE
MCH RBC QN AUTO: 31 PG (ref 26–34)
MCHC RBC AUTO-ENTMCNC: 30.7 G/DL (ref 32–36)
MCV RBC AUTO: 101 FL (ref 80–100)
MUCOUS THREADS #/AREA URNS AUTO: NORMAL /LPF
NITRITE UR QL STRIP.AUTO: NEGATIVE
NRBC BLD-RTO: 0 /100 WBCS (ref 0–0)
PH UR STRIP.AUTO: 5.5 [PH]
PLATELET # BLD AUTO: 169 X10*3/UL (ref 150–450)
POTASSIUM SERPL-SCNC: 5.4 MMOL/L (ref 3.5–5.3)
PROT UR STRIP.AUTO-MCNC: ABNORMAL MG/DL
RBC # BLD AUTO: 4.07 X10*6/UL (ref 4.5–5.9)
RBC # UR STRIP.AUTO: ABNORMAL /UL
RBC #/AREA URNS AUTO: NORMAL /HPF
SODIUM SERPL-SCNC: 142 MMOL/L (ref 136–145)
SP GR UR STRIP.AUTO: 1.02
UROBILINOGEN UR STRIP.AUTO-MCNC: NORMAL MG/DL
WBC # BLD AUTO: 8.1 X10*3/UL (ref 4.4–11.3)
WBC #/AREA URNS AUTO: NORMAL /HPF

## 2024-08-27 PROCEDURE — 1200000002 HC GENERAL ROOM WITH TELEMETRY DAILY

## 2024-08-27 PROCEDURE — 85027 COMPLETE CBC AUTOMATED: CPT

## 2024-08-27 PROCEDURE — 87899 AGENT NOS ASSAY W/OPTIC: CPT | Mod: AHULAB | Performed by: INTERNAL MEDICINE

## 2024-08-27 PROCEDURE — 2500000004 HC RX 250 GENERAL PHARMACY W/ HCPCS (ALT 636 FOR OP/ED): Performed by: INTERNAL MEDICINE

## 2024-08-27 PROCEDURE — 81001 URINALYSIS AUTO W/SCOPE: CPT

## 2024-08-27 PROCEDURE — 2500000001 HC RX 250 WO HCPCS SELF ADMINISTERED DRUGS (ALT 637 FOR MEDICARE OP): Performed by: INTERNAL MEDICINE

## 2024-08-27 PROCEDURE — 2500000001 HC RX 250 WO HCPCS SELF ADMINISTERED DRUGS (ALT 637 FOR MEDICARE OP): Performed by: PHARMACIST

## 2024-08-27 PROCEDURE — 97161 PT EVAL LOW COMPLEX 20 MIN: CPT | Mod: GP

## 2024-08-27 PROCEDURE — 2500000002 HC RX 250 W HCPCS SELF ADMINISTERED DRUGS (ALT 637 FOR MEDICARE OP, ALT 636 FOR OP/ED): Performed by: INTERNAL MEDICINE

## 2024-08-27 PROCEDURE — 82374 ASSAY BLOOD CARBON DIOXIDE: CPT

## 2024-08-27 PROCEDURE — 97166 OT EVAL MOD COMPLEX 45 MIN: CPT | Mod: GO

## 2024-08-27 PROCEDURE — 99232 SBSQ HOSP IP/OBS MODERATE 35: CPT

## 2024-08-27 PROCEDURE — 99232 SBSQ HOSP IP/OBS MODERATE 35: CPT | Performed by: INTERNAL MEDICINE

## 2024-08-27 PROCEDURE — 87449 NOS EACH ORGANISM AG IA: CPT | Mod: AHULAB | Performed by: INTERNAL MEDICINE

## 2024-08-27 PROCEDURE — 36415 COLL VENOUS BLD VENIPUNCTURE: CPT

## 2024-08-27 RX ORDER — SODIUM BICARBONATE 650 MG/1
1300 TABLET ORAL 2 TIMES DAILY
Status: DISCONTINUED | OUTPATIENT
Start: 2024-08-27 | End: 2024-08-28 | Stop reason: HOSPADM

## 2024-08-27 ASSESSMENT — COGNITIVE AND FUNCTIONAL STATUS - GENERAL
DAILY ACTIVITIY SCORE: 16
HELP NEEDED FOR BATHING: A LOT
MOVING FROM LYING ON BACK TO SITTING ON SIDE OF FLAT BED WITH BEDRAILS: A LITTLE
DRESSING REGULAR UPPER BODY CLOTHING: A LITTLE
PERSONAL GROOMING: A LITTLE
CLIMB 3 TO 5 STEPS WITH RAILING: TOTAL
MOVING TO AND FROM BED TO CHAIR: A LOT
TOILETING: A LOT
TOILETING: A LOT
DAILY ACTIVITIY SCORE: 16
CLIMB 3 TO 5 STEPS WITH RAILING: TOTAL
MOBILITY SCORE: 14
HELP NEEDED FOR BATHING: A LOT
TURNING FROM BACK TO SIDE WHILE IN FLAT BAD: A LITTLE
MOVING TO AND FROM BED TO CHAIR: A LOT
PERSONAL GROOMING: A LITTLE
WALKING IN HOSPITAL ROOM: A LITTLE
STANDING UP FROM CHAIR USING ARMS: A LOT
DRESSING REGULAR UPPER BODY CLOTHING: A LITTLE
TURNING FROM BACK TO SIDE WHILE IN FLAT BAD: A LITTLE
DRESSING REGULAR LOWER BODY CLOTHING: A LOT
MOBILITY SCORE: 14
MOVING FROM LYING ON BACK TO SITTING ON SIDE OF FLAT BED WITH BEDRAILS: A LITTLE
STANDING UP FROM CHAIR USING ARMS: A LOT
DRESSING REGULAR LOWER BODY CLOTHING: A LOT
WALKING IN HOSPITAL ROOM: A LITTLE

## 2024-08-27 ASSESSMENT — ACTIVITIES OF DAILY LIVING (ADL)
BATHING_ASSISTANCE: MINIMAL
ADL_ASSISTANCE: INDEPENDENT
ADL_ASSISTANCE: INDEPENDENT

## 2024-08-27 ASSESSMENT — PAIN - FUNCTIONAL ASSESSMENT: PAIN_FUNCTIONAL_ASSESSMENT: 0-10

## 2024-08-27 ASSESSMENT — PAIN SCALES - GENERAL
PAINLEVEL_OUTOF10: 0 - NO PAIN

## 2024-08-27 NOTE — CARE PLAN
The patient's goals for the shift include      The clinical goals for the shift include maintain o2 levels above 92      Problem: Pain - Adult  Goal: Verbalizes/displays adequate comfort level or baseline comfort level  Outcome: Progressing     Problem: Safety - Adult  Goal: Free from fall injury  Outcome: Progressing     Problem: Discharge Planning  Goal: Discharge to home or other facility with appropriate resources  Outcome: Progressing     Problem: Chronic Conditions and Co-morbidities  Goal: Patient's chronic conditions and co-morbidity symptoms are monitored and maintained or improved  Outcome: Progressing

## 2024-08-27 NOTE — PROGRESS NOTES
Antonio Willis is a 79 y.o. male on day 2 of admission presenting with Oxygen deficiency.      Subjective   Seen and evaluated.   In covid isolation. Off supplemental O2 this morning.   LORE. He does not offer new complaints.        Objective        Vitals 24HR  Heart Rate:  [73-80]   Temp:  [36.3 °C (97.3 °F)-36.6 °C (97.9 °F)]   Resp:  [16-18]   BP: (125-130)/(69-75)   SpO2:  [95 %]     Intake/Output last 3 Shifts:    Intake/Output Summary (Last 24 hours) at 8/27/2024 1221  Last data filed at 8/27/2024 0700  Gross per 24 hour   Intake 548 ml   Output 400 ml   Net 148 ml       Physical Exam  Constitutional: Awake and alert. Non toxic.   Eyes: PERRL, EOMI, clear sclera   ENMT: mucous membranes moist, no apparent injury,  no lesions seen   Head/Neck: Neck supple, no apparent injury, No JVD, trachea midline   Respiratory/Thorax: Lungs are Clear to auscultation   Cardiovascular: Regular, rate and rhythm,  2+ equal  pulses of the extremities, normal S 1and S 2   Gastrointestinal: Nondistended, soft, non-tender, no rebound tenderness or guarding, +BS   Musculoskeletal: Moves all 4 extremities. No joint swelling.   Extremities: No edema.   Neurological: Awake and alert. No focal neurological deficits   Psychological: Appropriate mood and behavior   Skin: Warm and dry     Scheduled Medications  dexAMETHasone, 6 mg, oral, Daily  famotidine, 10 mg, oral, Nightly  ferrous sulfate (325 mg ferrous sulfate), 65 mg of iron, oral, Daily  heparin (porcine), 5,000 Units, subcutaneous, q8h  oxygen, , inhalation, Continuous - Inhalation  polyethylene glycol, 17 g, oral, Daily  rosuvastatin, 10 mg, oral, Nightly  sodium bicarbonate, 1,300 mg, oral, BID  tamsulosin, 0.4 mg, oral, Nightly      Continuous medications       PRN medications: acetaminophen **OR** acetaminophen **OR** acetaminophen, acetaminophen **OR** acetaminophen **OR** acetaminophen, melatonin, ondansetron **OR** ondansetron     Relevant Results  Results from last 7 days    Lab Units 08/27/24  0533 08/26/24  0827 08/25/24  1904   WBC AUTO x10*3/uL 8.1 3.3* 7.1   HEMOGLOBIN g/dL 12.6* 12.1* 14.3   HEMATOCRIT % 41.1 39.5* 44.9   PLATELETS AUTO x10*3/uL 169 151 177   NEUTROS PCT AUTO %  --   --  75.3   LYMPHS PCT AUTO %  --   --  12.3   MONOS PCT AUTO %  --   --  11.4   EOS PCT AUTO %  --   --  0.0     Results from last 7 days   Lab Units 08/27/24  0533 08/26/24  0827 08/25/24  1904   SODIUM mmol/L 142 143 146*   POTASSIUM mmol/L 5.4* 4.6 4.6   CHLORIDE mmol/L 116* 115* 115*   CO2 mmol/L 15* 15* 18*   BUN mg/dL 79* 71* 69*   CREATININE mg/dL 4.90* 4.94* 4.68*   GLUCOSE mg/dL 152* 179* 73*   CALCIUM mg/dL 8.6 8.8 9.9       XR chest 2 views   Final Result   Prominence of the interstitial markings may reflect pulmonary edema   or viral pneumonia. There are additional bibasilar consolidations   which are likely to represent atelectasis but superimposed infection   is difficult to exclude.        No pleural effusion or pneumothorax.        MACRO:   None        Signed by: Stew Prince 8/25/2024 7:27 PM   Dictation workstation:   [id number]               Assessment/Plan      Antonio Willis is a 79 y.o. male with a PMHx of recurrent nephrolithiasis, hyperlipidemia, prostate adenocarcinoma, multiple sclerosis, sinus node dysfunction status post dual chamber pacemaker placement, bifascicular block, history of GI bleed with gastric and duodenal ulcers, and stage G5 chronic kidney disease who is under the care of Dr. Higgins.  He was recently admitted in June and noted to have severe left hydronephrosis.  This was a new finding since late 2023.  There were nonobstructing left renal stones.  He received antimicrobial coverage.  Was seen by urology, they felt hydronephrosis was chronic with chronic pyuria.  No intervention was planned.  He comes in now with a productive cough, shortness of breath and generalized malaise.  He was found to be COVID-19 positive.  Chest plain film showed interstitial  markings concerning for edema versus viral pneumonia.  He was hemodynamically stable.  His creatinine was 4.68, BUN 69, GFR of 12, bicarbonate 18, sodium of 146.  He was placed on pneumonia antimicrobial coverage with Rocephin and azithromycin.  Decadron was ordered.  He is requiring 5 to 6 L/min nasal cannula.  5% dextrose and 0.45% saline was started. Nephrology is consulted for CKD stage V care.     Mr. Willis has advanced stage G5 chronic kidney disease with presumably solitary renal function having the left hydronephrosis.  His renal function is at or near his baseline.  Given his creatinine stability I will hold off on reimaging his kidneys in the setting of COVID 19 infection. I started him on an oral sodium bicarbonate supplement 650 mg twice daily. He remained on IV sodium chloride overnight which I will stop, he is now normotensive. NaCl administration led to a rising chloride and worsening acidosis. I will increase the bicarbonate supplement to 1300 mg BID for the time being. I will give him a dose of Lokelma 10 g x 1 to address the mild hyperkalemia. Otherwise, we are avoiding nephrotoxins. Trend his RFP.  Will follow with his care.        Principal Problem:    Oxygen deficiency      I spent 35 minutes in the professional and overall care of this patient.      Hola Braun, DO

## 2024-08-27 NOTE — NURSING NOTE
Urine collected pt refused education on collecting stated that he is not letting anyone stick anything anywhere. Refused nurse checking him stated that he knew if he had a bowel movement or not

## 2024-08-27 NOTE — PROGRESS NOTES
"Antonio Willis is a 79 y.o. male on day 2 of admission presenting with Oxygen deficiency.    Subjective   Patient seen and examined.  His breathing is better oxygen requirement has gone down and patient is currently on dexamethasone for COVID-19.  He has acute on chronic kidney injury and therefore no remdesivir was given to the patient because patient had declined to take remdesivir because of kidney disease.  He denies any chest discomfort or shortness of breath now.  And remains afebrile.         Objective     Physical Exam  GENERAL:  Alert, no distress, cooperative, currently on 3 L nasal cannula.  SKIN:  Skin color, texture, turgor normal. No rashes or lesions.  OROPHARYNX:  Lips, mucosa, and tongue are normal.Teeth and gums, normal. Oropharynx normal.  NECK:  No jugulovenous distention, No carotid bruits, Carotid pulse normal contour, Supple  LUNGS:  Lungs clear to auscultation. Good diaphragmatic excursion.  CARDIAC: Rate and rhythm is regular, normal S1 and S2; no rub, or gallops, 1/6 systolic ejection murmur left sternal border.  ABDOMEN:  Abdomen soft, non-tender, BS normal, No masses or organomegaly  EXTREMETIES:  Extremities normal, no deformities, edema, clubbing or skin discoloration. Good capillary refill., No ulcers  NEURO:  Alert, oriented X 3, Gait not examined. Non-focal. Reflexes normal and symmetric. Sensation grossly intact., Cranial nerves II-XII intact  PULSES:  2+ radial, 2+ carotid    Last Recorded Vitals  Blood pressure 130/75, pulse 80, temperature 36.6 °C (97.9 °F), temperature source Temporal, resp. rate 18, height 1.676 m (5' 6\"), weight 75.8 kg (167 lb), SpO2 95%.  Intake/Output last 3 Shifts:  I/O last 3 completed shifts:  In: 498 (6.6 mL/kg) [P.O.:248; IV Piggyback:250]  Out: 100 (1.3 mL/kg) [Urine:100 (0 mL/kg/hr)]  Weight: 75.7 kg     Relevant Results  Scheduled medications  dexAMETHasone, 6 mg, oral, Daily  famotidine, 10 mg, oral, Nightly  ferrous sulfate (325 mg ferrous " sulfate), 65 mg of iron, oral, Daily  heparin (porcine), 5,000 Units, subcutaneous, q8h  oxygen, , inhalation, Continuous - Inhalation  polyethylene glycol, 17 g, oral, Daily  rosuvastatin, 10 mg, oral, Nightly  sodium bicarbonate, 1,300 mg, oral, BID  tamsulosin, 0.4 mg, oral, Nightly      Continuous medications     PRN medications  PRN medications: acetaminophen **OR** acetaminophen **OR** acetaminophen, acetaminophen **OR** acetaminophen **OR** acetaminophen, melatonin, ondansetron **OR** ondansetron      Results for orders placed or performed during the hospital encounter of 08/25/24 (from the past 96 hour(s))   CBC and Auto Differential   Result Value Ref Range    WBC 7.1 4.4 - 11.3 x10*3/uL    nRBC 0.0 0.0 - 0.0 /100 WBCs    RBC 4.49 (L) 4.50 - 5.90 x10*6/uL    Hemoglobin 14.3 13.5 - 17.5 g/dL    Hematocrit 44.9 41.0 - 52.0 %     80 - 100 fL    MCH 31.8 26.0 - 34.0 pg    MCHC 31.8 (L) 32.0 - 36.0 g/dL    RDW 14.2 11.5 - 14.5 %    Platelets 177 150 - 450 x10*3/uL    Neutrophils % 75.3 40.0 - 80.0 %    Immature Granulocytes %, Automated 0.7 0.0 - 0.9 %    Lymphocytes % 12.3 13.0 - 44.0 %    Monocytes % 11.4 2.0 - 10.0 %    Eosinophils % 0.0 0.0 - 6.0 %    Basophils % 0.3 0.0 - 2.0 %    Neutrophils Absolute 5.37 1.60 - 5.50 x10*3/uL    Immature Granulocytes Absolute, Automated 0.05 0.00 - 0.50 x10*3/uL    Lymphocytes Absolute 0.88 0.80 - 3.00 x10*3/uL    Monocytes Absolute 0.81 (H) 0.05 - 0.80 x10*3/uL    Eosinophils Absolute 0.00 0.00 - 0.40 x10*3/uL    Basophils Absolute 0.02 0.00 - 0.10 x10*3/uL   Magnesium   Result Value Ref Range    Magnesium 2.40 1.60 - 2.40 mg/dL   Comprehensive metabolic panel   Result Value Ref Range    Glucose 73 (L) 74 - 99 mg/dL    Sodium 146 (H) 136 - 145 mmol/L    Potassium 4.6 3.5 - 5.3 mmol/L    Chloride 115 (H) 98 - 107 mmol/L    Bicarbonate 18 (L) 21 - 32 mmol/L    Anion Gap 18 10 - 20 mmol/L    Urea Nitrogen 69 (H) 6 - 23 mg/dL    Creatinine 4.68 (H) 0.50 - 1.30 mg/dL     eGFR 12 (L) >60 mL/min/1.73m*2    Calcium 9.9 8.6 - 10.3 mg/dL    Albumin 3.8 3.4 - 5.0 g/dL    Alkaline Phosphatase 106 33 - 136 U/L    Total Protein 7.6 6.4 - 8.2 g/dL    AST 30 9 - 39 U/L    Bilirubin, Total 0.5 0.0 - 1.2 mg/dL    ALT 19 10 - 52 U/L   B-Type Natriuretic Peptide   Result Value Ref Range    BNP 60 0 - 99 pg/mL   Blood Gas Venous Full Panel   Result Value Ref Range    POCT pH, Venous 7.27 (L) 7.33 - 7.43 pH    POCT pCO2, Venous 41 41 - 51 mm Hg    POCT pO2, Venous 23 (L) 35 - 45 mm Hg    POCT SO2, Venous 28 (L) 45 - 75 %    POCT Oxy Hemoglobin, Venous 27.5 (L) 45.0 - 75.0 %    POCT Hematocrit Calculated, Venous 42.0 41.0 - 52.0 %    POCT Sodium, Venous 144 136 - 145 mmol/L    POCT Potassium, Venous 4.8 3.5 - 5.3 mmol/L    POCT Chloride, Venous 115 (H) 98 - 107 mmol/L    POCT Ionized Calicum, Venous 1.30 1.10 - 1.33 mmol/L    POCT Glucose, Venous 90 74 - 99 mg/dL    POCT Lactate, Venous 1.6 0.4 - 2.0 mmol/L    POCT Base Excess, Venous -7.7 (L) -2.0 - 3.0 mmol/L    POCT HCO3 Calculated, Venous 18.8 (L) 22.0 - 26.0 mmol/L    POCT Hemoglobin, Venous 13.9 13.5 - 17.5 g/dL    POCT Anion Gap, Venous 15.0 10.0 - 25.0 mmol/L    Patient Temperature 37.0 degrees Celsius    FiO2 32 %   Blood Culture    Specimen: Peripheral Venipuncture; Blood culture   Result Value Ref Range    Blood Culture No growth at 1 day    Troponin I, High Sensitivity   Result Value Ref Range    Troponin I, High Sensitivity 21 (H) 0 - 20 ng/L   Blood Culture    Specimen: Peripheral Venipuncture; Blood culture   Result Value Ref Range    Blood Culture No growth at 1 day    Sars-CoV-2 PCR   Result Value Ref Range    Coronavirus 2019, PCR Detected (A) Not Detected   Influenza A, and B PCR   Result Value Ref Range    Flu A Result Not Detected Not Detected    Flu B Result Not Detected Not Detected   POCT GLUCOSE   Result Value Ref Range    POCT Glucose 129 (H) 74 - 99 mg/dL   CBC   Result Value Ref Range    WBC 3.3 (L) 4.4 - 11.3 x10*3/uL     nRBC 0.0 0.0 - 0.0 /100 WBCs    RBC 3.74 (L) 4.50 - 5.90 x10*6/uL    Hemoglobin 12.1 (L) 13.5 - 17.5 g/dL    Hematocrit 39.5 (L) 41.0 - 52.0 %     (H) 80 - 100 fL    MCH 32.4 26.0 - 34.0 pg    MCHC 30.6 (L) 32.0 - 36.0 g/dL    RDW 14.1 11.5 - 14.5 %    Platelets 151 150 - 450 x10*3/uL   Comprehensive metabolic panel   Result Value Ref Range    Glucose 179 (H) 74 - 99 mg/dL    Sodium 143 136 - 145 mmol/L    Potassium 4.6 3.5 - 5.3 mmol/L    Chloride 115 (H) 98 - 107 mmol/L    Bicarbonate 15 (L) 21 - 32 mmol/L    Anion Gap 18 10 - 20 mmol/L    Urea Nitrogen 71 (H) 6 - 23 mg/dL    Creatinine 4.94 (H) 0.50 - 1.30 mg/dL    eGFR 11 (L) >60 mL/min/1.73m*2    Calcium 8.8 8.6 - 10.3 mg/dL    Albumin 3.1 (L) 3.4 - 5.0 g/dL    Alkaline Phosphatase 80 33 - 136 U/L    Total Protein 6.1 (L) 6.4 - 8.2 g/dL    AST 19 9 - 39 U/L    Bilirubin, Total 0.3 0.0 - 1.2 mg/dL    ALT 16 10 - 52 U/L   CBC   Result Value Ref Range    WBC 8.1 4.4 - 11.3 x10*3/uL    nRBC 0.0 0.0 - 0.0 /100 WBCs    RBC 4.07 (L) 4.50 - 5.90 x10*6/uL    Hemoglobin 12.6 (L) 13.5 - 17.5 g/dL    Hematocrit 41.1 41.0 - 52.0 %     (H) 80 - 100 fL    MCH 31.0 26.0 - 34.0 pg    MCHC 30.7 (L) 32.0 - 36.0 g/dL    RDW 13.9 11.5 - 14.5 %    Platelets 169 150 - 450 x10*3/uL   Basic Metabolic Panel   Result Value Ref Range    Glucose 152 (H) 74 - 99 mg/dL    Sodium 142 136 - 145 mmol/L    Potassium 5.4 (H) 3.5 - 5.3 mmol/L    Chloride 116 (H) 98 - 107 mmol/L    Bicarbonate 15 (L) 21 - 32 mmol/L    Anion Gap 16 10 - 20 mmol/L    Urea Nitrogen 79 (H) 6 - 23 mg/dL    Creatinine 4.90 (H) 0.50 - 1.30 mg/dL    eGFR 11 (L) >60 mL/min/1.73m*2    Calcium 8.6 8.6 - 10.3 mg/dL   Urinalysis with Reflex Culture and Microscopic   Result Value Ref Range    Color, Urine Light-Yellow Light-Yellow, Yellow, Dark-Yellow    Appearance, Urine Clear Clear    Specific Gravity, Urine 1.023 1.005 - 1.035    pH, Urine 5.5 5.0, 5.5, 6.0, 6.5, 7.0, 7.5, 8.0    Protein, Urine 50 (1+) (A)  NEGATIVE, 10 (TRACE), 20 (TRACE) mg/dL    Glucose, Urine 150 (2+) (A) Normal mg/dL    Blood, Urine 0.03 (TRACE) (A) NEGATIVE    Ketones, Urine NEGATIVE NEGATIVE mg/dL    Bilirubin, Urine NEGATIVE NEGATIVE    Urobilinogen, Urine Normal Normal mg/dL    Nitrite, Urine NEGATIVE NEGATIVE    Leukocyte Esterase, Urine NEGATIVE NEGATIVE   Urinalysis Microscopic   Result Value Ref Range    WBC, Urine NONE 1-5, NONE /HPF    RBC, Urine NONE NONE, 1-2, 3-5 /HPF    Mucus, Urine FEW Reference range not established. /LPF    XR chest 2 views    Result Date: 8/25/2024  Interpreted By:  Stew Prince, STUDY: XR CHEST 2 VIEWS;  8/25/2024 6:43 pm   INDICATION: Signs/Symptoms:r/o PNA.   COMPARISON: CXR 12/21/2011   ACCESSION NUMBER(S): [id number]   ORDERING CLINICIAN: PARAM CAI   FINDINGS: Left chest wall pacemaker with leads in expected position.   CARDIOMEDIASTINAL SILHOUETTE: Cardiomediastinal silhouette is normal in size and configuration.   LUNGS: Prominence of the interstitial markings likely reflecting pulmonary edema. Bibasilar consolidations likely reflecting atelectasis.   ABDOMEN: No remarkable upper abdominal findings.   BONES: No acute osseous abnormality.       Prominence of the interstitial markings may reflect pulmonary edema or viral pneumonia. There are additional bibasilar consolidations which are likely to represent atelectasis but superimposed infection is difficult to exclude.   No pleural effusion or pneumothorax.   MACRO: None   Signed by: Stew Prince 8/25/2024 7:27 PM Dictation workstation:   [id number]               Assessment/Plan   Assessment & Plan  Oxygen deficiency  79-year-old gentleman was admitted with hypoxia and positive COVID-19.    1.  Possible viral pneumonia/COVID-19 pneumonia  Not a candidate for remdesivir because of chronic kidney disease and most however patient declined to get remdesivir therefore he has been started on dexamethasone.  Seen by infectious disease and antibiotics  were discontinued since there is no evidence of bacterial pneumonia.    2.  Acute on chronic kidney injury, stable monitor renal function closely.    3.  Hypoxia resolving currently on 3 L nasal cannula.    Reviewed all labs and imaging studies and discussed the plan of treatment with patient in detail.       I spent 35 minutes in the professional and overall care of this patient.      Rome Kerns MD

## 2024-08-27 NOTE — PROGRESS NOTES
8/27/2024 12;58 PM I spoke with patient's wife and discussed PT recommendations. I discussed that home care does not provide daily rehab services. Wife agrees to SNF. [facility] [place]. [facility]   Aggressive Histology Indication Override: longstanding duration

## 2024-08-27 NOTE — PROGRESS NOTES
Physical Therapy    Physical Therapy Evaluation    Patient Name: Antonio Willis  MRN: [id number]  Today's Date: 8/27/2024   Time Calculation  Start Time: 0947  Stop Time: 1014  Time Calculation (min): 27 min    Assessment/Plan   PT Assessment  PT Assessment Results: Decreased strength, Decreased endurance, Impaired balance, Decreased mobility  Rehab Prognosis: Good  Barriers to Discharge: Decreased endurance and increased assist required for short distance ambulation.  Evaluation/Treatment Tolerance: Patient limited by fatigue  Medical Staff Made Aware: Yes  Strengths: Ability to acquire knowledge, Access to adaptive/assistive products, Premorbid level of function, Support and attitude of living partners  Barriers to Participation: Comorbidities  End of Session Communication: Bedside nurse  Assessment Comment: Pt presents today with decreased BLE strength, balance, and endurance. Currently, pt is below the reported PLOF requiring min assist for bed mobility, mod assist for transfers, and min assist for short distance ambulation. Continued PT would benefit the pt to address the above factors and bring the pt closer to the PLOF while reducing fall risk.  End of Session Patient Position: Up in chair, Alarm on  IP OR SWING BED PT PLAN  Inpatient or Swing Bed: Inpatient  PT Plan  Treatment/Interventions: Bed mobility, Transfer training, Gait training, Balance training, Strengthening, Endurance training, Therapeutic exercise, Therapeutic activity, Home exercise program, Postural re-education  PT Plan: Ongoing PT  PT Frequency: 3 times per week  PT Discharge Recommendations: Moderate intensity level of continued care  Equipment Recommended upon Discharge: Wheeled walker  PT Recommended Transfer Status: Assist x1, Assistive device  PT - OK to Discharge: Yes (Per PT POC)    Subjective   General Visit Information:  General  Reason for Referral: 80 y/o M presenting for treatment of COVID pneumonia  Referred By: Kailyn  P  Past Medical History Relevant to Rehab: Cardiac pacemanker, CKD stage V, multiple sclerosis, HTN, HLD  Family/Caregiver Present: No  Co-Treatment: OT  Co-Treatment Reason: Co-evaluation with OT to maximize pt safety, transfer ability, and participation.  Prior to Session Communication: Bedside nurse  Patient Position Received: Bed, 3 rail up, Alarm on  Preferred Learning Style: auditory, kinesthetic, visual  General Comment: Pt supine in bed upon PT arrival. Cleared to participate with RN, and agreeable to co-evaluation with PT/OT.  Home Living:  Home Living  Type of Home: Apartment  Lives With: Spouse  Home Adaptive Equipment: Walker rolling or standard, Wheelchair-manual, Wheelchair-power (4-wheeled walker)  Home Layout: One level  Home Access: Elevator  Bathroom Shower/Tub: Walk-in shower  Bathroom Toilet: Standard  Bathroom Equipment: Grab bars in shower, Built-in shower seat  Prior Level of Function:  Prior Function Per Pt/Caregiver Report  Level of [place]: Independent with ADLs and functional transfers  Receives Help From: Family  ADL Assistance: Independent  Homemaking Assistance:  (Pt reports wife completes IADLs)  Ambulatory Assistance: Independent (Walker)  Vocational: Retired  Precautions:  Precautions  Medical Precautions: Fall precautions, Oxygen therapy device and L/min, Infection precautions (Droplet + precautions. 6L O2)    Objective   Pain:  Pain Assessment  Pain Assessment: 0-10  0-10 (Numeric) Pain Score: 0 - No pain  Cognition:  Cognition  Orientation Level:  (Oriented to person, place, and time)    General Assessments:  Activity Tolerance  Endurance: Tolerates 10 - 20 min exercise with multiple rests  Activity Tolerance Comments: Increased physical effort and work of breathing during transfers and short distance ambulation observed    Sensation  Sensation Comment: Appears intact    Coordination  Coordination Comment: Appears intact    Postural Control  Trunk Control: Mild trunk flexion  in standing with FWW support    Static Sitting Balance  Static Sitting-Balance Support: Bilateral upper extremity supported, Feet supported  Static Sitting-Level of Assistance: Contact guard  Dynamic Sitting Balance  Dynamic Sitting-Balance Support: Bilateral upper extremity supported (Single LE support during EOB BLE MMT)  Dynamic Sitting-Level of Assistance: Minimum assistance    Static Standing Balance  Static Standing-Balance Support: Bilateral upper extremity supported  Static Standing-Level of Assistance: Contact guard  Static Standing-Comment/Number of Minutes: WIth FWW support  Dynamic Standing Balance  Dynamic Standing-Balance Support: Bilateral upper extremity supported  Dynamic Standing-Level of Assistance: Minimum assistance  Dynamic Standing-Comments: With FWW support  Functional Assessments:  Bed Mobility  Bed Mobility: Yes  Bed Mobility 1  Bed Mobility 1: Supine to sitting  Level of Assistance 1: Moderate assistance  Bed Mobility Comments 1: HOB elevated. Assist provided for BLE progression off the EOB and during trunk raise into sitting. Pt able to reach and grab on bed rail to assist trunk with cues.    Transfers  Transfer: Yes  Transfer 1  Transfer From 1: Bed to  Transfer to 1: Stand  Technique 1: Sit to stand  Transfer Device 1: Walker, Gait belt  Transfer Level of Assistance 1: Moderate assistance, Minimal verbal cues  Trials/Comments 1: x 2 Trials. VC for BUE push from the bed instead of pulling to stand from the FWW. VC for hand placement on the walker in standing. Pt demonstrates mild trunk flexion with gaze directed at the ground  Transfers 2  Transfer From 2: Stand to  Transfer to 2: Bed  Technique 2: Stand to sit  Transfer Device 2: Walker, Gait belt  Transfer Level of Assistance 2: Minimum assistance, Minimal verbal cues  Trials/Comments 2: VC for BUE reach for the bed when sitting. Pt able to demontrate unilateral UE reach for the bed. Decreased eccentric control noted requiring assist  when sitting.  Transfers 3  Transfer From 3: Stand to  Transfer to 3: Chair with arms  Technique 3: Stand to sit  Transfer Device 3: Walker, Gait belt  Transfer Level of Assistance 3: Minimum assistance, Minimal verbal cues  Trials/Comments 3: VC for BLE positioning against the chair before attempting to sit. VC for BUE reach for the armrests of the chair. Pt able to demonstrate unilateral UE reach when sitting. Assist provided for controlled descent to the chair.    Ambulation/Gait Training  Ambulation/Gait Training Performed: Yes  Ambulation/Gait Training 1  Surface 1: Level tile  Device 1: Rolling walker  Gait Support Devices: Gait belt  Assistance 1: Minimum assistance, Minimal verbal cues  Quality of Gait 1: Decreased step length, Forward flexed posture  Comments/Distance (ft) 1: 4 steps right x 1, 3-4 steps from bed to chair. Decreased step length and debra observed. VC provided for intiating steps and sequencing withi walker progression. Assist provided for walker progression. Pt demontrates increased physical exertion and work of breathing during trial.    Stairs  Stairs: No  Extremity/Trunk Assessments:  RLE   RLE : Exceptions to WFL  Strength RLE  R Hip Flexion: 3+/5  R Knee Extension: 4-/5  R Ankle Dorsiflexion: 4-/5  R Ankle Plantar Flexion: 3+/5  LLE   LLE : Exceptions to WFL  Strength LLE  L Hip Flexion: 4-/5  L Knee Extension: 4-/5  L Ankle Dorsiflexion: 4-/5  L Ankle Plantar Flexion: 3+/5  Outcome Measures:  [facility] Basic Mobility  Turning from your back to your side while in a flat bed without using bedrails: A little  Moving from lying on your back to sitting on the side of a flat bed without using bedrails: A little  Moving to and from bed to chair (including a wheelchair): A lot  Standing up from a chair using your arms (e.g. wheelchair or bedside chair): A lot  To walk in hospital room: A little  Climbing 3-5 steps with railing: Total  Basic Mobility - Total Score: 14    Encounter Problems        Encounter Problems (Active)       Balance       Goal 1 (Progressing)       Start:  08/27/24    Expected End:  09/10/24       Pt performs all sitting balance with supervision and standing balance with CGA using FWW            Mobility       STG - Patient will ambulate (Progressing)       Start:  08/27/24    Expected End:  09/10/24       35 ft with CGA using FWW            PT Transfers       STG - Patient to transfer to and from sit to supine (Progressing)       Start:  08/27/24    Expected End:  09/10/24       IND         STG - Patient will transfer sit to and from stand (Progressing)       Start:  08/27/24    Expected End:  09/10/24       With CGA using FWW              Education Documentation  Body Mechanics, taught by Thee Hong PT at 8/27/2024 11:16 AM.  Learner: Patient  Readiness: Acceptance  Method: Explanation, Demonstration  Response: Verbalizes Understanding    Mobility Training, taught by Thee Hong, PT at 8/27/2024 11:16 AM.  Learner: Patient  Readiness: Acceptance  Method: Explanation, Demonstration  Response: Verbalizes Understanding    Education Comments  No comments found.

## 2024-08-27 NOTE — PROGRESS NOTES
Antonio Willis is a 79 y.o. male on day 2 of admission presenting with Oxygen deficiency.      Subjective   Saw and examined patient. Patient alert, NAD. Symptoms improving, still has a mild cough. No dyspnea at rest. Looking forward to being discharged. On 6L NC.           Objective     Last Recorded Vitals  /76 (BP Location: Left arm, Patient Position: Lying)   Pulse 84   Temp 36.4 °C (97.5 °F) (Temporal)   Resp 16   Wt 75.8 kg (167 lb)   SpO2 97%   Intake/Output last 3 Shifts:    Intake/Output Summary (Last 24 hours) at 8/27/2024 1711  Last data filed at 8/27/2024 0700  Gross per 24 hour   Intake 548 ml   Output 400 ml   Net 148 ml       Admission Weight  Weight: 75.8 kg (167 lb) (08/25/24 1800)    Daily Weight  08/25/24 : 75.8 kg (167 lb)    Image Results  XR chest 2 views  Narrative: Interpreted By:  Stew Prince,   STUDY:  XR CHEST 2 VIEWS;  8/25/2024 6:43 pm      INDICATION:  Signs/Symptoms:r/o PNA.      COMPARISON:  CXR 12/21/2011      ACCESSION NUMBER(S):  [id number]      ORDERING CLINICIAN:  PARAM CAI      FINDINGS:  Left chest wall pacemaker with leads in expected position.      CARDIOMEDIASTINAL SILHOUETTE:  Cardiomediastinal silhouette is normal in size and configuration.      LUNGS:  Prominence of the interstitial markings likely reflecting pulmonary  edema. Bibasilar consolidations likely reflecting atelectasis.      ABDOMEN:  No remarkable upper abdominal findings.      BONES:  No acute osseous abnormality.      Impression: Prominence of the interstitial markings may reflect pulmonary edema  or viral pneumonia. There are additional bibasilar consolidations  which are likely to represent atelectasis but superimposed infection  is difficult to exclude.      No pleural effusion or pneumothorax.      MACRO:  None      Signed by: Stew Prince 8/25/2024 7:27 PM  Dictation workstation:   [id number]      Physical Exam    Constitutional: NAD  Eyes: no icterus   ENMT: mucous membranes  moist  Head/Neck: supple  Resp/thorax: CTA bilat, mild cough, on 6L NC  C/V: RRR, no murmurs  : no Dow[place]  GI: S/ND/NT, + BS  M/S: no joint swelling  Extremities: no edema  Neurological: non-focal  Skin: Warm and dry      Relevant Results  Scheduled medications  dexAMETHasone, 6 mg, oral, Daily  famotidine, 10 mg, oral, Nightly  ferrous sulfate (325 mg ferrous sulfate), 65 mg of iron, oral, Daily  heparin (porcine), 5,000 Units, subcutaneous, q8h  oxygen, , inhalation, Continuous - Inhalation  polyethylene glycol, 17 g, oral, Daily  rosuvastatin, 10 mg, oral, Nightly  sodium bicarbonate, 1,300 mg, oral, BID  tamsulosin, 0.4 mg, oral, Nightly      Continuous medications     PRN medications  PRN medications: acetaminophen **OR** acetaminophen **OR** acetaminophen, acetaminophen **OR** acetaminophen **OR** acetaminophen, melatonin, ondansetron **OR** ondansetron  Results for orders placed or performed during the hospital encounter of 08/25/24 (from the past 24 hour(s))   CBC   Result Value Ref Range    WBC 8.1 4.4 - 11.3 x10*3/uL    nRBC 0.0 0.0 - 0.0 /100 WBCs    RBC 4.07 (L) 4.50 - 5.90 x10*6/uL    Hemoglobin 12.6 (L) 13.5 - 17.5 g/dL    Hematocrit 41.1 41.0 - 52.0 %     (H) 80 - 100 fL    MCH 31.0 26.0 - 34.0 pg    MCHC 30.7 (L) 32.0 - 36.0 g/dL    RDW 13.9 11.5 - 14.5 %    Platelets 169 150 - 450 x10*3/uL   Basic Metabolic Panel   Result Value Ref Range    Glucose 152 (H) 74 - 99 mg/dL    Sodium 142 136 - 145 mmol/L    Potassium 5.4 (H) 3.5 - 5.3 mmol/L    Chloride 116 (H) 98 - 107 mmol/L    Bicarbonate 15 (L) 21 - 32 mmol/L    Anion Gap 16 10 - 20 mmol/L    Urea Nitrogen 79 (H) 6 - 23 mg/dL    Creatinine 4.90 (H) 0.50 - 1.30 mg/dL    eGFR 11 (L) >60 mL/min/1.73m*2    Calcium 8.6 8.6 - 10.3 mg/dL   Urinalysis with Reflex Culture and Microscopic   Result Value Ref Range    Color, Urine Light-Yellow Light-Yellow, Yellow, Dark-Yellow    Appearance, Urine Clear Clear    Specific Gravity, Urine 1.023 1.005 -  1.035    pH, Urine 5.5 5.0, 5.5, 6.0, 6.5, 7.0, 7.5, 8.0    Protein, Urine 50 (1+) (A) NEGATIVE, 10 (TRACE), 20 (TRACE) mg/dL    Glucose, Urine 150 (2+) (A) Normal mg/dL    Blood, Urine 0.03 (TRACE) (A) NEGATIVE    Ketones, Urine NEGATIVE NEGATIVE mg/dL    Bilirubin, Urine NEGATIVE NEGATIVE    Urobilinogen, Urine Normal Normal mg/dL    Nitrite, Urine NEGATIVE NEGATIVE    Leukocyte Esterase, Urine NEGATIVE NEGATIVE   Urinalysis Microscopic   Result Value Ref Range    WBC, Urine NONE 1-5, NONE /HPF    RBC, Urine NONE NONE, 1-2, 3-5 /HPF    Mucus, Urine FEW Reference range not established. /LPF   Extra Urine Gray Tube   Result Value Ref Range    Extra Tube Hold for add-ons.          Assessment/Plan      79 y.o. male with a PMH of stage 5 CKD, recurrent nephrolithiasis, HLD, prostate cancer, MS, GERD, SA node dysfunction s/p pacemaker presents with weakness and shortness of breath for 1 week. Patient tested positive for COVID 19. CXR shows viral pneumonia vs edema. UA pos for glucosuria and proteinuria.    Viral Pneumonia  COVID-19  -afebrile, no leukocytosis, blood culture ng after 2d  -ID following  -IV abx stopped  -PO decadron day 3 of 10  -on 6L NC (none at home)  -isolation precautions  -wean O2 as able    CKD Stage 5  Left Kidney Hydronephrosis  -nephrology following  -K 5.4  -lokelma x1  -Cr 4.9, stable (at baseline)   -continue sodium bicarb 1.3g daily    GERD  -continue Pepsid    Anemia of Chronic Disease  -Hgb   -continue PO iron    SA Node Dysfunction s/p Pacemaker  HLD  -on tele  -continue statin    BPH  -flowmax    DVT prophylaxis:  -Heparin, SCD    DC plan:  -Auth pending for SNF    Discussed with Dr. Saumya Christina PA-C

## 2024-08-27 NOTE — PROGRESS NOTES
08/27/24 1440   Discharge Planning   Expected Discharge Disposition SNF     TCC spoke with pt wife. SRINIVAS Marks at the [facility]. Requested to start auth.

## 2024-08-27 NOTE — PROGRESS NOTES
8/27/2024 1:35 PM Message left for Admissions at [facility] at the [facility] to see if they can accept patient. Qiana CORDERO

## 2024-08-27 NOTE — ASSESSMENT & PLAN NOTE
79-year-old gentleman was admitted with hypoxia and positive COVID-19.    1.  Possible viral pneumonia/COVID-19 pneumonia  Not a candidate for remdesivir because of chronic kidney disease and most however patient declined to get remdesivir therefore he has been started on dexamethasone.  Seen by infectious disease and antibiotics were discontinued since there is no evidence of bacterial pneumonia.    2.  Acute on chronic kidney injury, stable monitor renal function closely.    3.  Hypoxia resolving currently on 3 L nasal cannula.    Reviewed all labs and imaging studies and discussed the plan of treatment with patient in detail.

## 2024-08-27 NOTE — CARE PLAN
The patient's goals for the shift include      The clinical goals for the shift include maintain o2 levels above 92      Problem: Safety - Adult  Goal: Free from fall injury  8/27/2024 0654 by Michelle Taylor RN  Outcome: Progressing  8/27/2024 0654 by Michelle Taylor RN  Outcome: Progressing     Problem: Skin  Goal: Decreased wound size/increased tissue granulation at next dressing change  8/27/2024 0654 by Michelle Taylor RN  Outcome: Progressing  8/27/2024 0654 by Michelle Taylor RN  Outcome: Progressing  Goal: Participates in plan/prevention/treatment measures  8/27/2024 0654 by Michelle Taylor RN  Outcome: Progressing  8/27/2024 0654 by Michelle Taylor RN  Outcome: Progressing  Goal: Prevent/manage excess moisture  8/27/2024 0654 by Michelle Taylor RN  Outcome: Progressing  8/27/2024 0654 by Michelle Taylor RN  Outcome: Progressing  Goal: Prevent/minimize sheer/friction injuries  8/27/2024 0654 by Michelle Taylor RN  Outcome: Progressing  8/27/2024 0654 by Michelle Taylor RN  Outcome: Progressing  Goal: Promote/optimize nutrition  8/27/2024 0654 by Michelle Taylor RN  Outcome: Progressing  8/27/2024 0654 by Michelle Taylor RN  Outcome: Progressing  Goal: Promote skin healing  8/27/2024 0654 by Michelle Taylor RN  Outcome: Progressing  8/27/2024 0654 by Michelle Taylor RN  Outcome: Progressing

## 2024-08-27 NOTE — PROGRESS NOTES
Occupational Therapy    Evaluation    Patient Name: Antonio Willis  MRN: [id number]  Today's Date: 8/27/2024  Time Calculation  Start Time: 1144  Stop Time: 1212  Time Calculation (min): 28 min        Assessment:  OT Assessment: Pt presents below baseline in self care, fxnl transfers and fxnl mob. He has poor deficit awareness and presents with agitation regarding hospital policies such as bed alarm, not getting up alone, etc. He tolerates OT eval fairly but with agitation intermittently and inappropriate and gruff comments. OT is not recommending home d/c due to need for assist with all tasks and impaired activity tolerance. Pt requies skilled therapy to return home safely. Recommending mod intensity OT at d/c at this time.  Prognosis: Good  Barriers to Discharge: None  Evaluation/Treatment Tolerance: Patient limited by fatigue  Medical Staff Made Aware: Yes  End of Session Communication: Bedside nurse  End of Session Patient Position: Up in chair, Alarm on  OT Assessment Results: Decreased ADL status, Decreased safe judgment during ADL, Decreased upper extremity strength, Decreased endurance, Decreased functional mobility, Decreased fine motor control  Prognosis: Good  Barriers to Discharge: None  Evaluation/Treatment Tolerance: Patient limited by fatigue  Medical Staff Made Aware: Yes  Strengths: Housing layout, Premorbid level of function  Barriers to Participation: Comorbidities, Coping skills, Insight into problems  Plan:  Treatment Interventions: ADL retraining, Functional transfer training, UE strengthening/ROM, Endurance training, Patient/family training, Equipment evaluation/education, Neuromuscular reeducation, Fine motor coordination activities  OT Frequency: 3 times per week  OT Discharge Recommendations: Moderate intensity level of continued care  Equipment Recommended upon Discharge: Wheeled walker  OT Recommended Transfer Status: Moderate assist  OT - OK to Discharge: Yes  Treatment Interventions: ADL  retraining, Functional transfer training, UE strengthening/ROM, Endurance training, Patient/family training, Equipment evaluation/education, Neuromuscular reeducation, Fine motor coordination activities    Subjective   Current Problem:  1. Oxygen deficiency        2. Pneumonia of right lung due to infectious organism, unspecified part of lung          General:  General  Reason for Referral: 78 y/o M presenting for treatment of COVID pneumonia  Referred By: BRIAN Avina  Past Medical History Relevant to Rehab: Cardiac pacemanker, CKD stage V, multiple sclerosis, HTN, HLD  Family/Caregiver Present: No  Co-Treatment: PT  Co-Treatment Reason: Co-evaluation with OT to maximize pt safety, transfer ability, and participation.  Prior to Session Communication: Bedside nurse  Patient Position Received: Bed, 3 rail up, Alarm on  Preferred Learning Style: auditory, kinesthetic, visual  General Comment: Pt supine in bed upon PT arrival. Cleared to participate with RN, and agreeable to co-evaluation with PT/OT.  Precautions:  Medical Precautions: Fall precautions, Oxygen therapy device and L/min, Infection precautions (6 L O2)  Pain:  Pain Assessment  0-10 (Numeric) Pain Score: 0 - No pain    Objective   Cognition:  Overall Cognitive Status: Within Functional Limits  Orientation Level: Disoriented X4     Home Living:  Type of Home: Apartment  Lives With: Spouse  Home Adaptive Equipment: Walker rolling or standard, Wheelchair-manual, Wheelchair-power (4 wheeled walker)  Home Layout: One level  Home Access: Elevator  Bathroom Shower/Tub: Walk-in shower  Bathroom Toilet: Standard  Bathroom Equipment: Grab bars in shower, Built-in shower seat  Prior Function:  Level of [place]: Independent with ADLs and functional transfers  Receives Help From: Family  ADL Assistance: Independent  Homemaking Assistance:  (spouse completes)  Ambulatory Assistance: Independent (walker primarily)  Vocational: Retired  ADL:  Eating Assistance:  Independent  Grooming Assistance: Stand by  Grooming Deficit: Setup, Supervision/safety  Bathing Assistance: Minimal  Bathing Deficit: Right lower leg including foot, Left lower leg including foot  UE Dressing Assistance: Stand by  UE Dressing Deficit: Setup  LE Dressing Assistance: Moderate  LE Dressing Deficit: Thread LLE into underwear, Thread RLE into underwear, Thread LLE into pants, Thread RLE into pants, Don/doff R sock, Don/doff L sock  Toileting Assistance with Device: Minimal  Toileting Deficit:  (male joel)  ADL Comments: pt limited by fatigue, weakness and dyspnea on exertion  Activity Tolerance:  Endurance: Tolerates 10 - 20 min exercise with multiple rests  Activity Tolerance Comments: dyspnea on exertion, was not wearing O2 upon therapist arrival, unable to obtain vitals without O2 as pt is in isolation room and no vitals cart was present. On 6 L O2 is 95%. Pt is quick to fatigue and has a limited standing tolerance. He required education on importance of OOB activity and being up in chair  to improve lung function.  Bed Mobility/Transfers: Bed Mobility 1  Bed Mobility 1: Supine to sitting  Level of Assistance 1: Moderate assistance  Bed Mobility Comments 1: HOB elevated, assist for BLE off bed, assist for trunk.    Transfer 1  Transfer From 1: Bed to  Transfer to 1: Stand  Technique 1: Sit to stand  Transfer Device 1: Walker, Gait belt  Transfer Level of Assistance 1: Moderate assistance, Minimal verbal cues  Trials/Comments 1: x2 trials, cues for hand placement. cues for upright posture and cervical extension  Transfers 3  Transfer From 3: Stand to  Transfer to 3: Chair with arms  Technique 3: Stand pivot  Transfer Device 3: Walker, Gait belt  Transfer Level of Assistance 3: Minimum assistance, Minimal verbal cues  Trials/Comments 3: assist to maneuver RW, cues for eccentric control and safety.    Sitting Balance:  Static Sitting Balance  Static Sitting-Balance Support: Bilateral upper extremity  supported, Feet supported  Static Sitting-Level of Assistance: Contact guard  Dynamic Sitting Balance  Dynamic Sitting-Balance Support: Bilateral upper extremity supported, Feet supported  Dynamic Sitting-Level of Assistance: Minimum assistance  Dynamic Sitting-Balance: Trunk control activities  Dynamic Sitting-Comments: posterior LOB  Standing Balance:  Static Standing Balance  Static Standing-Balance Support: Bilateral upper extremity supported  Static Standing-Level of Assistance: Contact guard  Dynamic Standing Balance  Dynamic Standing-Balance Support: Bilateral upper extremity supported  Dynamic Standing-Level of Assistance: Minimum assistance  Dynamic Standing-Balance: Turning     Coordination:  Movements are Fluid and Coordinated: No  Upper Body Coordination: impaired FMC, tremors  Coordination Comment: unable to fully asses UB coordination due to patient's affect, slightly inappropriate and agitated, does not tolerate increased questions/ testing.   Hand Function:  Gross Grasp: Functional  Coordination: Functional (impaired at baseline with MS dx but appears to be functional for feeding/ grooming, transfers)  Extremities: RUE   RUE : Within Functional Limits (3+/5 grossly) and LUE   LUE: Within Functional Limits (4-/5 grossly)    Outcome Measures:[facility] Daily Activity  Putting on and taking off regular lower body clothing: A lot  Bathing (including washing, rinsing, drying): A lot  Putting on and taking off regular upper body clothing: A little  Toileting, which includes using toilet, bedpan or urinal: A lot  Taking care of personal grooming such as brushing teeth: A little  Eating Meals: None  Daily Activity - Total Score: 16      Education Documentation  Body Mechanics, taught by Carie Steele OT at 8/27/2024 12:20 PM.  Learner: Patient  Readiness: Acceptance  Method: Explanation  Response: Verbalizes Understanding    Precautions, taught by Carie Steele OT at 8/27/2024 12:20 PM.  Learner:  Patient  Readiness: Acceptance  Method: Explanation  Response: Verbalizes Understanding    Education Comments  No comments found.      Goals:  Encounter Problems       Encounter Problems (Active)       ADLs       Patient with complete upper body dressing with modified independent level of assistance donning and doffing all UE clothes with PRN adaptive equipment while edge of bed        Start:  08/27/24    Expected End:  09/10/24            Patient with complete lower body dressing with contact guard assist level of assistance donning and doffing all LE clothes  with PRN adaptive equipment while edge of bed        Start:  08/27/24    Expected End:  09/10/24            Patient will complete daily grooming tasks brushing teeth and washing face/hair with stand by assist level of assistance and PRN adaptive equipment while standing sinkside.       Start:  08/27/24    Expected End:  09/10/24            Patient will complete toileting including hygiene clothing management/hygiene with contact guard assist level of assistance and grab bars.       Start:  08/27/24    Expected End:  09/10/24               BALANCE       Patient will tolerate standing for 10 minutes to stand by assist level of assistance with least restrictive device in order to improve functional activity tolerance for ADL tasks.       Start:  08/27/24    Expected End:  09/10/24               TRANSFERS       Patient will perform bed mobility modified independent level of assistance and bed rails in order to improve safety and independence with mobility       Start:  08/27/24    Expected End:  09/10/24            Patient will complete functional transfer to chair with arms with least restrictive device with stand by assist level of assistance.       Start:  08/27/24    Expected End:  09/10/24

## 2024-08-28 VITALS
DIASTOLIC BLOOD PRESSURE: 78 MMHG | BODY MASS INDEX: 26.84 KG/M2 | RESPIRATION RATE: 16 BRPM | SYSTOLIC BLOOD PRESSURE: 151 MMHG | OXYGEN SATURATION: 95 % | WEIGHT: 167 LBS | HEIGHT: 66 IN | TEMPERATURE: 97.6 F | HEART RATE: 69 BPM

## 2024-08-28 PROBLEM — R09.02 OXYGEN DEFICIENCY: Status: RESOLVED | Noted: 2024-08-25 | Resolved: 2024-08-28

## 2024-08-28 LAB
ANION GAP SERPL CALC-SCNC: 15 MMOL/L (ref 10–20)
BUN SERPL-MCNC: 83 MG/DL (ref 6–23)
CALCIUM SERPL-MCNC: 8.5 MG/DL (ref 8.6–10.3)
CHLORIDE SERPL-SCNC: 118 MMOL/L (ref 98–107)
CO2 SERPL-SCNC: 16 MMOL/L (ref 21–32)
CREAT SERPL-MCNC: 4.34 MG/DL (ref 0.5–1.3)
EGFRCR SERPLBLD CKD-EPI 2021: 13 ML/MIN/1.73M*2
ERYTHROCYTE [DISTWIDTH] IN BLOOD BY AUTOMATED COUNT: 13.7 % (ref 11.5–14.5)
GLUCOSE SERPL-MCNC: 99 MG/DL (ref 74–99)
HCT VFR BLD AUTO: 40.3 % (ref 41–52)
HGB BLD-MCNC: 13.1 G/DL (ref 13.5–17.5)
LEGIONELLA AG UR QL: NEGATIVE
MCH RBC QN AUTO: 31.8 PG (ref 26–34)
MCHC RBC AUTO-ENTMCNC: 32.5 G/DL (ref 32–36)
MCV RBC AUTO: 98 FL (ref 80–100)
NRBC BLD-RTO: 0 /100 WBCS (ref 0–0)
PLATELET # BLD AUTO: 185 X10*3/UL (ref 150–450)
POTASSIUM SERPL-SCNC: 5.3 MMOL/L (ref 3.5–5.3)
RBC # BLD AUTO: 4.12 X10*6/UL (ref 4.5–5.9)
S PNEUM AG UR QL: NEGATIVE
SODIUM SERPL-SCNC: 144 MMOL/L (ref 136–145)
WBC # BLD AUTO: 9.8 X10*3/UL (ref 4.4–11.3)

## 2024-08-28 PROCEDURE — 85027 COMPLETE CBC AUTOMATED: CPT

## 2024-08-28 PROCEDURE — 36415 COLL VENOUS BLD VENIPUNCTURE: CPT

## 2024-08-28 PROCEDURE — 2500000001 HC RX 250 WO HCPCS SELF ADMINISTERED DRUGS (ALT 637 FOR MEDICARE OP): Performed by: INTERNAL MEDICINE

## 2024-08-28 PROCEDURE — 82374 ASSAY BLOOD CARBON DIOXIDE: CPT

## 2024-08-28 PROCEDURE — 2500000004 HC RX 250 GENERAL PHARMACY W/ HCPCS (ALT 636 FOR OP/ED): Performed by: INTERNAL MEDICINE

## 2024-08-28 PROCEDURE — 99239 HOSP IP/OBS DSCHRG MGMT >30: CPT | Performed by: INTERNAL MEDICINE

## 2024-08-28 RX ORDER — DEXAMETHASONE 6 MG/1
6 TABLET ORAL DAILY
Start: 2024-08-29 | End: 2024-09-04

## 2024-08-28 RX ORDER — SODIUM BICARBONATE 650 MG/1
1300 TABLET ORAL 2 TIMES DAILY
Qty: 4 TABLET | Refills: 2 | Status: SHIPPED | OUTPATIENT
Start: 2024-08-28 | End: 2024-08-31

## 2024-08-28 ASSESSMENT — COGNITIVE AND FUNCTIONAL STATUS - GENERAL
WALKING IN HOSPITAL ROOM: A LOT
MOBILITY SCORE: 13
TURNING FROM BACK TO SIDE WHILE IN FLAT BAD: A LITTLE
MOVING TO AND FROM BED TO CHAIR: A LOT
STANDING UP FROM CHAIR USING ARMS: A LOT
TOILETING: A LOT
DRESSING REGULAR UPPER BODY CLOTHING: A LITTLE
DRESSING REGULAR LOWER BODY CLOTHING: A LOT
MOVING FROM LYING ON BACK TO SITTING ON SIDE OF FLAT BED WITH BEDRAILS: A LITTLE
PERSONAL GROOMING: A LITTLE
HELP NEEDED FOR BATHING: A LOT
DAILY ACTIVITIY SCORE: 16
CLIMB 3 TO 5 STEPS WITH RAILING: TOTAL

## 2024-08-28 ASSESSMENT — PAIN SCALES - GENERAL
PAINLEVEL_OUTOF10: 0 - NO PAIN
PAINLEVEL_OUTOF10: 0 - NO PAIN

## 2024-08-28 ASSESSMENT — PAIN - FUNCTIONAL ASSESSMENT: PAIN_FUNCTIONAL_ASSESSMENT: 0-10

## 2024-08-28 NOTE — CARE PLAN
The patient's goals for the shift include      The clinical goals for the shift include Maintain 02 levels of 95%      Problem: Safety - Adult  Goal: Free from fall injury  8/28/2024 0523 by Michelle Taylor RN  Outcome: Progressing  8/28/2024 0521 by Michelle Taylor RN  Outcome: Progressing     Problem: Chronic Conditions and Co-morbidities  Goal: Patient's chronic conditions and co-morbidity symptoms are monitored and maintained or improved  8/28/2024 0523 by Michelle Taylor RN  Outcome: Progressing  8/28/2024 0521 by Michelle Taylor RN  Outcome: Progressing

## 2024-08-28 NOTE — CARE PLAN
The patient's goals for the shift include      The clinical goals for the shift include Maintain 02 levels of 95%    Over the shift, the patient is making  Problem: Pain - Adult  Goal: Verbalizes/displays adequate comfort level or baseline comfort level  Outcome: Progressing     Problem: Safety - Adult  Goal: Free from fall injury  Outcome: Progressing     Problem: Skin  Goal: Prevent/minimize sheer/friction injuries  Outcome: Progressing     Problem: Fall/Injury  Goal: Be free from injury by end of the shift  Outcome: Progressing    progress toward the following goals.

## 2024-08-28 NOTE — DISCHARGE SUMMARY
ADMISSION DATE: 8/25/2024     DISCHARGE DATE:  08/28/24     Discharge Diagnosis  Oxygen deficiency, hypoxia resolving  COVID-19 pneumonia stable  Hypertension  Chronic smoking  Chronic kidney disease  Previous history of nephrolithiasis and left hydronephrosis.    Issues Requiring Follow-Up  Discharge to skilled nursing facility.        Discharge Meds     Your medication list        START taking these medications        Instructions Last Dose Given Next Dose Due   dexAMETHasone 6 mg tablet  Commonly known as: Decadron  Start taking on: August 29, 2024      Take 1 tablet (6 mg) by mouth once daily for 6 doses.       sodium bicarbonate 650 mg tablet      Take 2 tablets (1,300 mg) by mouth 2 times a day for 3 days. Check BMP at facility.              CONTINUE taking these medications        Instructions Last Dose Given Next Dose Due   Centrum Silver 0.4 mg-300 mcg- 250 mcg  Generic drug: multivitamin with minerals iron-free           cholecalciferol 25 MCG (1000 UT) tablet  Commonly known as: Vitamin D-3           famotidine 20 mg tablet  Commonly known as: Pepcid           ferrous sulfate (325 mg ferrous sulfate) tablet           oxygen gas therapy  Commonly known as: O2      Inhale 2 L/min continuously.       rosuvastatin 10 mg tablet  Commonly known as: Crestor      Take 1 tablet (10 mg) by mouth once daily at bedtime.       tamsulosin 0.4 mg 24 hr capsule  Commonly known as: Flomax                     Where to Get Your Medications        [facility] AT Portal to Registered [facility], [place] [address]      Phone: [phone]   sodium bicarbonate 650 mg tablet       Information about where to get these medications is not yet available    Ask your nurse or doctor about these medications  dexAMETHasone 6 mg tablet             Results for orders placed or performed during the hospital  encounter of 08/25/24 (from the past 24 hour(s))   CBC   Result Value Ref Range    WBC 9.8 4.4 - 11.3 x10*3/uL    nRBC 0.0 0.0 - 0.0 /100 WBCs    RBC 4.12 (L) 4.50 - 5.90 x10*6/uL    Hemoglobin 13.1 (L) 13.5 - 17.5 g/dL    Hematocrit 40.3 (L) 41.0 - 52.0 %    MCV 98 80 - 100 fL    MCH 31.8 26.0 - 34.0 pg    MCHC 32.5 32.0 - 36.0 g/dL    RDW 13.7 11.5 - 14.5 %    Platelets 185 150 - 450 x10*3/uL   Basic Metabolic Panel   Result Value Ref Range    Glucose 99 74 - 99 mg/dL    Sodium 144 136 - 145 mmol/L    Potassium 5.3 3.5 - 5.3 mmol/L    Chloride 118 (H) 98 - 107 mmol/L    Bicarbonate 16 (L) 21 - 32 mmol/L    Anion Gap 15 10 - 20 mmol/L    Urea Nitrogen 83 (H) 6 - 23 mg/dL    Creatinine 4.34 (H) 0.50 - 1.30 mg/dL    eGFR 13 (L) >60 mL/min/1.73m*2    Calcium 8.5 (L) 8.6 - 10.3 mg/dL            Hospital Course  79-year-old gentleman was admitted with hypoxia and positive COVID-19.     1.  Possible viral pneumonia/COVID-19 pneumonia  Not a candidate for remdesivir because of chronic kidney disease and most however patient declined to get remdesivir therefore he has been started on dexamethasone.  Seen by infectious disease and antibiotics were discontinued since there is no evidence of bacterial pneumonia.     2.  Acute on chronic kidney injury, stable monitor renal function closely.  Serum creatinine is 4.6.     3.  Hypoxia resolved.     Reviewed all labs and imaging studies and discussed the plan of treatment with patient in detail.     I spent 35 minutes in the professional and overall care of this patient.  Stable for discharge to skilled nursing facility today.           Pertinent Physical Exam At Time of Discharge  GENERAL:  Alert, no distress, cooperative, currently on 3 L nasal cannula.  SKIN:  Skin color, texture, turgor normal. No rashes or lesions.  OROPHARYNX:  Lips, mucosa, and tongue are normal.Teeth and gums, normal. Oropharynx normal.  NECK:  No jugulovenous distention, No carotid bruits, Carotid pulse  normal contour, Supple  LUNGS:  Lungs clear to auscultation. Good diaphragmatic excursion.  CARDIAC: Rate and rhythm is regular, normal S1 and S2; no rub, or gallops, 1/6 systolic ejection murmur left sternal border.  ABDOMEN:  Abdomen soft, non-tender, BS normal, No masses or organomegaly  EXTREMETIES:  Extremities normal, no deformities, edema, clubbing or skin discoloration. Good capillary refill., No ulcers  NEURO:  Alert, oriented X 3, Gait not examined. Non-focal. Reflexes normal and symmetric. Sensation grossly intact., Cranial nerves II-XII intact  PULSES:  2+ radial, 2+ carotid  Intake/Output last 3 Shifts:  I/O last 3 completed shifts:  In: 548 (7.2 mL/kg) [P.O.:548]  Out: 760 (10 mL/kg) [Urine:760 (0.3 mL/kg/hr)]  Weight: 75.7 kg      Relevant Results  Scheduled medications    Scheduled Medications   dexAMETHasone, 6 mg, oral, Daily  famotidine, 10 mg, oral, Nightly  ferrous sulfate (325 mg ferrous sulfate), 65 mg of iron, oral, Daily  heparin (porcine), 5,000 Units, subcutaneous, q8h  oxygen, , inhalation, Continuous - Inhalation  polyethylene glycol, 17 g, oral, Daily  rosuvastatin, 10 mg, oral, Nightly  sodium bicarbonate, 1,300 mg, oral, BID  tamsulosin, 0.4 mg, oral, Nightly         Continuous medications  Continuous Medications         PRN medications  PRN Medications   PRN medications: acetaminophen **OR** acetaminophen **OR** acetaminophen, acetaminophen **OR** acetaminophen **OR** acetaminophen, melatonin, ondansetron **OR** ondansetron            Results for orders placed or performed during the hospital encounter of 08/25/24 (from the past 96 hour(s))   CBC and Auto Differential   Result Value Ref Range     WBC 7.1 4.4 - 11.3 x10*3/uL     nRBC 0.0 0.0 - 0.0 /100 WBCs     RBC 4.49 (L) 4.50 - 5.90 x10*6/uL     Hemoglobin 14.3 13.5 - 17.5 g/dL     Hematocrit 44.9 41.0 - 52.0 %      80 - 100 fL     MCH 31.8 26.0 - 34.0 pg     MCHC 31.8 (L) 32.0 - 36.0 g/dL     RDW 14.2 11.5 - 14.5 %      Platelets 177 150 - 450 x10*3/uL     Neutrophils % 75.3 40.0 - 80.0 %     Immature Granulocytes %, Automated 0.7 0.0 - 0.9 %     Lymphocytes % 12.3 13.0 - 44.0 %     Monocytes % 11.4 2.0 - 10.0 %     Eosinophils % 0.0 0.0 - 6.0 %     Basophils % 0.3 0.0 - 2.0 %     Neutrophils Absolute 5.37 1.60 - 5.50 x10*3/uL     Immature Granulocytes Absolute, Automated 0.05 0.00 - 0.50 x10*3/uL     Lymphocytes Absolute 0.88 0.80 - 3.00 x10*3/uL     Monocytes Absolute 0.81 (H) 0.05 - 0.80 x10*3/uL     Eosinophils Absolute 0.00 0.00 - 0.40 x10*3/uL     Basophils Absolute 0.02 0.00 - 0.10 x10*3/uL   Magnesium   Result Value Ref Range     Magnesium 2.40 1.60 - 2.40 mg/dL   Comprehensive metabolic panel   Result Value Ref Range     Glucose 73 (L) 74 - 99 mg/dL     Sodium 146 (H) 136 - 145 mmol/L     Potassium 4.6 3.5 - 5.3 mmol/L     Chloride 115 (H) 98 - 107 mmol/L     Bicarbonate 18 (L) 21 - 32 mmol/L     Anion Gap 18 10 - 20 mmol/L     Urea Nitrogen 69 (H) 6 - 23 mg/dL     Creatinine 4.68 (H) 0.50 - 1.30 mg/dL     eGFR 12 (L) >60 mL/min/1.73m*2     Calcium 9.9 8.6 - 10.3 mg/dL     Albumin 3.8 3.4 - 5.0 g/dL     Alkaline Phosphatase 106 33 - 136 U/L     Total Protein 7.6 6.4 - 8.2 g/dL     AST 30 9 - 39 U/L     Bilirubin, Total 0.5 0.0 - 1.2 mg/dL     ALT 19 10 - 52 U/L   B-Type Natriuretic Peptide   Result Value Ref Range     BNP 60 0 - 99 pg/mL   Blood Gas Venous Full Panel   Result Value Ref Range     POCT pH, Venous 7.27 (L) 7.33 - 7.43 pH     POCT pCO2, Venous 41 41 - 51 mm Hg     POCT pO2, Venous 23 (L) 35 - 45 mm Hg     POCT SO2, Venous 28 (L) 45 - 75 %     POCT Oxy Hemoglobin, Venous 27.5 (L) 45.0 - 75.0 %     POCT Hematocrit Calculated, Venous 42.0 41.0 - 52.0 %     POCT Sodium, Venous 144 136 - 145 mmol/L     POCT Potassium, Venous 4.8 3.5 - 5.3 mmol/L     POCT Chloride, Venous 115 (H) 98 - 107 mmol/L     POCT Ionized Calicum, Venous 1.30 1.10 - 1.33 mmol/L     POCT Glucose, Venous 90 74 - 99 mg/dL     POCT Lactate,  Venous 1.6 0.4 - 2.0 mmol/L     POCT Base Excess, Venous -7.7 (L) -2.0 - 3.0 mmol/L     POCT HCO3 Calculated, Venous 18.8 (L) 22.0 - 26.0 mmol/L     POCT Hemoglobin, Venous 13.9 13.5 - 17.5 g/dL     POCT Anion Gap, Venous 15.0 10.0 - 25.0 mmol/L     Patient Temperature 37.0 degrees Celsius     FiO2 32 %   Blood Culture     Specimen: Peripheral Venipuncture; Blood culture   Result Value Ref Range     Blood Culture No growth at 2 days     Troponin I, High Sensitivity   Result Value Ref Range     Troponin I, High Sensitivity 21 (H) 0 - 20 ng/L   Blood Culture     Specimen: Peripheral Venipuncture; Blood culture   Result Value Ref Range     Blood Culture No growth at 2 days     Sars-CoV-2 PCR   Result Value Ref Range     Coronavirus 2019, PCR Detected (A) Not Detected   Influenza A, and B PCR   Result Value Ref Range     Flu A Result Not Detected Not Detected     Flu B Result Not Detected Not Detected   POCT GLUCOSE   Result Value Ref Range     POCT Glucose 129 (H) 74 - 99 mg/dL   CBC   Result Value Ref Range     WBC 3.3 (L) 4.4 - 11.3 x10*3/uL     nRBC 0.0 0.0 - 0.0 /100 WBCs     RBC 3.74 (L) 4.50 - 5.90 x10*6/uL     Hemoglobin 12.1 (L) 13.5 - 17.5 g/dL     Hematocrit 39.5 (L) 41.0 - 52.0 %      (H) 80 - 100 fL     MCH 32.4 26.0 - 34.0 pg     MCHC 30.6 (L) 32.0 - 36.0 g/dL     RDW 14.1 11.5 - 14.5 %     Platelets 151 150 - 450 x10*3/uL   Comprehensive metabolic panel   Result Value Ref Range     Glucose 179 (H) 74 - 99 mg/dL     Sodium 143 136 - 145 mmol/L     Potassium 4.6 3.5 - 5.3 mmol/L     Chloride 115 (H) 98 - 107 mmol/L     Bicarbonate 15 (L) 21 - 32 mmol/L     Anion Gap 18 10 - 20 mmol/L     Urea Nitrogen 71 (H) 6 - 23 mg/dL     Creatinine 4.94 (H) 0.50 - 1.30 mg/dL     eGFR 11 (L) >60 mL/min/1.73m*2     Calcium 8.8 8.6 - 10.3 mg/dL     Albumin 3.1 (L) 3.4 - 5.0 g/dL     Alkaline Phosphatase 80 33 - 136 U/L     Total Protein 6.1 (L) 6.4 - 8.2 g/dL     AST 19 9 - 39 U/L     Bilirubin, Total 0.3 0.0 - 1.2  mg/dL     ALT 16 10 - 52 U/L   CBC   Result Value Ref Range     WBC 8.1 4.4 - 11.3 x10*3/uL     nRBC 0.0 0.0 - 0.0 /100 WBCs     RBC 4.07 (L) 4.50 - 5.90 x10*6/uL     Hemoglobin 12.6 (L) 13.5 - 17.5 g/dL     Hematocrit 41.1 41.0 - 52.0 %      (H) 80 - 100 fL     MCH 31.0 26.0 - 34.0 pg     MCHC 30.7 (L) 32.0 - 36.0 g/dL     RDW 13.9 11.5 - 14.5 %     Platelets 169 150 - 450 x10*3/uL   Basic Metabolic Panel   Result Value Ref Range     Glucose 152 (H) 74 - 99 mg/dL     Sodium 142 136 - 145 mmol/L     Potassium 5.4 (H) 3.5 - 5.3 mmol/L     Chloride 116 (H) 98 - 107 mmol/L     Bicarbonate 15 (L) 21 - 32 mmol/L     Anion Gap 16 10 - 20 mmol/L     Urea Nitrogen 79 (H) 6 - 23 mg/dL     Creatinine 4.90 (H) 0.50 - 1.30 mg/dL     eGFR 11 (L) >60 mL/min/1.73m*2     Calcium 8.6 8.6 - 10.3 mg/dL   Urinalysis with Reflex Culture and Microscopic   Result Value Ref Range     Color, Urine Light-Yellow Light-Yellow, Yellow, Dark-Yellow     Appearance, Urine Clear Clear     Specific Gravity, Urine 1.023 1.005 - 1.035     pH, Urine 5.5 5.0, 5.5, 6.0, 6.5, 7.0, 7.5, 8.0     Protein, Urine 50 (1+) (A) NEGATIVE, 10 (TRACE), 20 (TRACE) mg/dL     Glucose, Urine 150 (2+) (A) Normal mg/dL     Blood, Urine 0.03 (TRACE) (A) NEGATIVE     Ketones, Urine NEGATIVE NEGATIVE mg/dL     Bilirubin, Urine NEGATIVE NEGATIVE     Urobilinogen, Urine Normal Normal mg/dL     Nitrite, Urine NEGATIVE NEGATIVE     Leukocyte Esterase, Urine NEGATIVE NEGATIVE   Legionella Antigen, Urine     Specimen: Urine   Result Value Ref Range     L. pneumophila Urine Ag Negative Negative   Streptococcus pneumoniae Antigen, Urine     Specimen: Urine   Result Value Ref Range     Streptococcus pneumoniae Ag, Urine Negative Negative   Urinalysis Microscopic   Result Value Ref Range     WBC, Urine NONE 1-5, NONE /HPF     RBC, Urine NONE NONE, 1-2, 3-5 /HPF     Mucus, Urine FEW Reference range not established. /LPF   Extra Urine Gray Tube   Result Value Ref Range     Extra  Tube Hold for add-ons.     CBC   Result Value Ref Range     WBC 9.8 4.4 - 11.3 x10*3/uL     nRBC 0.0 0.0 - 0.0 /100 WBCs     RBC 4.12 (L) 4.50 - 5.90 x10*6/uL     Hemoglobin 13.1 (L) 13.5 - 17.5 g/dL     Hematocrit 40.3 (L) 41.0 - 52.0 %     MCV 98 80 - 100 fL     MCH 31.8 26.0 - 34.0 pg     MCHC 32.5 32.0 - 36.0 g/dL     RDW 13.7 11.5 - 14.5 %     Platelets 185 150 - 450 x10*3/uL   Basic Metabolic Panel   Result Value Ref Range     Glucose 99 74 - 99 mg/dL     Sodium 144 136 - 145 mmol/L     Potassium 5.3 3.5 - 5.3 mmol/L     Chloride 118 (H) 98 - 107 mmol/L     Bicarbonate 16 (L) 21 - 32 mmol/L     Anion Gap 15 10 - 20 mmol/L     Urea Nitrogen 83 (H) 6 - 23 mg/dL     Creatinine 4.34 (H) 0.50 - 1.30 mg/dL     eGFR 13 (L) >60 mL/min/1.73m*2     Calcium 8.5 (L) 8.6 - 10.3 mg/dL    XR chest 2 views     Result Date: 8/25/2024  Interpreted By:  Stew Prince, STUDY: XR CHEST 2 VIEWS;  8/25/2024 6:43 pm   INDICATION: Signs/Symptoms:r/o PNA.   COMPARISON: CXR 12/21/2011   ACCESSION NUMBER(S): [id number]   ORDERING CLINICIAN: PARAM CAI   FINDINGS: Left chest wall pacemaker with leads in expected position.   CARDIOMEDIASTINAL SILHOUETTE: Cardiomediastinal silhouette is normal in size and configuration.   LUNGS: Prominence of the interstitial markings likely reflecting pulmonary edema. Bibasilar consolidations likely reflecting atelectasis.   ABDOMEN: No remarkable upper abdominal findings.   BONES: No acute osseous abnormality.        Prominence of the interstitial markings may reflect pulmonary edema or viral pneumonia. There are additional bibasilar consolidations which are likely to represent atelectasis but superimposed infection is difficult to exclude.   No pleural effusion or pneumothorax.   MACRO: None   Signed by: Stew Prince 8/25/2024 7:27 PM Dictation workstation:   [id number]                   Outpatient Follow-Up  Future Appointments   Date Time Provider Department Center   10/2/2024 10:40 AM  Driss Miller MD [id number] [facility]   10/7/2024  2:00 PM Maximilian Diez MD [id number] [facility]   10/22/2024  8:40 AM MD NADIR GainesUURO [facility]   11/12/2024 10:30 AM Lincoln Lopez MD [id number] [facility]         Rome Kerns MD

## 2024-08-28 NOTE — ASSESSMENT & PLAN NOTE
79-year-old gentleman was admitted with hypoxia and positive COVID-19.    1.  Possible viral pneumonia/COVID-19 pneumonia  Not a candidate for remdesivir because of chronic kidney disease and most however patient declined to get remdesivir therefore he has been started on dexamethasone.  Seen by infectious disease and antibiotics were discontinued since there is no evidence of bacterial pneumonia.    2.  Acute on chronic kidney injury, stable monitor renal function closely.  Serum creatinine is 4.6.    3.  Hypoxia resolved.    Reviewed all labs and imaging studies and discussed the plan of treatment with patient in detail.

## 2024-08-28 NOTE — CARE PLAN
The patient's goals for the shift include      The clinical goals for the shift include Maintain 02 levels of 95%      Problem: Pain - Adult  Goal: Verbalizes/displays adequate comfort level or baseline comfort level  Outcome: Progressing     Problem: Safety - Adult  Goal: Free from fall injury  Outcome: Progressing     Problem: Discharge Planning  Goal: Discharge to home or other facility with appropriate resources  Outcome: Progressing     Problem: Chronic Conditions and Co-morbidities  Goal: Patient's chronic conditions and co-morbidity symptoms are monitored and maintained or improved  Outcome: Progressing

## 2024-08-28 NOTE — PROGRESS NOTES
Antonio Willis is a 79 y.o. male on day 3 of admission presenting with Oxygen deficiency.    Subjective   Patient seen and examined.  He is coming along fine, his breathing is better and currently he was off oxygen and felt all right.  He still has some cough but not bringing up any phlegm.  He feels weak and has difficulty ambulation and PT OT has advised him to go to the skilled nursing facility for rehab and the bed he is already available.  He remains afebrile, he is hemodynamically stable for discharge to skilled nursing facility today.       Objective     Physical Exam    Last Recorded Vitals  GENERAL:  Alert, no distress, cooperative, currently on 3 L nasal cannula.  SKIN:  Skin color, texture, turgor normal. No rashes or lesions.  OROPHARYNX:  Lips, mucosa, and tongue are normal.Teeth and gums, normal. Oropharynx normal.  NECK:  No jugulovenous distention, No carotid bruits, Carotid pulse normal contour, Supple  LUNGS:  Lungs clear to auscultation. Good diaphragmatic excursion.  CARDIAC: Rate and rhythm is regular, normal S1 and S2; no rub, or gallops, 1/6 systolic ejection murmur left sternal border.  ABDOMEN:  Abdomen soft, non-tender, BS normal, No masses or organomegaly  EXTREMETIES:  Extremities normal, no deformities, edema, clubbing or skin discoloration. Good capillary refill., No ulcers  NEURO:  Alert, oriented X 3, Gait not examined. Non-focal. Reflexes normal and symmetric. Sensation grossly intact., Cranial nerves II-XII intact  PULSES:  2+ radial, 2+ carotid  Intake/Output last 3 Shifts:  I/O last 3 completed shifts:  In: 548 (7.2 mL/kg) [P.O.:548]  Out: 760 (10 mL/kg) [Urine:760 (0.3 mL/kg/hr)]  Weight: 75.7 kg     Relevant Results  Scheduled medications  dexAMETHasone, 6 mg, oral, Daily  famotidine, 10 mg, oral, Nightly  ferrous sulfate (325 mg ferrous sulfate), 65 mg of iron, oral, Daily  heparin (porcine), 5,000 Units, subcutaneous, q8h  oxygen, , inhalation, Continuous -  Inhalation  polyethylene glycol, 17 g, oral, Daily  rosuvastatin, 10 mg, oral, Nightly  sodium bicarbonate, 1,300 mg, oral, BID  tamsulosin, 0.4 mg, oral, Nightly      Continuous medications     PRN medications  PRN medications: acetaminophen **OR** acetaminophen **OR** acetaminophen, acetaminophen **OR** acetaminophen **OR** acetaminophen, melatonin, ondansetron **OR** ondansetron   Results for orders placed or performed during the hospital encounter of 08/25/24 (from the past 96 hour(s))   CBC and Auto Differential   Result Value Ref Range    WBC 7.1 4.4 - 11.3 x10*3/uL    nRBC 0.0 0.0 - 0.0 /100 WBCs    RBC 4.49 (L) 4.50 - 5.90 x10*6/uL    Hemoglobin 14.3 13.5 - 17.5 g/dL    Hematocrit 44.9 41.0 - 52.0 %     80 - 100 fL    MCH 31.8 26.0 - 34.0 pg    MCHC 31.8 (L) 32.0 - 36.0 g/dL    RDW 14.2 11.5 - 14.5 %    Platelets 177 150 - 450 x10*3/uL    Neutrophils % 75.3 40.0 - 80.0 %    Immature Granulocytes %, Automated 0.7 0.0 - 0.9 %    Lymphocytes % 12.3 13.0 - 44.0 %    Monocytes % 11.4 2.0 - 10.0 %    Eosinophils % 0.0 0.0 - 6.0 %    Basophils % 0.3 0.0 - 2.0 %    Neutrophils Absolute 5.37 1.60 - 5.50 x10*3/uL    Immature Granulocytes Absolute, Automated 0.05 0.00 - 0.50 x10*3/uL    Lymphocytes Absolute 0.88 0.80 - 3.00 x10*3/uL    Monocytes Absolute 0.81 (H) 0.05 - 0.80 x10*3/uL    Eosinophils Absolute 0.00 0.00 - 0.40 x10*3/uL    Basophils Absolute 0.02 0.00 - 0.10 x10*3/uL   Magnesium   Result Value Ref Range    Magnesium 2.40 1.60 - 2.40 mg/dL   Comprehensive metabolic panel   Result Value Ref Range    Glucose 73 (L) 74 - 99 mg/dL    Sodium 146 (H) 136 - 145 mmol/L    Potassium 4.6 3.5 - 5.3 mmol/L    Chloride 115 (H) 98 - 107 mmol/L    Bicarbonate 18 (L) 21 - 32 mmol/L    Anion Gap 18 10 - 20 mmol/L    Urea Nitrogen 69 (H) 6 - 23 mg/dL    Creatinine 4.68 (H) 0.50 - 1.30 mg/dL    eGFR 12 (L) >60 mL/min/1.73m*2    Calcium 9.9 8.6 - 10.3 mg/dL    Albumin 3.8 3.4 - 5.0 g/dL    Alkaline Phosphatase 106 33 -  136 U/L    Total Protein 7.6 6.4 - 8.2 g/dL    AST 30 9 - 39 U/L    Bilirubin, Total 0.5 0.0 - 1.2 mg/dL    ALT 19 10 - 52 U/L   B-Type Natriuretic Peptide   Result Value Ref Range    BNP 60 0 - 99 pg/mL   Blood Gas Venous Full Panel   Result Value Ref Range    POCT pH, Venous 7.27 (L) 7.33 - 7.43 pH    POCT pCO2, Venous 41 41 - 51 mm Hg    POCT pO2, Venous 23 (L) 35 - 45 mm Hg    POCT SO2, Venous 28 (L) 45 - 75 %    POCT Oxy Hemoglobin, Venous 27.5 (L) 45.0 - 75.0 %    POCT Hematocrit Calculated, Venous 42.0 41.0 - 52.0 %    POCT Sodium, Venous 144 136 - 145 mmol/L    POCT Potassium, Venous 4.8 3.5 - 5.3 mmol/L    POCT Chloride, Venous 115 (H) 98 - 107 mmol/L    POCT Ionized Calicum, Venous 1.30 1.10 - 1.33 mmol/L    POCT Glucose, Venous 90 74 - 99 mg/dL    POCT Lactate, Venous 1.6 0.4 - 2.0 mmol/L    POCT Base Excess, Venous -7.7 (L) -2.0 - 3.0 mmol/L    POCT HCO3 Calculated, Venous 18.8 (L) 22.0 - 26.0 mmol/L    POCT Hemoglobin, Venous 13.9 13.5 - 17.5 g/dL    POCT Anion Gap, Venous 15.0 10.0 - 25.0 mmol/L    Patient Temperature 37.0 degrees Celsius    FiO2 32 %   Blood Culture    Specimen: Peripheral Venipuncture; Blood culture   Result Value Ref Range    Blood Culture No growth at 2 days    Troponin I, High Sensitivity   Result Value Ref Range    Troponin I, High Sensitivity 21 (H) 0 - 20 ng/L   Blood Culture    Specimen: Peripheral Venipuncture; Blood culture   Result Value Ref Range    Blood Culture No growth at 2 days    Sars-CoV-2 PCR   Result Value Ref Range    Coronavirus 2019, PCR Detected (A) Not Detected   Influenza A, and B PCR   Result Value Ref Range    Flu A Result Not Detected Not Detected    Flu B Result Not Detected Not Detected   POCT GLUCOSE   Result Value Ref Range    POCT Glucose 129 (H) 74 - 99 mg/dL   CBC   Result Value Ref Range    WBC 3.3 (L) 4.4 - 11.3 x10*3/uL    nRBC 0.0 0.0 - 0.0 /100 WBCs    RBC 3.74 (L) 4.50 - 5.90 x10*6/uL    Hemoglobin 12.1 (L) 13.5 - 17.5 g/dL    Hematocrit 39.5  (L) 41.0 - 52.0 %     (H) 80 - 100 fL    MCH 32.4 26.0 - 34.0 pg    MCHC 30.6 (L) 32.0 - 36.0 g/dL    RDW 14.1 11.5 - 14.5 %    Platelets 151 150 - 450 x10*3/uL   Comprehensive metabolic panel   Result Value Ref Range    Glucose 179 (H) 74 - 99 mg/dL    Sodium 143 136 - 145 mmol/L    Potassium 4.6 3.5 - 5.3 mmol/L    Chloride 115 (H) 98 - 107 mmol/L    Bicarbonate 15 (L) 21 - 32 mmol/L    Anion Gap 18 10 - 20 mmol/L    Urea Nitrogen 71 (H) 6 - 23 mg/dL    Creatinine 4.94 (H) 0.50 - 1.30 mg/dL    eGFR 11 (L) >60 mL/min/1.73m*2    Calcium 8.8 8.6 - 10.3 mg/dL    Albumin 3.1 (L) 3.4 - 5.0 g/dL    Alkaline Phosphatase 80 33 - 136 U/L    Total Protein 6.1 (L) 6.4 - 8.2 g/dL    AST 19 9 - 39 U/L    Bilirubin, Total 0.3 0.0 - 1.2 mg/dL    ALT 16 10 - 52 U/L   CBC   Result Value Ref Range    WBC 8.1 4.4 - 11.3 x10*3/uL    nRBC 0.0 0.0 - 0.0 /100 WBCs    RBC 4.07 (L) 4.50 - 5.90 x10*6/uL    Hemoglobin 12.6 (L) 13.5 - 17.5 g/dL    Hematocrit 41.1 41.0 - 52.0 %     (H) 80 - 100 fL    MCH 31.0 26.0 - 34.0 pg    MCHC 30.7 (L) 32.0 - 36.0 g/dL    RDW 13.9 11.5 - 14.5 %    Platelets 169 150 - 450 x10*3/uL   Basic Metabolic Panel   Result Value Ref Range    Glucose 152 (H) 74 - 99 mg/dL    Sodium 142 136 - 145 mmol/L    Potassium 5.4 (H) 3.5 - 5.3 mmol/L    Chloride 116 (H) 98 - 107 mmol/L    Bicarbonate 15 (L) 21 - 32 mmol/L    Anion Gap 16 10 - 20 mmol/L    Urea Nitrogen 79 (H) 6 - 23 mg/dL    Creatinine 4.90 (H) 0.50 - 1.30 mg/dL    eGFR 11 (L) >60 mL/min/1.73m*2    Calcium 8.6 8.6 - 10.3 mg/dL   Urinalysis with Reflex Culture and Microscopic   Result Value Ref Range    Color, Urine Light-Yellow Light-Yellow, Yellow, Dark-Yellow    Appearance, Urine Clear Clear    Specific Gravity, Urine 1.023 1.005 - 1.035    pH, Urine 5.5 5.0, 5.5, 6.0, 6.5, 7.0, 7.5, 8.0    Protein, Urine 50 (1+) (A) NEGATIVE, 10 (TRACE), 20 (TRACE) mg/dL    Glucose, Urine 150 (2+) (A) Normal mg/dL    Blood, Urine 0.03 (TRACE) (A) NEGATIVE     Ketones, Urine NEGATIVE NEGATIVE mg/dL    Bilirubin, Urine NEGATIVE NEGATIVE    Urobilinogen, Urine Normal Normal mg/dL    Nitrite, Urine NEGATIVE NEGATIVE    Leukocyte Esterase, Urine NEGATIVE NEGATIVE   Legionella Antigen, Urine    Specimen: Urine   Result Value Ref Range    L. pneumophila Urine Ag Negative Negative   Streptococcus pneumoniae Antigen, Urine    Specimen: Urine   Result Value Ref Range    Streptococcus pneumoniae Ag, Urine Negative Negative   Urinalysis Microscopic   Result Value Ref Range    WBC, Urine NONE 1-5, NONE /HPF    RBC, Urine NONE NONE, 1-2, 3-5 /HPF    Mucus, Urine FEW Reference range not established. /LPF   Extra Urine Gray Tube   Result Value Ref Range    Extra Tube Hold for add-ons.    CBC   Result Value Ref Range    WBC 9.8 4.4 - 11.3 x10*3/uL    nRBC 0.0 0.0 - 0.0 /100 WBCs    RBC 4.12 (L) 4.50 - 5.90 x10*6/uL    Hemoglobin 13.1 (L) 13.5 - 17.5 g/dL    Hematocrit 40.3 (L) 41.0 - 52.0 %    MCV 98 80 - 100 fL    MCH 31.8 26.0 - 34.0 pg    MCHC 32.5 32.0 - 36.0 g/dL    RDW 13.7 11.5 - 14.5 %    Platelets 185 150 - 450 x10*3/uL   Basic Metabolic Panel   Result Value Ref Range    Glucose 99 74 - 99 mg/dL    Sodium 144 136 - 145 mmol/L    Potassium 5.3 3.5 - 5.3 mmol/L    Chloride 118 (H) 98 - 107 mmol/L    Bicarbonate 16 (L) 21 - 32 mmol/L    Anion Gap 15 10 - 20 mmol/L    Urea Nitrogen 83 (H) 6 - 23 mg/dL    Creatinine 4.34 (H) 0.50 - 1.30 mg/dL    eGFR 13 (L) >60 mL/min/1.73m*2    Calcium 8.5 (L) 8.6 - 10.3 mg/dL    XR chest 2 views    Result Date: 8/25/2024  Interpreted By:  Stew Prince, STUDY: XR CHEST 2 VIEWS;  8/25/2024 6:43 pm   INDICATION: Signs/Symptoms:r/o PNA.   COMPARISON: CXR 12/21/2011   ACCESSION NUMBER(S): [id number]   ORDERING CLINICIAN: PARAM CAI   FINDINGS: Left chest wall pacemaker with leads in expected position.   CARDIOMEDIASTINAL SILHOUETTE: Cardiomediastinal silhouette is normal in size and configuration.   LUNGS: Prominence of the interstitial  markings likely reflecting pulmonary edema. Bibasilar consolidations likely reflecting atelectasis.   ABDOMEN: No remarkable upper abdominal findings.   BONES: No acute osseous abnormality.       Prominence of the interstitial markings may reflect pulmonary edema or viral pneumonia. There are additional bibasilar consolidations which are likely to represent atelectasis but superimposed infection is difficult to exclude.   No pleural effusion or pneumothorax.   MACRO: None   Signed by: Stew Prince 8/25/2024 7:27 PM Dictation workstation:   [id number]             Assessment/Plan   Assessment & Plan  Oxygen deficiency  79-year-old gentleman was admitted with hypoxia and positive COVID-19.    1.  Possible viral pneumonia/COVID-19 pneumonia  Not a candidate for remdesivir because of chronic kidney disease and most however patient declined to get remdesivir therefore he has been started on dexamethasone.  Seen by infectious disease and antibiotics were discontinued since there is no evidence of bacterial pneumonia.    2.  Acute on chronic kidney injury, stable monitor renal function closely.  Serum creatinine is 4.6.    3.  Hypoxia resolved.    Reviewed all labs and imaging studies and discussed the plan of treatment with patient in detail.       I spent 35 minutes in the professional and overall care of this patient.  Stable for discharge to skilled nursing facility today.    Rome Kerns MD

## 2024-08-29 ENCOUNTER — APPOINTMENT (OUTPATIENT)
Dept: UROLOGY | Facility: HOSPITAL | Age: 79
End: 2024-08-29
Payer: MEDICARE

## 2024-08-29 LAB
BACTERIA BLD CULT: NORMAL
BACTERIA BLD CULT: NORMAL

## 2024-09-09 ENCOUNTER — HOSPITAL ENCOUNTER (OUTPATIENT)
Dept: CARDIOLOGY | Facility: CLINIC | Age: 79
Discharge: HOME | End: 2024-09-09
Payer: MEDICARE

## 2024-09-09 DIAGNOSIS — I49.5 SICK SINUS SYNDROME (MULTI): ICD-10-CM

## 2024-09-09 DIAGNOSIS — Z95.0 PRESENCE OF CARDIAC PACEMAKER: ICD-10-CM

## 2024-09-18 ENCOUNTER — OFFICE VISIT (OUTPATIENT)
Dept: PULMONOLOGY | Facility: CLINIC | Age: 79
End: 2024-09-18
Payer: MEDICARE

## 2024-09-18 VITALS
WEIGHT: 167 LBS | SYSTOLIC BLOOD PRESSURE: 116 MMHG | TEMPERATURE: 96.5 F | OXYGEN SATURATION: 91 % | RESPIRATION RATE: 18 BRPM | DIASTOLIC BLOOD PRESSURE: 69 MMHG | BODY MASS INDEX: 26.95 KG/M2 | HEART RATE: 87 BPM

## 2024-09-18 DIAGNOSIS — R06.2 WHEEZING: Primary | ICD-10-CM

## 2024-09-18 DIAGNOSIS — J84.9 INTERSTITIAL LUNG DISEASE (MULTI): ICD-10-CM

## 2024-09-18 DIAGNOSIS — J96.11 HYPOXEMIC RESPIRATORY FAILURE, CHRONIC (MULTI): ICD-10-CM

## 2024-09-18 PROCEDURE — 1111F DSCHRG MED/CURRENT MED MERGE: CPT | Performed by: INTERNAL MEDICINE

## 2024-09-18 PROCEDURE — 1159F MED LIST DOCD IN RCRD: CPT | Performed by: INTERNAL MEDICINE

## 2024-09-18 PROCEDURE — 99214 OFFICE O/P EST MOD 30 MIN: CPT | Performed by: INTERNAL MEDICINE

## 2024-09-18 PROCEDURE — 99204 OFFICE O/P NEW MOD 45 MIN: CPT | Performed by: INTERNAL MEDICINE

## 2024-09-18 RX ORDER — ALBUTEROL SULFATE 90 UG/1
2 INHALANT RESPIRATORY (INHALATION) EVERY 4 HOURS PRN
Qty: 18 G | Refills: 5 | Status: SHIPPED | OUTPATIENT
Start: 2024-09-18

## 2024-09-18 NOTE — PROGRESS NOTES
Division of Pulmonary, Critical Care, and Sleep Medicine    Reason for the consultation     Antonio Willis is a 79 y.o. male who presents to a [facility] for an evaluation for ILD and post covid .  I have independently interviewed and examined the patient in the office and reviewed available records.    Physician BRENNA (9/18/2024):    He is a 79-year-old  male, prior heavy smoker of 30 pack/year, also cigar smoker for many years quit 3 weeks ago.  Also known to have history of cardiac pacemaker, chronic kidney disease stage V, with solitary kidney, left hide hydronephrosis chronic, with left ureteric stone, multiple sclerosis, hypertension, hyperlipidemia.patient was recently admitted in August 2024 for COVID-19 infection.  A chest x-ray showed bilateral interstitial marking.  He was treated with antibiotics for bacterial pneumonia and also dexamethasone.  He was discharged on home oxygen.  He finished 10 days in rehab.  He is currently still not fully recovered.  Indeed he is less than 50% back to baseline.  His wife tells me that in June when he was admitted for urinary retention he was told he needs oxygen.  There is also an abdominal CT from 2020 that showed bilateral lower lobe fibrotic changes in the form of groundglass opacities, reticulation and traction bronchiectasis.  He is currently on 2 L nasal cannula via a pulse POC.  His oxygen today was 91%.  Without the oxygen his wife tells me that his O2 dropped to the mid 70s.    He was never diagnosed with COPD or emphysema.  He was never placed on inhaler.  Denies any history of asthma.  He denies any recurrent pulmonary infection or hospitalization.  He describes wheezing at night.  MMRC 3 - I stop for breath after walking about 100 yards or after a few minutes on level ground.    MMRC 4 - I am too breathless to leave the house or I am breathless when  dressing.   He denies any fever chills night sweats weight loss loss  of appetite he denies any burning when he urinates.    PMH  Past Medical History:   Diagnosis Date    Cardiac pacemaker     Implanted 10/17/2011 due to SSS    Chronic anemia     CKD (chronic kidney disease), stage IV (Multi)     Granuloma annulare     Hemiplegia, unspecified affecting right dominant side (Multi)     Right Hemiparesis from MS    History of GI bleed     UGI due to gastric and duodenal ulcer    History of prostate cancer     dx 02/13/2008; treated with hormone tx and external beam radiation    Hydronephrosis concurrent with and due to calculi of kidney and ureter     Recurrent episodes on the LEFT    Hyperkalemia     Due to CKD; followed by Dr North Higgins    Hyperlipidemia, unspecified     Multiple sclerosis (Multi)     Dx 2011    Pyuria     Chronic    Recurrent kidney stones     Left side    Tremor, unspecified        Previous pulmonary history:  no history of recurrent infections, or lung disease as a child.      Hospitalization History: Recent hospitalization for COVID-19    Immunization History:  Immunization History   Administered Date(s) Administered    Flu vaccine (IIV4), preservative free *Check age/dose* 09/14/2020    Flu vaccine, quadrivalent, high-dose, preservative free, age 65y+ (FLUZONE) 10/04/2022    Flu vaccine, trivalent, preservative free, age 6 months and greater (Fluarix/Fluzone/Flulaval) 10/04/2014, 08/16/2016, 09/17/2020    Influenza, Seasonal, Quadrivalent, Adjuvanted 10/23/2021    Influenza, injectable, quadrivalent 09/23/2019    Influenza, seasonal, injectable 09/21/2013, 08/30/2014, 09/29/2015, 09/13/2017    Influenza, trivalent, adjuvanted 09/06/2018    Pfizer COVID-19 vaccine, bivalent, age 12 years and older (30 mcg/0.3 mL) 11/10/2022    Pfizer Purple Cap SARS-CoV-2 09/24/2021    Td vaccine, age 7 years and older (TDVAX) 07/14/2009, 05/15/2012       Family History:  Family History   Problem Relation Name Age of Onset    Leukemia Mother      Heart attack Father       Diabetes Father      Diabetes Sister      Tremor Sister      Leukemia Brother      Parkinsonism Father's Sister       Past Medical History  He has a past medical history of Cardiac pacemaker, Chronic anemia, CKD (chronic kidney disease), stage IV (Multi), Granuloma annulare, Hemiplegia, unspecified affecting right dominant side (Multi), History of GI bleed, History of prostate cancer, Hydronephrosis concurrent with and due to calculi of kidney and ureter, Hyperkalemia, Hyperlipidemia, unspecified, Multiple sclerosis (Multi), Pyuria, Recurrent kidney stones, and Tremor, unspecified.     Surgical History  He has a past surgical history that includes Hernia repair (Right, 1993); US guided abscess drain (11/24/2021); IR placement of nephrostomy catheter (11/24/2021); Prostate biopsy (02/13/2008); ORIF ankle fracture (Right, 2003); Cystoscopy w/ laser lithotripsy (Left); Extracorporeal shock wave lithotripsy (Left); Bladder stone removal (11/09/2007); and pacemaker placement (10/17/2011).     Social History  Smoked cigarettes age 10 for 30 years one ppd and then cigars about 5 a day in the last 2 years down to 2    Salesman retired 2013        Current Medications:  Current Outpatient Medications   Medication Instructions    cholecalciferol (VITAMIN D-3) 25 mcg, oral, Daily    dexAMETHasone (DECADRON) 6 mg, oral, Daily    famotidine (PEPCID) 20 mg, oral, Nightly    ferrous sulfate 325 (65 Fe) MG tablet Iron 325 (65 Fe) MG Oral Tablet   Refills: 0        Start : 20-Jul-2022   Active    multivit-iron-minerals-folic acid (Centrum Silver) 0.4 mg-300 mcg- 250 mcg tab 1 tablet, oral, Daily    oxygen (O2) 2 L/min, inhalation, Continuous    rosuvastatin (CRESTOR) 10 mg, oral, Nightly    tamsulosin (Flomax) 0.4 mg 24 hr capsule 1 capsule, oral, Nightly        Drug Allergies/Intolerances:  No Known Allergies     Review of Systems:  All systems have been reviewed and are negative for findings pertinent to the chief complaint except  as detailed below or described in the HPI.  Constitutional: Denies symptoms related to weight loss, fever or chills.  ENT: Denies symptoms related to hearing loss, sore throat, sinusitis and masses.  Cardiovascular: Denies symptoms related to chest pain, palpitations, edema and orthopnea.  Respiratory: As per HPI   gastrointestinal: Denies symptoms related to nausea, vomiting, diarrhea or constipation. GERD   Genitourinary: Denies symptoms related to hematuria, nocturia, frequency or urgency.  Musculoskeletal: Denies symptoms related to arthritis, joint pain, leg pain, weakness or joint stiffness. Raynaud's  Integumentary: Denies symptoms related to skin rashes, moles, pigment changes or ulcers.  Neurological: Denies symptoms related to dizziness, syncope, seizures, tremors or paralysis.  Psychiatric: Denies psychiatric symptoms associated with PTSD, depression, anxiety, psychosis or hallucinations.  Endocrine: Denies endocrine symptoms related to diabetes, polyuria, and cold/heat intolerance.  Hematologic: Denies hematologic symptoms associated with anemia, bruising, bleeding or lymph node tenderness.  Allergic: Denies allergic symptoms associated with allergy to meds, foods or contrast dye.  All other review of systems are negative and/or non-contributory.    Physical Examination:  BP 90/58   Pulse 64   Temp 35.8 °C (96.5 °F)   Resp 18   Wt 75.8 kg (167 lb)   SpO2 91% Comment: patient on 2 liters of oxygen  BMI 26.95 kg/m²   Repeat 116   General: ambulated independently; no acute distress; well-nourished; work of breathing was not increased; normal vocal character  HEENT: normocephalic; anicteric sclerae; conjunctivae not injected; nasal mucosa was unremarkable; oropharynx was clear without evidence of thrush; dentition was good.  Neck: supple; no lymphadenopathy or thyromegaly.  Chest: Bilateral crackles one third up  Cardiac: regular rhythm; no gallop or murmur.  Abdomen: soft; non-tender; non-distended;  "no hepatosplenomegaly.  Extremities: no leg edema; no digital clubbing; 2+ pulses  Psychiatric: did not appear depressed or anxious.      Diagnostic testing       -PFTs [facility]  -Six mn walk: Date    Distance   Pox   none  -Imaging: I have personally visualized the most recent imaging and I agree with the radiologist interpretation   === 08/25/24 ===    XR CHEST 2 VIEWS    - Impression -  Prominence of the interstitial markings may reflect pulmonary edema  or viral pneumonia. There are additional bibasilar consolidations  which are likely to represent atelectasis but superimposed infection  is difficult to exclude.  Abdomen Ct 2020 was reviewed by me and shows bilateral lower lobe groundglass opacities reticulation and some mild traction bronchiectasis  -TTE      -EKG:     -Pathology:    Eosinophils Absolute (x10*3/uL)   Date Value   08/25/2024 0.00   06/15/2024 0.32   06/14/2024 0.26       No results found for: \"IGE\"    Assessment and Recommendations:    Mr. Willis is a 79 y.o. male prior heavy smoker of at least 30 pack/year who was smoking cigars up until 3 weeks ago who has chronic interstitial lung disease on an abdominal CT dating back to 2020.  Patient was never diagnosed with COPD or ILD.  Patient not never been on oxygen up until recently when he was hospitalized for COVID-19 infection.  A chest x-ray showed increased bilateral interstitial marking but I suspect this could be the chronic findings present in 2020.  He was discharged on oxygen.  We will need to investigate by getting a full workup including a pulmonary function test and 6-minute walk test and an HRCT         "

## 2024-09-18 NOTE — PATIENT INSTRUCTIONS
Dear Antonio Willis It was nice seeing you today. We discussed the following:     You have scarring on the lungs dating back to at least 2020.  You recently suffered from COVID-19 infection and you are recovering slowly.  Continue physical therapy.  Continue oxygen at 2 L for now until we do a more formal assessment with a walking test, a breathing test and a CAT scan of the lungs.  Please schedule those the end of September.  Please call the office when they are completed for results.  I will see you back in 1-2 months       For scheduling purposes:    Call [phone] to schedule a breathing or a walking test     Call [phone] to schedule  EKG's, Echocardiograms and Cardiopulmonary Stress Tests.    Call [phone] to schedule Radiology tests such as Nuclear Medicine Stress Tests, CT Scans, and MRI's.    Should you have any questions Please Call my assistant Aby Lua at [phone] or our pulmonary nurse Lizeth Donaldson [phone].

## 2024-09-24 ENCOUNTER — LAB (OUTPATIENT)
Dept: LAB | Facility: LAB | Age: 79
End: 2024-09-24
Payer: MEDICARE

## 2024-09-24 ENCOUNTER — HOSPITAL ENCOUNTER (OUTPATIENT)
Dept: RESPIRATORY THERAPY | Facility: CLINIC | Age: 79
Discharge: HOME | End: 2024-09-24
Payer: MEDICARE

## 2024-09-24 DIAGNOSIS — N25.81 SECONDARY HYPERPARATHYROIDISM OF RENAL ORIGIN (MULTI): ICD-10-CM

## 2024-09-24 DIAGNOSIS — J84.9 INTERSTITIAL LUNG DISEASE (MULTI): ICD-10-CM

## 2024-09-24 DIAGNOSIS — E55.9 VITAMIN D DEFICIENCY, UNSPECIFIED: ICD-10-CM

## 2024-09-24 DIAGNOSIS — R06.2 WHEEZING: ICD-10-CM

## 2024-09-24 DIAGNOSIS — N18.4 CHRONIC KIDNEY DISEASE, STAGE 4 (SEVERE) (MULTI): Primary | ICD-10-CM

## 2024-09-24 LAB
ALBUMIN SERPL BCP-MCNC: 3.5 G/DL (ref 3.4–5)
ANION GAP SERPL CALC-SCNC: 14 MMOL/L (ref 10–20)
BUN SERPL-MCNC: 79 MG/DL (ref 6–23)
CALCIUM SERPL-MCNC: 9.4 MG/DL (ref 8.6–10.3)
CHLORIDE SERPL-SCNC: 115 MMOL/L (ref 98–107)
CO2 SERPL-SCNC: 20 MMOL/L (ref 21–32)
CREAT SERPL-MCNC: 4.56 MG/DL (ref 0.5–1.3)
EGFRCR SERPLBLD CKD-EPI 2021: 12 ML/MIN/1.73M*2
ERYTHROCYTE [DISTWIDTH] IN BLOOD BY AUTOMATED COUNT: 15.6 % (ref 11.5–14.5)
GLUCOSE SERPL-MCNC: 89 MG/DL (ref 74–99)
HCT VFR BLD AUTO: 39.4 % (ref 41–52)
HGB BLD-MCNC: 12.4 G/DL (ref 13.5–17.5)
MCH RBC QN AUTO: 32.5 PG (ref 26–34)
MCHC RBC AUTO-ENTMCNC: 31.5 G/DL (ref 32–36)
MCV RBC AUTO: 103 FL (ref 80–100)
NRBC BLD-RTO: 0 /100 WBCS (ref 0–0)
PHOSPHATE SERPL-MCNC: 3.3 MG/DL (ref 2.5–4.9)
PLATELET # BLD AUTO: 196 X10*3/UL (ref 150–450)
POTASSIUM SERPL-SCNC: 4.7 MMOL/L (ref 3.5–5.3)
RBC # BLD AUTO: 3.82 X10*6/UL (ref 4.5–5.9)
SODIUM SERPL-SCNC: 144 MMOL/L (ref 136–145)
WBC # BLD AUTO: 8.5 X10*3/UL (ref 4.4–11.3)

## 2024-09-24 PROCEDURE — 85027 COMPLETE CBC AUTOMATED: CPT

## 2024-09-24 PROCEDURE — 94727 GAS DIL/WSHOT DETER LNG VOL: CPT

## 2024-09-24 PROCEDURE — 94618 PULMONARY STRESS TESTING: CPT

## 2024-09-24 PROCEDURE — 98960 EDU&TRN PT SELF-MGMT NQHP 1: CPT

## 2024-09-24 PROCEDURE — 94060 EVALUATION OF WHEEZING: CPT | Performed by: INTERNAL MEDICINE

## 2024-09-24 PROCEDURE — 94729 DIFFUSING CAPACITY: CPT

## 2024-09-24 PROCEDURE — 94664 DEMO&/EVAL PT USE INHALER: CPT

## 2024-09-24 PROCEDURE — 94760 N-INVAS EAR/PLS OXIMETRY 1: CPT

## 2024-09-24 PROCEDURE — 94060 EVALUATION OF WHEEZING: CPT

## 2024-09-24 PROCEDURE — 36415 COLL VENOUS BLD VENIPUNCTURE: CPT

## 2024-09-24 PROCEDURE — 80069 RENAL FUNCTION PANEL: CPT

## 2024-09-24 PROCEDURE — 94727 GAS DIL/WSHOT DETER LNG VOL: CPT | Performed by: INTERNAL MEDICINE

## 2024-09-24 PROCEDURE — 82306 VITAMIN D 25 HYDROXY: CPT

## 2024-09-24 PROCEDURE — 94729 DIFFUSING CAPACITY: CPT | Performed by: INTERNAL MEDICINE

## 2024-09-24 PROCEDURE — 94618 PULMONARY STRESS TESTING: CPT | Performed by: INTERNAL MEDICINE

## 2024-09-24 PROCEDURE — 83970 ASSAY OF PARATHORMONE: CPT

## 2024-09-25 LAB
25(OH)D3 SERPL-MCNC: 86 NG/ML (ref 30–100)
PTH-INTACT SERPL-MCNC: 121.9 PG/ML (ref 18.5–88)

## 2024-09-27 ENCOUNTER — APPOINTMENT (OUTPATIENT)
Dept: RESPIRATORY THERAPY | Facility: CLINIC | Age: 79
End: 2024-09-27
Payer: MEDICARE

## 2024-09-27 ENCOUNTER — HOSPITAL ENCOUNTER (OUTPATIENT)
Dept: CARDIOLOGY | Facility: CLINIC | Age: 79
Discharge: HOME | End: 2024-09-27
Payer: MEDICARE

## 2024-09-27 DIAGNOSIS — Z95.0 PRESENCE OF CARDIAC PACEMAKER: ICD-10-CM

## 2024-09-27 DIAGNOSIS — I49.5 SICK SINUS SYNDROME (MULTI): ICD-10-CM

## 2024-09-27 PROCEDURE — 93294 REM INTERROG EVL PM/LDLS PM: CPT | Performed by: INTERNAL MEDICINE

## 2024-09-27 PROCEDURE — 93296 REM INTERROG EVL PM/IDS: CPT

## 2024-09-30 ENCOUNTER — HOSPITAL ENCOUNTER (OUTPATIENT)
Dept: RADIOLOGY | Facility: HOSPITAL | Age: 79
Discharge: HOME | End: 2024-09-30
Payer: MEDICARE

## 2024-09-30 DIAGNOSIS — J84.9 INTERSTITIAL LUNG DISEASE (MULTI): ICD-10-CM

## 2024-09-30 DIAGNOSIS — R06.2 WHEEZING: ICD-10-CM

## 2024-09-30 PROCEDURE — 71250 CT THORAX DX C-: CPT | Performed by: RADIOLOGY

## 2024-09-30 PROCEDURE — 71250 CT THORAX DX C-: CPT

## 2024-10-02 ENCOUNTER — OFFICE VISIT (OUTPATIENT)
Dept: CARDIOLOGY | Facility: HOSPITAL | Age: 79
End: 2024-10-02
Payer: MEDICARE

## 2024-10-02 VITALS
HEIGHT: 65 IN | DIASTOLIC BLOOD PRESSURE: 66 MMHG | OXYGEN SATURATION: 94 % | HEART RATE: 62 BPM | BODY MASS INDEX: 28.16 KG/M2 | WEIGHT: 169 LBS | SYSTOLIC BLOOD PRESSURE: 98 MMHG

## 2024-10-02 DIAGNOSIS — Z95.0 PRESENCE OF CARDIAC PACEMAKER: ICD-10-CM

## 2024-10-02 DIAGNOSIS — Z72.0 TOBACCO USE: ICD-10-CM

## 2024-10-02 DIAGNOSIS — R06.02 SHORTNESS OF BREATH: ICD-10-CM

## 2024-10-02 DIAGNOSIS — E78.00 PURE HYPERCHOLESTEROLEMIA: ICD-10-CM

## 2024-10-02 DIAGNOSIS — I49.5 SICK SINUS SYNDROME (MULTI): ICD-10-CM

## 2024-10-02 DIAGNOSIS — I25.10 CORONARY ARTERIOSCLEROSIS: Primary | ICD-10-CM

## 2024-10-02 LAB
ATRIAL RATE: 62 BPM
P AXIS: 27 DEGREES
P OFFSET: 162 MS
P ONSET: 94 MS
PR INTERVAL: 192 MS
Q ONSET: 188 MS
QRS COUNT: 10 BEATS
QRS DURATION: 146 MS
QT INTERVAL: 452 MS
QTC CALCULATION(BAZETT): 458 MS
QTC FREDERICIA: 457 MS
R AXIS: -86 DEGREES
T AXIS: 23 DEGREES
T OFFSET: 414 MS
VENTRICULAR RATE: 62 BPM

## 2024-10-02 PROCEDURE — 99406 BEHAV CHNG SMOKING 3-10 MIN: CPT | Performed by: INTERNAL MEDICINE

## 2024-10-02 PROCEDURE — G2211 COMPLEX E/M VISIT ADD ON: HCPCS | Performed by: INTERNAL MEDICINE

## 2024-10-02 PROCEDURE — 93005 ELECTROCARDIOGRAM TRACING: CPT | Performed by: INTERNAL MEDICINE

## 2024-10-02 PROCEDURE — 99214 OFFICE O/P EST MOD 30 MIN: CPT | Performed by: INTERNAL MEDICINE

## 2024-10-02 PROCEDURE — 1159F MED LIST DOCD IN RCRD: CPT | Performed by: INTERNAL MEDICINE

## 2024-10-02 PROCEDURE — 1160F RVW MEDS BY RX/DR IN RCRD: CPT | Performed by: INTERNAL MEDICINE

## 2024-10-02 RX ORDER — ACETAMINOPHEN 325 MG/1
650 TABLET ORAL EVERY 6 HOURS PRN
COMMUNITY
Start: 2024-08-28

## 2024-10-02 ASSESSMENT — ENCOUNTER SYMPTOMS
OCCASIONAL FEELINGS OF UNSTEADINESS: 0
DEPRESSION: 0
LOSS OF SENSATION IN FEET: 0

## 2024-10-02 NOTE — PROGRESS NOTES
Primary Care Physician: Maximilian Diez MD  Date of Visit: 10/02/2024 10:40 AM EDT  Location of visit: [facility]     Chief Complaint:   Chief Complaint   Patient presents with    Hospital Follow-up     Hospitalized 08-25 thru 08-    Follow-up        HPI / Summary:   Antonio Willis is a 79 y.o. male presents for followup.  He was admitted with COVID in August and has been on home oxygen since discharge.  He does note some improvement in his dyspnea on exertion since discharge.  He walks with the assistance of a walker.  The patient denies chest pain,  palpitations, lightheadedness, syncope, orthopnea, paroxysmal nocturnal dyspnea, lower extremity edema, or bleeding problems.          Past Medical History:   Diagnosis Date    Cardiac pacemaker     Implanted 10/17/2011 due to SSS    Chronic anemia     CKD (chronic kidney disease), stage IV (Multi)     Granuloma annulare     Hemiplegia, unspecified affecting right dominant side (Multi)     Right Hemiparesis from MS    History of GI bleed     UGI due to gastric and duodenal ulcer    History of prostate cancer     dx 02/13/2008; treated with hormone tx and external beam radiation    Hydronephrosis concurrent with and due to calculi of kidney and ureter     Recurrent episodes on the LEFT    Hyperkalemia     Due to CKD; followed by Dr North Higgins    Hyperlipidemia, unspecified     Multiple sclerosis (Multi)     Dx 2011    Pyuria     Chronic    Recurrent kidney stones     Left side    Tremor, unspecified         Past Surgical History:   Procedure Laterality Date    BLADDER STONE REMOVAL  11/09/2007    Cystolithalopaxy    CYSTOSCOPY W/ LASER LITHOTRIPSY Left     12/3/20, 5/6/21, 5/20/21, 8/26/21, 8/24/23    EXTRACORPOREAL SHOCK WAVE LITHOTRIPSY Left     10/01/20, 10/29/20    HERNIA REPAIR Right 1993    Right inguinal hernia repair    IR  NEPHROSTOMY PLACEMENT  11/24/2021    IR  NEPHROSTOMY PLACEMENT 11/24/2021 [facility]    ORIF ANKLE FRACTURE Right  "2003    PACEMAKER PLACEMENT  10/17/2011    PROSTATE BIOPSY  02/13/2008    + cancer    US GUIDED ABSCESS DRAIN  11/24/2021    US GUIDED ABSCESS DRAIN 11/24/2021 [facility]          Social History:   reports that he has been smoking cigars. He has been exposed to tobacco smoke. He does not have any smokeless tobacco history on file. He reports current alcohol use of about 1.0 standard drink of alcohol per week. He reports that he does not use drugs.     Family History:  family history includes Diabetes in his father and sister; Heart attack in his father; Leukemia in his brother and mother; Parkinsonism in his father's sister; Tremor in his sister.      Allergies:  No Known Allergies    Outpatient Medications:  Current Outpatient Medications   Medication Instructions    acetaminophen (TYLENOL) 650 mg, oral, Every 6 hours PRN    albuterol 90 mcg/actuation inhaler 2 puffs, inhalation, Every 4 hours PRN    cholecalciferol (VITAMIN D-3) 25 mcg, oral, Daily    famotidine (PEPCID) 20 mg, oral, Nightly    ferrous sulfate 325 (65 Fe) MG tablet Iron 325 (65 Fe) MG Oral Tablet   Refills: 0        Start : 20-Jul-2022   Active    multivit-iron-minerals-folic acid (Centrum Silver) 0.4 mg-300 mcg- 250 mcg tab 1 tablet, oral, Daily    oxygen (O2) 2 L/min, inhalation, Continuous    rosuvastatin (CRESTOR) 10 mg, oral, Nightly    tamsulosin (Flomax) 0.4 mg 24 hr capsule 1 capsule, oral, Nightly       Physical Exam:  Vitals:    10/02/24 1055   BP: 98/66   BP Location: Left arm   Patient Position: Sitting   BP Cuff Size: Adult   Pulse: 62   SpO2: 94%   Weight: 76.7 kg (169 lb)   Height: 1.651 m (5' 5\")     Wt Readings from Last 5 Encounters:   10/02/24 76.7 kg (169 lb)   09/18/24 75.8 kg (167 lb)   08/25/24 75.8 kg (167 lb)   06/13/24 79.9 kg (176 lb 1.6 oz)   08/15/23 79 kg (174 lb 0.9 oz)     Body mass index is 28.12 kg/m².   General: Well-developed well-nourished in no acute distress  HEENT: Normocephalic atraumatic  Neck: Supple, " "JVP is normal negative hepatojugular reflux 2+ carotid pulses without bruit  Pulmonary: Normal respiratory effort, diffuse bilateral crackles  Cardiovascular: No right ventricular heave, normal S1 and S2, no murmurs rubs or gallops  Abdomen: Soft nontender nondistended  Extremities: Warm without edema 2+ radial pulses bilaterally   Neurologic: Alert and oriented x3  Psychiatric: Normal mood and affect     Last Labs:  CMP:  Recent Labs     09/24/24  1524 08/28/24  0654 08/27/24  0533    144 142   K 4.7 5.3 5.4*   * 118* 116*   CO2 20* 16* 15*   ANIONGAP 14 15 16   BUN 79* 83* 79*   CREATININE 4.56* 4.34* 4.90*   EGFR 12* 13* 11*   GLUCOSE 89 99 152*     Recent Labs     09/24/24  1524 08/26/24  0827 08/25/24  1904 06/13/24  1206   ALBUMIN 3.5 3.1* 3.8 3.7   ALKPHOS  --  80 106 108   ALT  --  16 19 12   AST  --  19 30 14   BILITOT  --  0.3 0.5 0.5     CBC:  Recent Labs     09/24/24  1524 08/28/24  0654 08/27/24  0533   WBC 8.5 9.8 8.1   HGB 12.4* 13.1* 12.6*   HCT 39.4* 40.3* 41.1    185 169   * 98 101*     COAG: No results for input(s): \"INR\", \"DDIMERVTE\" in the last 42243 hours.  HEME/ENDO:  Recent Labs     12/06/21  1350 09/15/21  1422   IRONSAT 21* 13*      CARDIAC:   Recent Labs     08/25/24  1904   TROPHS 21*   BNP 60     Recent Labs     08/29/23  0825 07/30/22  1002 07/26/21  0902   CHOL 114 112 118   LDLF 55 54 52   HDL 38.4* 44.8 46.5   TRIG 104 67 100         Last Cardiology Tests:  ECG:  An electrocardiogram performed today that I reviewed shows atrial pacing with right bundle branch block left anterior fascicular block.    Echo:  Echo Results:  No results found for this or any previous visit from the past 3650 days.       Cath:      Stress Test:  Stress Results:  No results found for this or any previous visit from the past 365 days.         Cardiac Imaging:  CT chest high-resolution September 30, 2024  HEART AND VESSELS:  The thoracic aorta normal in course and caliber.There is " moderate  calcified atherosclerosis present. Dilated main pulmonary artery  measuring 3.2 cm. Mild coronary artery calcifications are seen.Please  note,the study is not optimized for evaluation of coronary arteries.  The cardiac chambers are not enlarged.There is a left subclavian  approach dual chamber cardiac pacemaker/ICD seen in placewith leads  terminating within right atrium and apical right ventricle. There is  no pericardial effusion seen.  IMPRESSION:  1. Basal predominant subpleural reticulations, traction  bronchiectasis/bronchiolectasis and no convincing evidence of  honeycombing. Findings are in keeping with interstitial lung disease  and might be sequela of chronic smoking (RB ILD). Given the presence  of component of ground-glass opacity, fibrotic NSIP cannot be  excluded.  2. Areas of ground-glass and patchy consolidative opacity in lung  bases which might be due to component of active  inflammatory/infectious process. Consider follow-up chest CT after  resolution of these potentially reversible components to better  assess the underlying lung disease  3. The above-mentioned findings are superimposed on apically  predominant moderate-to-severe centrilobular and paraseptal emphysema.  4. Mildly dilated main pulmonary artery measuring 3.2 cm, correlate  with concern for pulmonary hypertension.  5. Small pulmonary nodule in the left lung apex measures 0.4 cm. No  follow-up imaging is required, however repeat CT chest can be  considered in 12 months.    CT abdomen and pelvis July 2023  Vessels: Unremarkable     Assessment/Plan   Diagnoses and all orders for this visit:  Coronary arteriosclerosis  Presence of cardiac pacemaker  -     ECG 12 Lead  Sick sinus syndrome (Multi)  Pure hypercholesterolemia  -     Lipid panel; Future  Tobacco use  Shortness of breath  -     Transthoracic echo (TTE) complete; Future  -     perflutren lipid microspheres (Definity) injection 0.5-10 mL of dilution  -     perflutren  protein A microsphere (Optison) injection 0.5 mL    In summary, Mr. Willis is a pleasant 79 year-old white male with a past medical history significant for hyperlipidemia with statin intolerance, sinus node dysfunction status post dual-chamber pacemaker placement, bifascicular block, upper GI bleed in the setting of gastric and duodenal ulcers, interstitial lung disease, COPD, chronic tobacco use, and chronic kidney disease.  He does note dyspnea on exertion since having COVID in August.  He is euvolemic on exam.  His recent CT of the chest shows significant pulmonary abnormalities.  He will follow-up with pulmonary.  I did order an echocardiogram to assess his LV function.  I also ordered a fasting lipid profile.  He should continue his current cardiovascular medications.  I strongly encouraged him to stop smoking and spent more than 3 minutes of time counseling to do so.  We will see him back in follow-up in 6 months.      Orders:  Orders Placed This Encounter   Procedures    Lipid panel    ECG 12 Lead    Transthoracic echo (TTE) complete      Followup Appts:  Future Appointments   Date Time Provider Department Center   10/3/2024  9:15 AM Salma Borjas PT AWarrPT [facility]   10/3/2024 10:15 AM Niki Rossi OT GEAWarrOT [facility]   10/4/2024  2:15 PM Maximilian Diez MD [id number] [facility]   10/10/2024 11:30 AM U ULTRASOUND 1 [facility]US [facility]   10/22/2024  8:40 AM MD KRYSTINA Gaines [facility]   11/12/2024 10:30 AM Lincoln Lopez MD [id number] [facility]           ____________________________________________________________  Driss Miller MD  [facility]  [facility]

## 2024-10-02 NOTE — PATIENT INSTRUCTIONS
Stop smoking.  Check a fasting lipid profile.  An echocardiogram.  Follow-up with pulmonology.  Follow-up in 6 months.

## 2024-10-03 ENCOUNTER — CLINICAL SUPPORT (OUTPATIENT)
Dept: PHYSICAL THERAPY | Facility: CLINIC | Age: 79
End: 2024-10-03
Payer: MEDICARE

## 2024-10-03 ENCOUNTER — CLINICAL SUPPORT (OUTPATIENT)
Dept: OCCUPATIONAL THERAPY | Facility: CLINIC | Age: 79
End: 2024-10-03
Payer: MEDICARE

## 2024-10-03 VITALS — OXYGEN SATURATION: 96 % | DIASTOLIC BLOOD PRESSURE: 51 MMHG | SYSTOLIC BLOOD PRESSURE: 97 MMHG

## 2024-10-03 DIAGNOSIS — R68.89 DECREASED STRENGTH, ENDURANCE, AND MOBILITY: ICD-10-CM

## 2024-10-03 DIAGNOSIS — G35 MS (MULTIPLE SCLEROSIS) (MULTI): ICD-10-CM

## 2024-10-03 DIAGNOSIS — R26.9 ABNORMALITY OF GAIT AND MOBILITY: Primary | ICD-10-CM

## 2024-10-03 DIAGNOSIS — R29.898 UPPER EXTREMITY WEAKNESS: Primary | ICD-10-CM

## 2024-10-03 DIAGNOSIS — Z74.09 DECREASED STRENGTH, ENDURANCE, AND MOBILITY: ICD-10-CM

## 2024-10-03 DIAGNOSIS — R53.1 DECREASED STRENGTH, ENDURANCE, AND MOBILITY: ICD-10-CM

## 2024-10-03 DIAGNOSIS — R26.89 IMBALANCE: ICD-10-CM

## 2024-10-03 PROCEDURE — 97110 THERAPEUTIC EXERCISES: CPT | Mod: GP

## 2024-10-03 PROCEDURE — 97166 OT EVAL MOD COMPLEX 45 MIN: CPT | Mod: GO

## 2024-10-03 PROCEDURE — 97162 PT EVAL MOD COMPLEX 30 MIN: CPT | Mod: GP

## 2024-10-03 ASSESSMENT — PAIN - FUNCTIONAL ASSESSMENT
PAIN_FUNCTIONAL_ASSESSMENT: 0-10
PAIN_FUNCTIONAL_ASSESSMENT: 0-10

## 2024-10-03 ASSESSMENT — BALANCE ASSESSMENTS
SITTING WITH BACK UNSUPPORTED BUT FEET SUPPORTED ON FLOOR OR ON A STOOL: ABLE TO SIT SAFELY AND SECURELY FOR 2 MINUTES
STANDING UNSUPPORTED ONE FOOT IN FRONT: LOSES BALANCE WHILE STEPPING OR STANDING
REACHING FORWARD WITH OUTSTRETCHED ARM WHILE STANDING: REACHES FORWARD BUT NEEDS SUPERVISION
TRANSFERS: NEEDS ONE PERSON TO ASSIST
LONG VERSION TOTAL SCORE (MAX 56): 23
STANDING ON ONE LEG: UNABLE TO TRY NEEDS ASSIST TO PREVENT FALL
STANDING TO SITTING: ABLE TO STAND USING HANDS AFTER SEVERAL TRIES
STANDING UNSUPPORTED: ABLE TO STAND 2 MINUTES WITH SUPERVISION
STANDING UNSUPPORTED WITH FEET TOGETHER: ABLE TO PLACE FEET TOGETHER INDEPENDENTLY BUT UNABLE TO HOLD FOR 30 SECONDS
PLACE ALTERNATE FOOT ON STEP OR STOOL WHILE STANDING UNSUPPORTED: TURNS SIDEWAYS ONLY BUT MAINTAINS BALANCE
TURN 360 DEGREES: NEEDS ASSISTANCE WHILE TURNING
PLACE ALTERNATE FOOT ON STEP OR STOOL WHILE STANDING UNSUPPORTED: NEEDS ASSISTANCE TO KEEP FROM FALLING/UNABLE TO TRY
STANDING UNSUPPORTED WITH EYES CLOSED: ABLE TO STAND 10 SECONDS WITH SUPERVISION
STANDING TO SITTING: USES BACK OF LEGS AGAINST CHAIR TO CONTROL DESCENT
PICK UP OBJECT FROM THE FLOOR FROM A STANDING POSITION: ABLE TO PICK UP SLIPPER BUT NEEDS SUPERVISION

## 2024-10-03 ASSESSMENT — PAIN SCALES - GENERAL
PAINLEVEL_OUTOF10: 0 - NO PAIN
PAINLEVEL_OUTOF10: 0 - NO PAIN

## 2024-10-03 ASSESSMENT — ENCOUNTER SYMPTOMS
LOSS OF SENSATION IN FEET: 0
OCCASIONAL FEELINGS OF UNSTEADINESS: 1
DEPRESSION: 0

## 2024-10-03 NOTE — PROGRESS NOTES
Occupational Therapy    Occupational Therapy Evaluation    Name: Antonio Willis  MRN: [id number]  : 1945  Date: 10/03/24      Time Calculation  Start Time: 1015  Stop Time: 1045  Time Calculation (min): 30 min  OT Evaluation Time Entry  OT Evaluation (Moderate) Time Entry: 30                    Visit: 1  [place]   10/3/24-24  Problem List Items Addressed This Visit             ICD-10-CM    MS (multiple sclerosis) (Multi) G35    Relevant Orders    Follow Up In Occupational Therapy    Upper extremity weakness - Primary R29.898    Relevant Orders    Follow Up In Occupational Therapy     Assessment:   Patient is an 79 year old male with dx of MS. Patient on 2L of O2. Patient presents with overall decreased activity tolerance, and BUE weakness (R >L) impacting patient's abilities to participate in ADLs. Patient would benefit from skilled OT to address the above impairments to maximize patient's functional abilities.     *I will continue to assess hand coordination and tone next session as patient reported being very fatigued from PT evaluation right before OT evaluation. Patient only able to tolerate 30 mins of 60 min OT evaluation.    Plan:   OT POC to include: neuro re-ed, manual therapy, therapeutic exercise, therapeutic activity, ADL training, AE/DME training,  HEP    1x  a week for 4-6 weeks  Rehab potential: Fair  Patient in agreement with plan       Subjective    Patient agreeable to OT evaluation. Accompanied by spouse. Patient's spouse reports patient was dx with MS 13 years ago. Reports before getting COVID in 2024, patient needed about min A for ADLs, now requiring more mod to max A.  Patient on 2L of O2. Uses a power chair at home in the house.   Has a home health aide 12 hrs a week. Has a walk-in shower with grab bars and a shower seat.   Reports some difficulty with cutting up food, zippers, buttons.    Current Problem:  1. Upper extremity weakness  Follow Up In Occupational Therapy     "  2. MS (multiple sclerosis) (Multi)  Referral to Occupational Therapy    Follow Up In Occupational Therapy        General:      General  Reason for Referral: OT eval and treat  Referred By:  (Chicho Avina MD)  Past Medical History Relevant to Rehab: cardiac pacemaker  Preferred Learning Style: verbal, visual, written  Per Pulmonary Disease note on 9/18/24:  “He is a 79-year-old  male, prior heavy smoker of 30 pack/year, also cigar smoker for many years quit 3 weeks ago.  Also known to have history of cardiac pacemaker, chronic kidney disease stage V, with solitary kidney, left hide hydronephrosis chronic, with left ureteric stone, multiple sclerosis, hypertension, hyperlipidemia.patient was recently admitted in August 2024 for COVID-19 infection”  Precautions:  Precautions  STEADI Fall Risk Score (The score of 4 or more indicates an increased risk of falling): 7  Vital Signs:   Not taken   Pain Assessment:  Pain Assessment  Pain Assessment: 0-10  0-10 (Numeric) Pain Score: 0 - No pain  Work History:  Retired   Prior Level of Function:  Min A   Roles/Routines:  Watches Tv  Patient Goal:  \"Increase independence\"   Personal Factors that my impact Care:   Going to [place] in 6 weeks  Patient reports does not like to do any type of exercise  Objective   Neuro:  Observation:  Glasses   On 2L O2  Came in transport chair  Originally R handed, but now weaker from the MS so relies more on the L hand.  ROM:  B shoulder flexion~90 degrees  R forearm supination: ~75%   Slight atrophy noted in the R hand  Strength:  R shoulder flexion: 3/5, elbow flexion: 3+/5, wrist extension: 3-/5,  R : 27#  L shoulder flexion: 3+/5, elbow flexion: 4-/5, wrist extension: 3+/5, L : 35#  Coordination:  Did not assess, patient fatigued and requested to end eval early  Sensation:  Denies parathesias in BUE's  Tone:  Did not assess; patient fatigued and requested to end eval early  Tremor   none  Outcome Measures:  OT " Adult Other Outcome Measures  Other Outcome Measures: Quick Dash: (33 /     50.00%)    OP EDUCATION:   Patient and spouse were educated on what POC would look like going forward which would include UE strengthening, and ROM exercises. As well as teaching AE/DME and modified techniques to increase patient's participation in ADL tasks.    Goals:  Active       OT Goals       HEP       Start:  10/03/24    Expected End:  01/01/25       Patient and patient's caregivers will demonstrate independence with established HEP, which will include UE strengthening, ROM and coordination exercises to maximize patient's functional abilities          ADLs       Start:  10/03/24    Expected End:  01/01/25       Patient and patient's spouse will verbalize understanding of AE/DME and modifications to increase patient's participation in ADL tasks.

## 2024-10-03 NOTE — PROGRESS NOTES
Physical Therapy    Physical Therapy Evaluation and Treatment    Patient Name: Antonio Willis  MRN: [id number]  Today's Date: 10/3/2024  Time Calculation  Start Time: 0915  Stop Time: 1015  Time Calculation (min): 60 min  PT Evaluation Time Entry  PT Evaluation (Moderate) Time Entry: 50  PT Therapeutic Procedures Time Entry  Therapeutic Exercise Time Entry: 10    Visit Number: 1   Insurance: [employer] Medicare    Plan of Care: 10/3/24 - 1/1/25  Primary Diagnosis: Abnormality of gait and mobility     Assessment:   Antonio Willis  is a 79 y.o. old patient who participated in a physical therapy evaluation today due to referral for Multiple Sclerosis. Patient with a past medical history of cardiac pacemaker (2011), CKD, R Hemiplegia d/t MS, H/O prostate CA (2008), and tremor. Patient presents with impaired posture, impaired functional LE strength/ROM, impaired coordination, balance deficits, increased R LE spasticity noted with functional mobility, motor control deficits, and decreased functional endurance. Patient with dyspnea and SpO2 = 82% on 2L NC following TUG. Titrated O2 to 3L and pt SpO2 increased to 92% with max cues for diaphragmatic breathing, O2 returned to 2L once SpO2 returned to baseline. Due to these impairments, he has the following functional limitations and participation restrictions: gait deviations, increased fall risk,  transfers, and performing some ADLs. Patient is currently functioning below his functional mobility baseline since his COVID-19 Infection in August 2024 and would benefit from skilled physical therapy services to improve above mentioned impairments and allow for return to his functional baseline of modified independent ambulation of short household distances with RW/Rollator as well as improve activity tolerance. The patient verbalized understanding and is in agreement with all goals and plan of care. Plan of care was developed with input and agreement by the patient.    Plan:   OP PT  "Plan  Treatment/Interventions: Education/ Instruction, Neuromuscular re-education, Manual therapy, Prosthetic management/ training, Orthosis fit/ training, Self care/ home management, Therapeutic exercises, Therapeutic activities  PT Plan: Skilled PT  PT Frequency: Other (Comment) (1-2x/week)  Duration: 10-20 visits  Onset Date: 09/12/24  Certification Period Start Date: 10/03/24  Certification Period End Date: 01/01/25  Rehab Potential: Fair  Plan of Care Agreement: Patient    Current Problem:  Problem List Items Addressed This Visit             ICD-10-CM    MS (multiple sclerosis) (Multi) G35    Relevant Orders    Follow Up In Physical Therapy     Other Visit Diagnoses         Codes    Abnormality of gait and mobility    -  Primary R26.9    Relevant Orders    Follow Up In Physical Therapy    Decreased strength, endurance, and mobility     R53.1, Z74.09, R68.89    Relevant Orders    Follow Up In Physical Therapy    Imbalance     R26.89    Relevant Orders    Follow Up In Physical Therapy          HPI per Dr. Dumont note on 9/18/24:  \"He is a 79-year-old  male, prior heavy smoker of 30 pack/year, also cigar smoker for many years quit 3 weeks ago.  Also known to have history of cardiac pacemaker, chronic kidney disease stage V, with solitary kidney, left hide hydronephrosis chronic, with left ureteric stone, multiple sclerosis, hypertension, hyperlipidemia.patient was recently admitted in August 2024 for COVID-19 infection.\"    Celestine Willis  is a 79 y.o. male  presenting to the clinic with chief complaint of recent COVID-19 infection resulting in increase of MS symptoms such as increased weakness, increased fatigue, and difficulty walking and balance. Wife reports brain-fog occurring as he likes to handle finances. Patient reports 2 falls in the last year prior to COVID infection due to trips without major injury.     SCREENING:  -Hydration status: 10-12 8-oz glasses of water / day  -Sleep: " "8-10 hrs / night  -Numbness/tingling: No  -Headache/dizziness: No    PRIOR LEVEL OF FUNCTION: Min A with ADLs, Wife performs IADLs, Independent Short-household distance ambulation with rollator  CURRENT LEVEL OF FUNCTION: Mod A with ADLs (HHA 12hrs/week), Wife performs IADLs, Power w/c for household mobility  CURRENT DME:  cane, RW, rollator, power w/c, transport chair, shower seat and grab bars      SOCIAL HISTORY/LIVING ARRANGEMENT:   Patient lives with wife in an apartment with elevator access  Patient with first-floor set-up to bedroom/bathroom    EXERCISE/ACTIVITY LEVEL:  sedentary    PATIENT GOAL: \"get a little more stronger\" Per wife: increase independence to some degree and be able to leave him in the home for a little longer than 1 hour.     General:  General  Reason for Referral: PT eval and treat for MS  Referred By: Chicho Avina MD  Past Medical History Relevant to Rehab: cardiac pacemaker (2011), CKD, R Hemiplegia d/t MS, H/O prostate CA (2008), and tremor  Payor: [employer] MEDICARE / Plan: [employer] GOLDEN MEDICARE / Product Type: *No Product type* /   Chicho Avina    Precautions  Precautions  STEADI Fall Risk Score (The score of 4 or more indicates an increased risk of falling): 7  Precautions Comment: High Fall Risk  SpO2: 96 %  BP: 97/51  BP Location: Left arm  BP Method: Automatic  Patient Position: Sitting    Pain  Pain Assessment: 0-10  0-10 (Numeric) Pain Score: 0 - No pain    Vital Signs:  Vital Signs  SpO2: 96 %  BP: 97/51  BP Location: Left arm  BP Method: Automatic  Patient Position: Sitting     Objective     MUSCULOSKELETAL SYSTEM:  GROSS POSTURE:  stand/sitting: rounded shoulders, forward head  PASSIVE RANGE OF MOTION: WFL BL LE   FLEXIBILITY: Impaired HS R/L   LE STRENGTH - tested in seated:  Muscle Right LE Left LE   Hip Flexion (L1-L2) 3-/5 4/5   Hip Abduction 3+/5 4/5   Hip Adduction 4-/5 4/5   Knee Flexion (L3) 3+/5 4/5   Knee Extension (L4) 3+/5 4/5   Ankle " Dorsiflexion (L5) 3+/5 4/5   Ankle Plantarflexion (S1) 3/5 4/5     NEUROMUSCULAR SYSTEM:   GROSS SENSATION:  Intact to light touch R/L  Tested L2-S1 Bilateral  TONE: R LE spasticity with gait noted  COORDINATION:    SAHIL:  hypometria R/L   HTS:   dysmetria R / normal L  PROPRIOCEPTION:  Intact R/L 3/3 at ankle  TRANSFERS:       SIT <> STAND:  Modified Independent with armrests and CGA  GAIT:  Patient ambulated 10 feet with RW at CGA. Patient demonstrated  slow debra, decreased step length R>L, decreased step height R>L, R LE abducted, R LE drag throughout swing phase, decreased R knee flexion in swing, decreased heel-strike and toe off R LE     ENDURANCE: Patient with decreased functional endurance as demonstrated by decreased activity tolerance noted by need for frequent seated rest breaks between activities    Outcome Measures:  Funes Balance Scale  1. Sitting to Standing: Able to stand using hands after several tries  2. Standing Unsupported: Able to stand 2 minutes with supervision  3. Sitting with Back Unsupported but Feet Supported on Floor or on a Stool: Able to sit safely and securely for 2 minutes  4. Standing to Sitting: Uses back of legs against chair to control descent  5.  Transfers: Needs one person to assist  6. Standing Unsupported with Eyes Closed: Able to stand 10 seconds with supervision  7. Standing Unsupported with Feet Together: Able to place feet together independently but unable to hold for 30 seconds  8. Reach Forward with Outstretched Arm While Standing: Reaches forward but needs supervision  9.  Object from Floor from a Standing Position: Able to  slipper but needs supervision  10. Turning to Look Behind Over Left and Right Shoulders While Standing: Turns sideways only but maintains balance  11. Turn 360 Degrees: Needs assistance while turning  12. Place Alternate Foot on Step or Stool While Standing Unsupported: Needs assistance to keep from falling/unable to try  13.  Standing Unsupported One Foot in Front: Loses balance while stepping or standing  14. Standing on One Leg: Unable to try needs assist to prevent fall  Funes Balance Score: 23    Timed Up and Go Test  Observe the patient’s postural stability, gait, stride length, and sway. Select All that Apply::  (R LE abducted and drag)  TUG Comment: 56.97 sec with RW 2L NC O2 and CGA  **Patient with dyspnea and SpO2 = 82% on 2L NC following TUG. Titrated O2 to 3L and pt SpO2 increased to 92% with max cues for diaphragmatic breathing, O2 returned to 2L once SpO2 returned to baseline**    Other Measures  30 Second Sit to Stand: 5 stands with BL arms  Activities - Specific Balance Confidence Scale: 210 (13%)      Treatments:  THERAPEUTIC EXERCISE x 10 minutes  - Seated Hamstring Stretch Manual 3 x 30 sec hold  - Educated on proper sit to stand transfer technique  - Sit to Stand with Armchair at CGA x 5  - Scapular Retraction x 10  - Seated March x 10  - Seated Long Arc Quad  x 5    Educated patient on purpose and benefits of neurologic physical therapy for diagnosis  Educated patient on purpose of exercises and importance of regular daily home program compliance  Provided patient with visual, verbal, and written instruction via printed home program handout to ensure carryover      EDUCATION: see treatment section above for details.    HOME EXERCISE PROGRAM:  Access Code: [email or web address]  URL: [email or web address]  Date: 10/03/2024  Prepared by: Salma Borjas    Program Notes  Perform with Wife or Aide    Exercises  - Seated Hamstring Stretch with Chair  - 1 x daily - 7 x weekly - 3 sets - 30 sec hold  - Sit to Stand with Armchair  - 1 x daily - 7 x weekly - 3 sets - 10 reps  - Seated Scapular Retraction  - 1 x daily - 7 x weekly - 3 sets - 10 reps  - Seated March  - 1 x daily - 7 x weekly - 3 sets - 10 reps  - Seated Long Arc Quad  - 1 x daily - 7 x weekly - 3 sets - 10 reps    Goals:  Active       PT Problem       Patient will  perform TUG with RW at Supervision in </= 40 sec to demonstrate decreased fall risk       Start:  10/03/24    Expected End:  01/01/25            Patient will perform 7 stands with arm rests on 30 sec STS test to demonstrate improved functional LE strength to assist with gait, stair negotiation, and functional transfers       Start:  10/03/24    Expected End:  01/01/25            Patient will improve GODWIN balance test by >/= 3 pts to meet MCID for MS population and demonstrate improved dynamic balance        Start:  10/03/24    Expected End:  01/01/25            Patient will improve ABC Scale by 10% to demonstrate improved balance confidence        Start:  10/03/24    Expected End:  01/01/25            Patient will be Modified Indep with home exercise program to demonstrate compliance, improved activity tolerance, and self-management of diagnosis       Start:  10/03/24    Expected End:  01/01/25            Patient Stated Goal - improve strength and tolerance to activity        Start:  10/03/24    Expected End:  01/01/25

## 2024-10-04 ENCOUNTER — APPOINTMENT (OUTPATIENT)
Dept: PRIMARY CARE | Facility: CLINIC | Age: 79
End: 2024-10-04
Payer: MEDICARE

## 2024-10-04 VITALS — DIASTOLIC BLOOD PRESSURE: 65 MMHG | SYSTOLIC BLOOD PRESSURE: 115 MMHG

## 2024-10-04 DIAGNOSIS — R53.1 GENERALIZED WEAKNESS: ICD-10-CM

## 2024-10-04 DIAGNOSIS — R06.00 DYSPNEA, UNSPECIFIED TYPE: Primary | ICD-10-CM

## 2024-10-04 DIAGNOSIS — J43.9 COMBINED PULMONARY FIBROSIS AND EMPHYSEMA (CPFE) (MULTI): ICD-10-CM

## 2024-10-04 DIAGNOSIS — J84.10 COMBINED PULMONARY FIBROSIS AND EMPHYSEMA (CPFE) (MULTI): ICD-10-CM

## 2024-10-04 DIAGNOSIS — J44.9 CHRONIC OBSTRUCTIVE PULMONARY DISEASE, UNSPECIFIED COPD TYPE (MULTI): Primary | ICD-10-CM

## 2024-10-04 DIAGNOSIS — N28.9 ABNORMAL RENAL FUNCTION: ICD-10-CM

## 2024-10-04 PROCEDURE — 99203 OFFICE O/P NEW LOW 30 MIN: CPT | Performed by: INTERNAL MEDICINE

## 2024-10-04 RX ORDER — ALBUTEROL SULFATE 0.83 MG/ML
2.5 SOLUTION RESPIRATORY (INHALATION) EVERY 4 HOURS PRN
Qty: 360 ML | Refills: 5 | Status: SHIPPED | OUTPATIENT
Start: 2024-10-04 | End: 2024-10-04

## 2024-10-04 RX ORDER — ALBUTEROL SULFATE 0.83 MG/ML
2.5 SOLUTION RESPIRATORY (INHALATION) 2 TIMES DAILY
Qty: 360 ML | Refills: 5 | Status: SHIPPED | OUTPATIENT
Start: 2024-10-04

## 2024-10-04 NOTE — PROGRESS NOTES
Subjective   Patient ID: Antonio Willis is a 79 y.o. male who presents for No chief complaint on file..    HPI patient for first time Sipp dated from she recent hospitalization and COVID on oxygen and is followed up with pulmonary Dr. Rush cardiology Dr. Miller and kidneys Dr. Higgins goes to [place] for the winter time no cp or new sob no se with medication bowels ok no dysuria    Review of Systems    Objective   There were no vitals taken for this visit.  DRAGON ISSUE  Physical Exam vital signs noted alert and oriented x 3 NCAT no JVD chest clear to auscultation and percussion dry cough no wheeze CV regular rate and rhythm S1-S2 without murmur gallop or rub extremities no clubbing cyanosis or edema normal distal pulses    Assessment/Plan    impression CO PD renal failure General Weakness  Plan discussed with standing for exercise discussed with incentive spirometry continue with the medication albuterol as needed he is up at night several times wears 2 depends is uncertain about more medicines that might help with the bladder continue with other medicines reviewed laboratory results has upcoming ultrasound of the kidney and upcoming echocardiogram for the heart olmos in [place] leaving in November may recheck before that and may invoke his long-term care therapy  Review chart for follow up visit (check)

## 2024-10-04 NOTE — PROGRESS NOTES
Spoke with the patient wife.  He is slowly improving.  He is wearing his oxygen 2 to 3 L with physical therapy.  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  The worsening fibrosis could be due to the recent COVID-19 infection.  I told her if this is the case we expect some improvement over the next few months.  Also updated her of the results of the breathing test with a 10% bronchodilator response and I gave her a prescription for albuterol nebulizer to use twice a day.  Agree with TTE to investigate for pulm HTN  RTC in april after they come back from [place] with a chest CT and testing

## 2024-10-07 ENCOUNTER — APPOINTMENT (OUTPATIENT)
Dept: PRIMARY CARE | Facility: CLINIC | Age: 79
End: 2024-10-07
Payer: MEDICARE

## 2024-10-10 ENCOUNTER — HOSPITAL ENCOUNTER (OUTPATIENT)
Dept: RADIOLOGY | Facility: HOSPITAL | Age: 79
Discharge: HOME | End: 2024-10-10
Payer: MEDICARE

## 2024-10-10 ENCOUNTER — HOSPITAL ENCOUNTER (OUTPATIENT)
Dept: CARDIOLOGY | Facility: HOSPITAL | Age: 79
Discharge: HOME | End: 2024-10-10
Payer: MEDICARE

## 2024-10-10 VITALS — HEIGHT: 65 IN | WEIGHT: 169 LBS | BODY MASS INDEX: 28.16 KG/M2

## 2024-10-10 DIAGNOSIS — N18.5 CHRONIC KIDNEY DISEASE, STAGE 5 (MULTI): ICD-10-CM

## 2024-10-10 DIAGNOSIS — N13.9 OBSTRUCTIVE AND REFLUX UROPATHY, UNSPECIFIED: ICD-10-CM

## 2024-10-10 DIAGNOSIS — R06.02 SHORTNESS OF BREATH: ICD-10-CM

## 2024-10-10 PROCEDURE — 93306 TTE W/DOPPLER COMPLETE: CPT

## 2024-10-10 PROCEDURE — 93306 TTE W/DOPPLER COMPLETE: CPT | Performed by: INTERNAL MEDICINE

## 2024-10-10 PROCEDURE — 76770 US EXAM ABDO BACK WALL COMP: CPT

## 2024-10-11 LAB
AORTIC VALVE MEAN GRADIENT: 2.6 MMHG
AORTIC VALVE PEAK VELOCITY: 1.17 M/S
AV PEAK GRADIENT: 5.5 MMHG
AVA (PEAK VEL): 2.86 CM2
AVA (VTI): 2.73 CM2
EJECTION FRACTION APICAL 4 CHAMBER: 73.5
EJECTION FRACTION: 63 %
LEFT ATRIUM VOLUME AREA LENGTH INDEX BSA: 13 ML/M2
LEFT VENTRICLE INTERNAL DIMENSION DIASTOLE: 3.63 CM (ref 3.5–6)
LEFT VENTRICULAR OUTFLOW TRACT DIAMETER: 1.99 CM
MITRAL VALVE E/A RATIO: 0.62
RIGHT VENTRICLE FREE WALL PEAK S': 10 CM/S
RIGHT VENTRICLE PEAK SYSTOLIC PRESSURE: 58.6 MMHG
TRICUSPID ANNULAR PLANE SYSTOLIC EXCURSION: 2 CM

## 2024-10-14 ENCOUNTER — CLINICAL SUPPORT (OUTPATIENT)
Dept: OCCUPATIONAL THERAPY | Facility: CLINIC | Age: 79
End: 2024-10-14
Payer: MEDICARE

## 2024-10-14 DIAGNOSIS — R29.898 UPPER EXTREMITY WEAKNESS: Primary | ICD-10-CM

## 2024-10-14 DIAGNOSIS — G35 MS (MULTIPLE SCLEROSIS) (MULTI): ICD-10-CM

## 2024-10-14 PROCEDURE — 97110 THERAPEUTIC EXERCISES: CPT | Mod: GO

## 2024-10-14 ASSESSMENT — PAIN - FUNCTIONAL ASSESSMENT: PAIN_FUNCTIONAL_ASSESSMENT: 0-10

## 2024-10-14 ASSESSMENT — PAIN SCALES - GENERAL: PAINLEVEL_OUTOF10: 0 - NO PAIN

## 2024-10-14 NOTE — PROGRESS NOTES
"Occupational Therapy    Occupational Therapy Treatment    Name: Antonio Willis  MRN: [id number]  : 1945  Date: 10/14/24    Time Calculation  Start Time: 930  Stop Time: 955  Time Calculation (min): 25 min        OT Therapeutic Procedures Time Entry  Therapeutic Exercise Time Entry: 25              Visit: 2  Patito   10/3/24-24  Problem List Items Addressed This Visit             ICD-10-CM    MS (multiple sclerosis) (Multi) G35    Upper extremity weakness - Primary R29.898       Assessment:   Patient demonstrated with lack of willingness to participate in exercises.  At the end of putty exercises patient stated \"I am done. Don't bother sending this putty home with me.\"   I again educated patient on what we discussed at evaluation about our plan to work on UE ROM and strengthening to increase his participation with ADLs at home.     Plan:   Continue with OT POC as tolerated    Subjective   Patient dropped off by caregiver.   Patient was only agreeable to 25 mins of 45 min session.    Pain:  0/10    Objective      1 MAS in R bicep and R digits  9 hole peg test:   L hand: 44 seconds  R hand: 52 seconds    Therapeutic Exercise:  25 mins  -Sitting at table top, completed BUE towel slides shoulder flexion 2 sets of 10  -Scapular retraction 20x  -Completed yellow putty exercises to B hands for strengthening: digit flexion, digit extension, digit adduction, tip to tip pinch, lateral pinch, digit abduction. Cues given for proper technique.      OP EDUCATION:   Towel slides and scapular retraction exercises-given in handout form  Patient declined taking home  putty exercises     Goals:  Active       OT Goals       HEP       Start:  10/03/24    Expected End:  25       Patient and patient's caregivers will demonstrate independence with established HEP, which will include UE strengthening, ROM and coordination exercises to maximize patient's functional abilities          ADLs       Start:  10/03/24    " Expected End:  01/01/25       Patient and patient's spouse will verbalize understanding of AE/DME and modifications to increase patient's participation in ADL tasks.

## 2024-10-17 ENCOUNTER — DOCUMENTATION (OUTPATIENT)
Dept: PHYSICAL THERAPY | Facility: CLINIC | Age: 79
End: 2024-10-17
Payer: MEDICARE

## 2024-10-17 ENCOUNTER — APPOINTMENT (OUTPATIENT)
Dept: PHYSICAL THERAPY | Facility: CLINIC | Age: 79
End: 2024-10-17
Payer: MEDICARE

## 2024-10-17 NOTE — PROGRESS NOTES
Physical Therapy                 Therapy Communication Note    Patient Name: Antonio Willis  MRN: [id number]  Department: [facility]  Room: Room/bed info not found  Today's Date: 10/17/2024     Discipline: Physical Therapy    Missed Time: Cancel    Comment: Patient's wife called to cancel appointment last minute as she had a fall and is not feeling well, therefore, unable to drive patient to appointment.

## 2024-10-18 ENCOUNTER — TELEPHONE (OUTPATIENT)
Dept: PULMONOLOGY | Facility: HOSPITAL | Age: 79
End: 2024-10-18
Payer: MEDICARE

## 2024-10-18 DIAGNOSIS — R06.00 DYSPNEA, UNSPECIFIED TYPE: ICD-10-CM

## 2024-10-18 RX ORDER — ALBUTEROL SULFATE 0.83 MG/ML
2.5 SOLUTION RESPIRATORY (INHALATION) 2 TIMES DAILY
Qty: 360 ML | Refills: 5 | Status: SHIPPED | OUTPATIENT
Start: 2024-10-18

## 2024-10-18 NOTE — TELEPHONE ENCOUNTER
Pt's wife called stating that pt hasn't received the albuterol nebulizer solution yet. She stated that [facility] can't fill prescription and it needs to get sent to a local pharmacy. Pt's wife requested it get sent to [facility]. Order placed and routed to Dr. Dumont to sign.

## 2024-10-22 ENCOUNTER — OFFICE VISIT (OUTPATIENT)
Dept: UROLOGY | Facility: HOSPITAL | Age: 79
End: 2024-10-22
Payer: MEDICARE

## 2024-10-22 DIAGNOSIS — N20.0 KIDNEY STONE: ICD-10-CM

## 2024-10-22 DIAGNOSIS — C61 ADENOCARCINOMA OF PROSTATE (MULTI): Primary | ICD-10-CM

## 2024-10-22 DIAGNOSIS — N13.30 HYDRONEPHROSIS, UNSPECIFIED HYDRONEPHROSIS TYPE: ICD-10-CM

## 2024-10-22 LAB
POC APPEARANCE, URINE: CLEAR
POC BILIRUBIN, URINE: NEGATIVE
POC BLOOD, URINE: ABNORMAL
POC COLOR, URINE: YELLOW
POC GLUCOSE, URINE: NEGATIVE MG/DL
POC KETONES, URINE: NEGATIVE MG/DL
POC LEUKOCYTES, URINE: ABNORMAL
POC NITRITE,URINE: NEGATIVE
POC PH, URINE: 5.5 PH
POC PROTEIN, URINE: ABNORMAL MG/DL
POC SPECIFIC GRAVITY, URINE: 1.02
POC UROBILINOGEN, URINE: 0.2 EU/DL

## 2024-10-22 PROCEDURE — 51798 US URINE CAPACITY MEASURE: CPT | Performed by: UROLOGY

## 2024-10-22 PROCEDURE — 99204 OFFICE O/P NEW MOD 45 MIN: CPT | Performed by: UROLOGY

## 2024-10-22 PROCEDURE — 81002 URINALYSIS NONAUTO W/O SCOPE: CPT | Performed by: UROLOGY

## 2024-10-22 PROCEDURE — G2211 COMPLEX E/M VISIT ADD ON: HCPCS | Performed by: UROLOGY

## 2024-10-22 PROCEDURE — 99214 OFFICE O/P EST MOD 30 MIN: CPT | Performed by: UROLOGY

## 2024-10-22 NOTE — PROGRESS NOTES
NAME:Antonio Willis  DATE: 10/22/2024               Subjective:   Chief complaint: Urinary incontinence     HPI:  79 y.o. male presenting for evaluation of Urinary incontinence.     Pt presents here today with his wife.  Has been seeing Dr. Olmstead in the past.  H/o kidney stones.  Known left hydro.  Follows with Dr. Higgins.  Recent JULITA showed severe left hydro and stones.  Pt with CKD    H/o PCa s.o radiation and hormone therapy.  Last PSA 0.3     HPI:   Erectile Dysfunction:  Able to obtain - No  Able to maintain - No  Pain - No  Curvature - No N/A  Libido - Bad  Prior treatments -N/A  Relationship status -   Sexual Partners - Female     Voiding Symptoms  Frequency: Q 1 -2hours   Urgency: No  Urge Incontinence: No  Nocturia: 4 times per night Sleep Disorder: No  Stream: moderate  Hesitancy: No  Straining: No  Intermittency: No  Sensation of Incomplete Emptying: Yes  Stress Incontinence: No  Pads:  Yes 1 during the day at night 2-3   Dysuria:  No  Gross Hematuria:  No  UTI:  No  Stones:  Yes- currently has stones according to last urologist.   Consumes  oz of fluid per day.   Yesmedications for his bladder in the past.  Has used:  Flomax 0.4mg daily     Nourodynamic testing in the past.     Bowel Function:   Pt has no constipation. BM's Q 1 days.     Past Medical History:   Diagnosis Date    Cardiac pacemaker     Implanted 10/17/2011 due to SSS    Chronic anemia     CKD (chronic kidney disease), stage IV (Multi)     Granuloma annulare     Hemiplegia, unspecified affecting right dominant side (Multi)     Right Hemiparesis from MS    History of GI bleed     UGI due to gastric and duodenal ulcer    History of prostate cancer     dx 02/13/2008; treated with hormone tx and external beam radiation    Hydronephrosis concurrent with and due to calculi of kidney and ureter     Recurrent episodes on the LEFT    Hyperkalemia     Due to CKD; followed by Dr [place] Higgins    Hyperlipidemia, unspecified     Multiple  sclerosis (Multi)     Dx 2011    Pyuria     Chronic    Recurrent kidney stones     Left side    Tremor, unspecified      Past Surgical History:   Procedure Laterality Date    BLADDER STONE REMOVAL  11/09/2007    Cystolithalopaxy    CYSTOSCOPY W/ LASER LITHOTRIPSY Left     12/3/20, 5/6/21, 5/20/21, 8/26/21, 8/24/23    EXTRACORPOREAL SHOCK WAVE LITHOTRIPSY Left     10/01/20, 10/29/20    HERNIA REPAIR Right 1993    Right inguinal hernia repair    IR  NEPHROSTOMY PLACEMENT  11/24/2021    IR  NEPHROSTOMY PLACEMENT 11/24/2021 [facility]    ORIF ANKLE FRACTURE Right 2003    PACEMAKER PLACEMENT  10/17/2011    PROSTATE BIOPSY  02/13/2008    + cancer    US GUIDED ABSCESS DRAIN  11/24/2021    US GUIDED ABSCESS DRAIN 11/24/2021 [facility]     Social History     Tobacco Use    Smoking status: Every Day     Types: Cigars     Passive exposure: Current    Smokeless tobacco: Not on file    Tobacco comments:     Has not smoked in the last 3-4 weeks per patient wife   Substance Use Topics    Alcohol use: Yes     Alcohol/week: 1.0 standard drink of alcohol     Types: 1 Glasses of wine per week     Comment: 2 month     Family History   Problem Relation Name Age of Onset    Leukemia Mother      Heart attack Father      Diabetes Father      Diabetes Sister      Tremor Sister      Leukemia Brother      Parkinsonism Father's Sister       Current Outpatient Medications on File Prior to Visit   Medication Sig Dispense Refill    acetaminophen (Tylenol) 325 mg tablet Take 2 tablets (650 mg) by mouth every 6 hours if needed.      albuterol 2.5 mg /3 mL (0.083 %) nebulizer solution Take 3 mL (2.5 mg) by nebulization 2 times a day. 360 mL 5    albuterol 90 mcg/actuation inhaler Inhale 2 puffs every 4 hours if needed for wheezing or shortness of breath (You may also use this 10-15 minutes prior to exertional activity as needed). 18 g 5    cholecalciferol (Vitamin D-3) 25 MCG (1000 UT) tablet Take 1 tablet (25 mcg) by mouth once daily.       famotidine (Pepcid) 20 mg tablet Take 1 tablet (20 mg) by mouth once daily at bedtime.      ferrous sulfate 325 (65 Fe) MG tablet Iron 325 (65 Fe) MG Oral Tablet   Refills: 0        Start : 20-Jul-2022   Active      multivit-iron-minerals-folic acid (Centrum Silver) 0.4 mg-300 mcg- 250 mcg tab Take 1 tablet by mouth once daily.      oxygen (O2) gas therapy Inhale 2 L/min continuously.      rosuvastatin (Crestor) 10 mg tablet Take 1 tablet (10 mg) by mouth once daily at bedtime. 90 tablet 3    tamsulosin (Flomax) 0.4 mg 24 hr capsule Take 1 capsule (0.4 mg) by mouth once daily at bedtime.      [DISCONTINUED] albuterol 2.5 mg /3 mL (0.083 %) nebulizer solution Take 3 mL (2.5 mg) by nebulization 2 times a day. 360 mL 5     No current facility-administered medications on file prior to visit.     Antonio has No Known Allergies.     Review of Systems    14 point ROS reviewed and discussed with the patient. Pertinent positives/negatives discussed in the History of Present Illness (HPI).      FH:  Prostate CA: Yes- self - radiation 10-12 years ago   Bladder CA: No  Kidney CA: No  Stones: No    Social History  Occupation:  Tob: Former- quit 6 weeks ago   Etoh: Yes- socially       Objective:     Physical Exam   1. Constitutional: NAD, Well-developed, Well-nourished  2. Respiratory: Unlabored breathing, no audible wheezes, no use of accessory muscles   3. Cardiovascular: No JVD, extremities perfused, no edema  4. Abdomen: Soft, non-tender, non-distended, no masses or hernia.  No CVA tenderness.    5. Skin: no visible lesions, plaque, rashes, jaundice  6. Neuro: Gait normal, no focal neurologic deficit  7. Psychiatric: Mood and affect normal and appropriate, alert and oriented    Labs  Lab Results   Component Value Date    PSA 0.3 06/13/2024     Lab Results   Component Value Date    GFRMALE 14 (A) 09/20/2023     Lab Results   Component Value Date    CREATININE 4.56 (H) 09/24/2024     Lab Results   Component Value Date    CHOL  114 08/29/2023     Lab Results   Component Value Date    HDL 38.4 (A) 08/29/2023     Lab Results   Component Value Date    CHHDL 3.0 08/29/2023     Lab Results   Component Value Date    LDLF 55 08/29/2023     Lab Results   Component Value Date    VLDL 21 08/29/2023     Lab Results   Component Value Date    TRIG 104 08/29/2023     Lab Results   Component Value Date    HCT 39.4 (L) 09/24/2024       Imaging  FINDINGS:  Abnormal examination. Severe left hydronephrosis persists.      Right kidney measures 11.1 cm. Left kidney measures 14 cm. Multiple  left-sided stones detected. The largest measures about 1.4 cm.      Cortical thinning seen in both kidneys.      Debris seen layering within the urinary bladder. Postvoid residual 31  cc. Prostate gland measures 4.1 x 4.2 cm.        IMPRESSION:  Severe obstructive nephropathy persists on the left. Left-sided renal  stones detected. If further characterization is needed, CT  examination is advised to better evaluate the obstructive features.  Cortical thinning of both kidneys.      Debris     PVR: 49cc    Assessment/Plan:   Antonio Willis presents with       1. Adenocarcinoma of prostate (Multi)    2. Kidney stone    3. Hydronephrosis, unspecified hydronephrosis type      Pt with severe left hydronephrosis.  Needs imaging to assess ureter and bladder.  Will get CT scan without contrast  CKD, unsure how much this kidney contributes to overall function, may need lasix renal scan    Lower Urinary Tract Symptoms (LUTS)    I educated the patient on relevant lower urinary tract anatomy and physiology with emphasis on differential diagnosis as well as contributing factors to the patient's lower urinary tract symptoms. I educated the patient on dietary and behavioral modifications pertinent to the patient's complaints. I recommended to avoid caffeine, alcohol, spicy and acidic oral intake and to regulate fluid intake and voiding (timed voiding, double voiding). I stressed the  importance of avoiding constipation and recommended stool softeners unless diarrhea present. We discussed limiting fluid intake at night and elevating legs prior to bedtime.     The mechanism of action as well as the risks, benefits, common side effects, and alternatives to all prescribed medications were discussed with the patient at length. The patient had the opportunity to ask questions and all questions were answered. I primarily discussed alpha blockers, 5ARIs, PDE5i, anticholinergics, and beta 3 agonist (mirabegron).  I explained that alpha blockers help decrease bladder outlet resistance with potential side effects to include retrograde ejaculation, rhinitis, and light headedness. I explained that 5 alpha reductase inhibitors can shrink the prostate up to 30%, can artificially decrease their PSA value by 50%, but take approximately 6-9 months to reach full efficacy and have potential side effects to include decreased libido, impotence and breast tenderness.

## 2024-10-22 NOTE — LETTER
October 22, 2024     North Higgins MD  [address] [facility]   [facility] [address]  [facility] [address]    Patient: Antonio Willis   YOB: 1945   Date of Visit: 10/22/2024       Dear Dr. North Higgins MD:    Thank you for referring Antonio Willis to me for evaluation. Below are my notes for this consultation.  If you have questions, please do not hesitate to call me. I look forward to following your patient along with you.       Sincerely,     Bobby Buckner MD      CC: No Recipients  ______________________________________________________________________________________    NAME:Antonio Willis  DATE: 10/22/2024               Subjective:   Chief complaint: Urinary incontinence     HPI:  79 y.o. male presenting for evaluation of Urinary incontinence.     Pt presents here today with his wife.  Has been seeing Dr. Olmstead in the past.  H/o kidney stones.  Known left hydro.  Follows with Dr. Higgins.  Recent JULITA showed severe left hydro and stones.  Pt with CKD    H/o PCa s.o radiation and hormone therapy.  Last PSA 0.3     HPI:   Erectile Dysfunction:  Able to obtain - No  Able to maintain - No  Pain - No  Curvature - No N/A  Libido - Bad  Prior treatments -N/A  Relationship status -   Sexual Partners - Female     Voiding Symptoms  Frequency: Q 1 -2hours   Urgency: No  Urge Incontinence: No  Nocturia: 4 times per night Sleep Disorder: No  Stream: moderate  Hesitancy: No  Straining: No  Intermittency: No  Sensation of Incomplete Emptying: Yes  Stress Incontinence: No  Pads:  Yes 1 during the day at night 2-3   Dysuria:  No  Gross Hematuria:  No  UTI:  No  Stones:  Yes- currently has stones according to last urologist.   Consumes  oz of fluid per day.   Yesmedications for his bladder in the past.  Has used:  Flomax 0.4mg daily     Nourodynamic testing in the past.     Bowel Function:   Pt has no constipation. BM's Q 1 days.     Past Medical History:   Diagnosis Date   • Cardiac pacemaker     Implanted 10/17/2011  due to SSS   • Chronic anemia    • CKD (chronic kidney disease), stage IV (Multi)    • Granuloma annulare    • Hemiplegia, unspecified affecting right dominant side (Multi)     Right Hemiparesis from MS   • History of GI bleed     UGI due to gastric and duodenal ulcer   • History of prostate cancer     dx 02/13/2008; treated with hormone tx and external beam radiation   • Hydronephrosis concurrent with and due to calculi of kidney and ureter     Recurrent episodes on the LEFT   • Hyperkalemia     Due to CKD; followed by Dr [place] Higgins   • Hyperlipidemia, unspecified    • Multiple sclerosis (Multi)     Dx 2011   • Pyuria     Chronic   • Recurrent kidney stones     Left side   • Tremor, unspecified      Past Surgical History:   Procedure Laterality Date   • BLADDER STONE REMOVAL  11/09/2007    Cystolithalopaxy   • CYSTOSCOPY W/ LASER LITHOTRIPSY Left     12/3/20, 5/6/21, 5/20/21, 8/26/21, 8/24/23   • EXTRACORPOREAL SHOCK WAVE LITHOTRIPSY Left     10/01/20, 10/29/20   • HERNIA REPAIR Right 1993    Right inguinal hernia repair   • IR  NEPHROSTOMY PLACEMENT  11/24/2021    IR  NEPHROSTOMY PLACEMENT 11/24/2021 [facility]   • ORIF ANKLE FRACTURE Right 2003   • PACEMAKER PLACEMENT  10/17/2011   • PROSTATE BIOPSY  02/13/2008    + cancer   • US GUIDED ABSCESS DRAIN  11/24/2021    US GUIDED ABSCESS DRAIN 11/24/2021 [facility]     Social History     Tobacco Use   • Smoking status: Every Day     Types: Cigars     Passive exposure: Current   • Smokeless tobacco: Not on file   • Tobacco comments:     Has not smoked in the last 3-4 weeks per patient wife   Substance Use Topics   • Alcohol use: Yes     Alcohol/week: 1.0 standard drink of alcohol     Types: 1 Glasses of wine per week     Comment: 2 month     Family History   Problem Relation Name Age of Onset   • Leukemia Mother     • Heart attack Father     • Diabetes Father     • Diabetes Sister     • Tremor Sister     • Leukemia Brother     • Parkinsonism Father's  Sister       Current Outpatient Medications on File Prior to Visit   Medication Sig Dispense Refill   • acetaminophen (Tylenol) 325 mg tablet Take 2 tablets (650 mg) by mouth every 6 hours if needed.     • albuterol 2.5 mg /3 mL (0.083 %) nebulizer solution Take 3 mL (2.5 mg) by nebulization 2 times a day. 360 mL 5   • albuterol 90 mcg/actuation inhaler Inhale 2 puffs every 4 hours if needed for wheezing or shortness of breath (You may also use this 10-15 minutes prior to exertional activity as needed). 18 g 5   • cholecalciferol (Vitamin D-3) 25 MCG (1000 UT) tablet Take 1 tablet (25 mcg) by mouth once daily.     • famotidine (Pepcid) 20 mg tablet Take 1 tablet (20 mg) by mouth once daily at bedtime.     • ferrous sulfate 325 (65 Fe) MG tablet Iron 325 (65 Fe) MG Oral Tablet   Refills: 0        Start : 20-Jul-2022   Active     • multivit-iron-minerals-folic acid (Centrum Silver) 0.4 mg-300 mcg- 250 mcg tab Take 1 tablet by mouth once daily.     • oxygen (O2) gas therapy Inhale 2 L/min continuously.     • rosuvastatin (Crestor) 10 mg tablet Take 1 tablet (10 mg) by mouth once daily at bedtime. 90 tablet 3   • tamsulosin (Flomax) 0.4 mg 24 hr capsule Take 1 capsule (0.4 mg) by mouth once daily at bedtime.     • [DISCONTINUED] albuterol 2.5 mg /3 mL (0.083 %) nebulizer solution Take 3 mL (2.5 mg) by nebulization 2 times a day. 360 mL 5     No current facility-administered medications on file prior to visit.     Antonio has No Known Allergies.     Review of Systems    14 point ROS reviewed and discussed with the patient. Pertinent positives/negatives discussed in the History of Present Illness (HPI).      FH:  Prostate CA: Yes- self - radiation 10-12 years ago   Bladder CA: No  Kidney CA: No  Stones: No    Social History  Occupation:  Tob: Former- quit 6 weeks ago   Etoh: Yes- socially       Objective:     Physical Exam   1. Constitutional: NAD, Well-developed, Well-nourished  2. Respiratory: Unlabored breathing, no  audible wheezes, no use of accessory muscles   3. Cardiovascular: No JVD, extremities perfused, no edema  4. Abdomen: Soft, non-tender, non-distended, no masses or hernia.  No CVA tenderness.    5. Skin: no visible lesions, plaque, rashes, jaundice  6. Neuro: Gait normal, no focal neurologic deficit  7. Psychiatric: Mood and affect normal and appropriate, alert and oriented    Labs  Lab Results   Component Value Date    PSA 0.3 06/13/2024     Lab Results   Component Value Date    GFRMALE 14 (A) 09/20/2023     Lab Results   Component Value Date    CREATININE 4.56 (H) 09/24/2024     Lab Results   Component Value Date    CHOL 114 08/29/2023     Lab Results   Component Value Date    HDL 38.4 (A) 08/29/2023     Lab Results   Component Value Date    CHHDL 3.0 08/29/2023     Lab Results   Component Value Date    LDLF 55 08/29/2023     Lab Results   Component Value Date    VLDL 21 08/29/2023     Lab Results   Component Value Date    TRIG 104 08/29/2023     Lab Results   Component Value Date    HCT 39.4 (L) 09/24/2024       Imaging  FINDINGS:  Abnormal examination. Severe left hydronephrosis persists.      Right kidney measures 11.1 cm. Left kidney measures 14 cm. Multiple  left-sided stones detected. The largest measures about 1.4 cm.      Cortical thinning seen in both kidneys.      Debris seen layering within the urinary bladder. Postvoid residual 31  cc. Prostate gland measures 4.1 x 4.2 cm.        IMPRESSION:  Severe obstructive nephropathy persists on the left. Left-sided renal  stones detected. If further characterization is needed, CT  examination is advised to better evaluate the obstructive features.  Cortical thinning of both kidneys.      Debris     PVR: 49cc    Assessment/Plan:   Antonio Willis presents with       1. Adenocarcinoma of prostate (Multi)    2. Kidney stone    3. Hydronephrosis, unspecified hydronephrosis type      Pt with severe left hydronephrosis.  Needs imaging to assess ureter and bladder.   Will get CT scan without contrast  CKD, unsure how much this kidney contributes to overall function, may need lasix renal scan    Lower Urinary Tract Symptoms (LUTS)    I educated the patient on relevant lower urinary tract anatomy and physiology with emphasis on differential diagnosis as well as contributing factors to the patient's lower urinary tract symptoms. I educated the patient on dietary and behavioral modifications pertinent to the patient's complaints. I recommended to avoid caffeine, alcohol, spicy and acidic oral intake and to regulate fluid intake and voiding (timed voiding, double voiding). I stressed the importance of avoiding constipation and recommended stool softeners unless diarrhea present. We discussed limiting fluid intake at night and elevating legs prior to bedtime.     The mechanism of action as well as the risks, benefits, common side effects, and alternatives to all prescribed medications were discussed with the patient at length. The patient had the opportunity to ask questions and all questions were answered. I primarily discussed alpha blockers, 5ARIs, PDE5i, anticholinergics, and beta 3 agonist (mirabegron).  I explained that alpha blockers help decrease bladder outlet resistance with potential side effects to include retrograde ejaculation, rhinitis, and light headedness. I explained that 5 alpha reductase inhibitors can shrink the prostate up to 30%, can artificially decrease their PSA value by 50%, but take approximately 6-9 months to reach full efficacy and have potential side effects to include decreased libido, impotence and breast tenderness.

## 2024-10-23 ENCOUNTER — TELEPHONE (OUTPATIENT)
Dept: CARDIOLOGY | Facility: HOSPITAL | Age: 79
End: 2024-10-23

## 2024-10-23 ENCOUNTER — TREATMENT (OUTPATIENT)
Dept: PHYSICAL THERAPY | Facility: CLINIC | Age: 79
End: 2024-10-23
Payer: MEDICARE

## 2024-10-23 DIAGNOSIS — R53.1 DECREASED STRENGTH, ENDURANCE, AND MOBILITY: ICD-10-CM

## 2024-10-23 DIAGNOSIS — R26.89 IMBALANCE: ICD-10-CM

## 2024-10-23 DIAGNOSIS — R68.89 DECREASED STRENGTH, ENDURANCE, AND MOBILITY: ICD-10-CM

## 2024-10-23 DIAGNOSIS — G35 MS (MULTIPLE SCLEROSIS) (MULTI): ICD-10-CM

## 2024-10-23 DIAGNOSIS — Z74.09 DECREASED STRENGTH, ENDURANCE, AND MOBILITY: ICD-10-CM

## 2024-10-23 DIAGNOSIS — R26.9 ABNORMALITY OF GAIT AND MOBILITY: ICD-10-CM

## 2024-10-23 PROCEDURE — 97110 THERAPEUTIC EXERCISES: CPT | Mod: GP

## 2024-10-23 PROCEDURE — 97530 THERAPEUTIC ACTIVITIES: CPT | Mod: GP

## 2024-10-23 NOTE — TELEPHONE ENCOUNTER
Called patient and spoke with wife giving results as below and recommendation to follow up with pulmonology.  Spouse verbalizes understanding.    ----- Message from Driss Miller sent at 10/18/2024  5:18 PM EDT -----  Normal left ventricular function.  No significant valve disease.  Moderately elevated pulmonary pressures in the setting of significant pulmonary disease.  Follow-up with pulmonology.

## 2024-10-23 NOTE — PROGRESS NOTES
"Physical Therapy    Physical Therapy Treatment    Patient Name: Antonio Willis  MRN: [id number]  Today's Date: 10/23/2024  Time Calculation  Start Time: 0937  Stop Time: 1015  Time Calculation (min): 38 min     PT Therapeutic Procedures Time Entry  Therapeutic Exercise Time Entry: 18  Therapeutic Activity Time Entry: 20       Visit number: 2  Insurance: [facility] Medicare    Plan of Care: 10/3/24 - 1/1/25  Primary Diagnosis: Abnormality of gait and mobility       Assessment:   Patient presents for first follow-up PT session this date. Patient tolerated all therapeutic activities fair with shortness of breath and oxygen desaturation into the 87-91%, PT titrated O2 to 4-5L in order to attain 94%. O2 was titrated from 2L to 3L at start to session as pt with history of desaturation on 2L NC with functional mobility. Of note, wife reported that he has gone to 70% SpO2 at home. Partial home exercises were reviewed and patient required min cues with review of home program exercises to ensure proper form and technique. Patient would continue to benefit from skilled PT to continue to improve strength, balance, gait, and overall functional mobility.    Plan: Continue per plan of care.        Current Problem  Problem List Items Addressed This Visit             ICD-10-CM    MS (multiple sclerosis) (Multi) G35     Other Visit Diagnoses         Codes    Abnormality of gait and mobility     R26.9    Decreased strength, endurance, and mobility     R53.1, Z74.09, R68.89    Imbalance     R26.89            Subjective    Patient states that he is well but feels more wobbly because of the MS. Patient states \"this is a pain\" re if he had any pain.     Precautions:    High Fall Risk    Pain   0/10, denies pain    Objective     Vitals at start of session:  SpO2: 91-92% on 2L NC    PT titrated O2 to 3L NC for all therapeutic activities and exercies as patient chronically demonstrates desaturation with exerciese or functional mobility    Vitals " at seated rest following functional ambulation: 94%SpO2 on 3L NC      Treatments:  THERAPEUTIC EXERCISE x 18 minutes  - seated marches 2 x 10  - seated LAQ 2 x 15  - seated hip adduction with ball 3 x 10  - seated hip abduction with yellow TB   SpO2: 94-92% on 3L NC throughout seated there. Ex     THERAPEUTIC ACTIVITY x 20 minutes  - functional ambulation with RW at [facility] with therapist carrying portable O2 tank x 12'   - Vitals during functional ambulation: 91-90% on 3L NC  - Patient with 90-86% SpO2 following standing rest break, chair brought over and pt required prolonged seated rest break and O2 titrated to 5L NC to attain and maintain 94-95% SpO2.   - functional ambulation with RW at [facility] with therapist carrying portable O2 tank x 8'   - Vitals: 92-90% during ambulation  - seated rest: 87-89% -> O2 titrated to 4-5L to allow for improved oxygenation and to attain/maintain 93-95% SpO2.     OP EDUCATION: Educated patient on SpO2 need to be at 92% with activity to ensure good oxygenation     HOME EXERCISE PROGRAM:  Access Code: [email or web address]  URL: [email or web address]  Date: 10/03/2024  Prepared by: Salma Borjas     Program Notes  Perform with Wife or Aide     Exercises  - Seated Hamstring Stretch with Chair  - 1 x daily - 7 x weekly - 3 sets - 30 sec hold  - Sit to Stand with Armchair  - 1 x daily - 7 x weekly - 3 sets - 10 reps  - Seated Scapular Retraction  - 1 x daily - 7 x weekly - 3 sets - 10 reps  - Seated March  - 1 x daily - 7 x weekly - 3 sets - 10 reps  - Seated Long Arc Quad  - 1 x daily - 7 x weekly - 3 sets - 10 reps    Goals:  Active       PT Problem       Patient will perform TUG with RW at Supervision in </= 40 sec to demonstrate decreased fall risk       Start:  10/03/24    Expected End:  01/01/25            Patient will perform 7 stands with arm rests on 30 sec STS test to demonstrate improved functional LE strength to assist with gait, stair negotiation, and functional transfers       Start:   10/03/24    Expected End:  01/01/25            Patient will improve GODWIN balance test by >/= 3 pts to meet MCID for MS population and demonstrate improved dynamic balance        Start:  10/03/24    Expected End:  01/01/25            Patient will improve ABC Scale by 10% to demonstrate improved balance confidence        Start:  10/03/24    Expected End:  01/01/25            Patient will be Modified Indep with home exercise program to demonstrate compliance, improved activity tolerance, and self-management of diagnosis       Start:  10/03/24    Expected End:  01/01/25            Patient Stated Goal - improve strength and tolerance to activity        Start:  10/03/24    Expected End:  01/01/25                 No lymphadedenopathy No lymphadedenopathy

## 2024-10-24 ENCOUNTER — TELEPHONE (OUTPATIENT)
Dept: PULMONOLOGY | Facility: HOSPITAL | Age: 79
End: 2024-10-24
Payer: MEDICARE

## 2024-10-24 ENCOUNTER — DOCUMENTATION (OUTPATIENT)
Dept: PHYSICAL THERAPY | Facility: CLINIC | Age: 79
End: 2024-10-24
Payer: MEDICARE

## 2024-10-24 ENCOUNTER — APPOINTMENT (OUTPATIENT)
Dept: PHYSICAL THERAPY | Facility: CLINIC | Age: 79
End: 2024-10-24
Payer: MEDICARE

## 2024-10-24 NOTE — TELEPHONE ENCOUNTER
Returned pt's call and spoke with his wife. She stated that the pt needs a nebulizer machine, but they have the medication. Order for nebulizer machine and supplies was sent to [facility] via [place]. Confirmation of order was received. Pt's wife also stated that Dr. Miller wanted Dr. Dumont to look at the results of pt's TTE. Dr. Dumont was notified. Pt's wife mentioned that they will be leaving next month for [place]. She bought a portable oxygen concentrator for traveling. She was informed most airlines require a medical necessity form to be completed by the provider. She verbalized understanding and stated that they are flying with [employer]. This form is needed with this airline. Once completed and signed by Dr. Dumont, will mail the form to pt. Pt's wife verbalized understanding.

## 2024-10-24 NOTE — PROGRESS NOTES
Physical Therapy                 Therapy Communication Note    Patient Name: Antonio Willis  MRN: [id number]  Department: [facility]  Room: Room/bed info not found  Today's Date: 10/24/2024     Discipline: Physical Therapy      Missed Time: Cancel    Comment: patient cancelled appt

## 2024-10-25 ENCOUNTER — APPOINTMENT (OUTPATIENT)
Dept: OCCUPATIONAL THERAPY | Facility: CLINIC | Age: 79
End: 2024-10-25
Payer: MEDICARE

## 2024-10-25 ENCOUNTER — TELEPHONE (OUTPATIENT)
Dept: CARDIOLOGY | Facility: HOSPITAL | Age: 79
End: 2024-10-25
Payer: MEDICARE

## 2024-10-25 NOTE — TELEPHONE ENCOUNTER
----- Message from Driss Miller sent at 10/18/2024  5:18 PM EDT -----  Normal left ventricular function.  No significant valve disease.  Moderately elevated pulmonary pressures in the setting of significant pulmonary disease.  Follow-up with pulmonology.

## 2024-10-25 NOTE — TELEPHONE ENCOUNTER
Spoke with pt and spouse about Echo results per Dr. Miller's notes.  Pt and spouse encouraged to follow up with pulmonology (per spouse, Dr. Rush), for any follow up plan of care. Spouse verbalizes understanding.  No other questions or concerns received.

## 2024-10-28 ENCOUNTER — HOSPITAL ENCOUNTER (OUTPATIENT)
Dept: CARDIOLOGY | Facility: CLINIC | Age: 79
Discharge: HOME | End: 2024-10-28
Payer: MEDICARE

## 2024-10-28 ENCOUNTER — APPOINTMENT (OUTPATIENT)
Dept: PHYSICAL THERAPY | Facility: CLINIC | Age: 79
End: 2024-10-28
Payer: MEDICARE

## 2024-10-28 DIAGNOSIS — Z95.0 PRESENCE OF CARDIAC PACEMAKER: ICD-10-CM

## 2024-10-28 DIAGNOSIS — I49.5 SICK SINUS SYNDROME (MULTI): ICD-10-CM

## 2024-10-30 ENCOUNTER — TREATMENT (OUTPATIENT)
Dept: PHYSICAL THERAPY | Facility: CLINIC | Age: 79
End: 2024-10-30
Payer: MEDICARE

## 2024-10-30 DIAGNOSIS — G35 MS (MULTIPLE SCLEROSIS) (MULTI): ICD-10-CM

## 2024-10-30 DIAGNOSIS — R68.89 DECREASED STRENGTH, ENDURANCE, AND MOBILITY: ICD-10-CM

## 2024-10-30 DIAGNOSIS — Z74.09 DECREASED STRENGTH, ENDURANCE, AND MOBILITY: ICD-10-CM

## 2024-10-30 DIAGNOSIS — R26.89 IMBALANCE: ICD-10-CM

## 2024-10-30 DIAGNOSIS — R26.9 ABNORMALITY OF GAIT AND MOBILITY: ICD-10-CM

## 2024-10-30 DIAGNOSIS — R53.1 DECREASED STRENGTH, ENDURANCE, AND MOBILITY: ICD-10-CM

## 2024-10-30 PROCEDURE — 97530 THERAPEUTIC ACTIVITIES: CPT | Mod: GP

## 2024-10-30 PROCEDURE — 97110 THERAPEUTIC EXERCISES: CPT | Mod: GP

## 2024-10-31 ENCOUNTER — APPOINTMENT (OUTPATIENT)
Dept: OCCUPATIONAL THERAPY | Facility: CLINIC | Age: 79
End: 2024-10-31
Payer: MEDICARE

## 2024-10-31 ENCOUNTER — LAB (OUTPATIENT)
Dept: LAB | Facility: LAB | Age: 79
End: 2024-10-31
Payer: MEDICARE

## 2024-10-31 DIAGNOSIS — N18.5 CHRONIC KIDNEY DISEASE, STAGE 5 (MULTI): Primary | ICD-10-CM

## 2024-10-31 DIAGNOSIS — E78.00 PURE HYPERCHOLESTEROLEMIA: ICD-10-CM

## 2024-10-31 DIAGNOSIS — N13.8 OTHER OBSTRUCTIVE AND REFLUX UROPATHY: ICD-10-CM

## 2024-10-31 LAB
ALBUMIN SERPL BCP-MCNC: 3.4 G/DL (ref 3.4–5)
ANION GAP SERPL CALC-SCNC: 11 MMOL/L (ref 10–20)
BUN SERPL-MCNC: 82 MG/DL (ref 6–23)
CALCIUM SERPL-MCNC: 9 MG/DL (ref 8.6–10.3)
CHLORIDE SERPL-SCNC: 121 MMOL/L (ref 98–107)
CHOLEST SERPL-MCNC: 105 MG/DL (ref 0–199)
CHOLESTEROL/HDL RATIO: 2.2
CO2 SERPL-SCNC: 20 MMOL/L (ref 21–32)
CREAT SERPL-MCNC: 4.18 MG/DL (ref 0.5–1.3)
EGFRCR SERPLBLD CKD-EPI 2021: 14 ML/MIN/1.73M*2
GLUCOSE SERPL-MCNC: 80 MG/DL (ref 74–99)
HDLC SERPL-MCNC: 46.8 MG/DL
LDLC SERPL CALC-MCNC: 40 MG/DL
NON HDL CHOLESTEROL: 58 MG/DL (ref 0–149)
PHOSPHATE SERPL-MCNC: 3.6 MG/DL (ref 2.5–4.9)
POTASSIUM SERPL-SCNC: 4.8 MMOL/L (ref 3.5–5.3)
SODIUM SERPL-SCNC: 147 MMOL/L (ref 136–145)
TRIGL SERPL-MCNC: 89 MG/DL (ref 0–149)
VLDL: 18 MG/DL (ref 0–40)

## 2024-10-31 PROCEDURE — 36415 COLL VENOUS BLD VENIPUNCTURE: CPT

## 2024-10-31 PROCEDURE — 80069 RENAL FUNCTION PANEL: CPT

## 2024-10-31 PROCEDURE — 80061 LIPID PANEL: CPT

## 2024-11-04 ENCOUNTER — APPOINTMENT (OUTPATIENT)
Dept: PHYSICAL THERAPY | Facility: CLINIC | Age: 79
End: 2024-11-04
Payer: MEDICARE

## 2024-11-04 ENCOUNTER — TELEPHONE (OUTPATIENT)
Dept: PULMONOLOGY | Facility: HOSPITAL | Age: 79
End: 2024-11-04
Payer: MEDICARE

## 2024-11-04 NOTE — TELEPHONE ENCOUNTER
Returned call to pt's wife regarding his oxygen. She stated that when pt gets up to walk, his spO2 drops into the 70s. He is currently on 2L oxygen and mostly uses an electronic wheelchair to get around. Pt's wife was advised to increase pt's oxygen to 3-4L with ambulation to help maintain oxygen levels in the 90s. She verbalized understanding. Dr. Dumont was notified. She also mentioned that they will be leaving the state for the winter and the DME needs oxygen order and supporting documents. Pt's wife provided the contact information for [employer], which is [phone]. This nurse called DME to verify information needed to supply pt with oxygen while in [place]. They stated that the oxygen prescription is all that is needed since they don't go through insurance companies. Spoke with pt's wife to verify she is willing to pay out of pocket. She confirmed this is the plan. Order for home oxygen concentrator was faxed to [facility] at [phone].

## 2024-11-05 ENCOUNTER — APPOINTMENT (OUTPATIENT)
Dept: RADIOLOGY | Facility: HOSPITAL | Age: 79
End: 2024-11-05
Payer: MEDICARE

## 2024-11-05 ENCOUNTER — APPOINTMENT (OUTPATIENT)
Dept: NEUROLOGY | Facility: CLINIC | Age: 79
End: 2024-11-05
Payer: MEDICARE

## 2024-11-05 ENCOUNTER — APPOINTMENT (OUTPATIENT)
Dept: CARDIOLOGY | Facility: HOSPITAL | Age: 79
End: 2024-11-05
Payer: MEDICARE

## 2024-11-05 ENCOUNTER — TELEPHONE (OUTPATIENT)
Dept: PULMONOLOGY | Facility: HOSPITAL | Age: 79
End: 2024-11-05

## 2024-11-05 ENCOUNTER — HOSPITAL ENCOUNTER (INPATIENT)
Facility: HOSPITAL | Age: 79
End: 2024-11-05
Attending: EMERGENCY MEDICINE | Admitting: INTERNAL MEDICINE
Payer: MEDICARE

## 2024-11-05 DIAGNOSIS — Z95.0 PACEMAKER: ICD-10-CM

## 2024-11-05 DIAGNOSIS — N18.9 CHRONIC KIDNEY DISEASE, UNSPECIFIED CKD STAGE: ICD-10-CM

## 2024-11-05 DIAGNOSIS — J18.9 PNEUMONIA OF RIGHT UPPER LOBE DUE TO INFECTIOUS ORGANISM: Primary | ICD-10-CM

## 2024-11-05 DIAGNOSIS — J96.21 ACUTE ON CHRONIC HYPOXIC RESPIRATORY FAILURE (MULTI): ICD-10-CM

## 2024-11-05 DIAGNOSIS — G35 MS (MULTIPLE SCLEROSIS) (MULTI): ICD-10-CM

## 2024-11-05 DIAGNOSIS — G93.41 ACUTE METABOLIC ENCEPHALOPATHY: ICD-10-CM

## 2024-11-05 DIAGNOSIS — K92.1 MELENA: ICD-10-CM

## 2024-11-05 DIAGNOSIS — J84.9 INTERSTITIAL LUNG DISEASE (MULTI): ICD-10-CM

## 2024-11-05 DIAGNOSIS — E16.2 HYPOGLYCEMIA: ICD-10-CM

## 2024-11-05 LAB
ALBUMIN SERPL BCP-MCNC: 3.7 G/DL (ref 3.4–5)
ALP SERPL-CCNC: 293 U/L (ref 33–136)
ALT SERPL W P-5'-P-CCNC: 188 U/L (ref 10–52)
ANION GAP SERPL CALC-SCNC: 13 MMOL/L (ref 10–20)
AST SERPL W P-5'-P-CCNC: 130 U/L (ref 9–39)
BASOPHILS # BLD AUTO: 0.03 X10*3/UL (ref 0–0.1)
BASOPHILS NFR BLD AUTO: 0.3 %
BILIRUB SERPL-MCNC: 0.6 MG/DL (ref 0–1.2)
BUN SERPL-MCNC: 87 MG/DL (ref 6–23)
CALCIUM SERPL-MCNC: 9.9 MG/DL (ref 8.6–10.3)
CARDIAC TROPONIN I PNL SERPL HS: 17 NG/L (ref 0–20)
CHLORIDE SERPL-SCNC: 122 MMOL/L (ref 98–107)
CO2 SERPL-SCNC: 19 MMOL/L (ref 21–32)
CREAT SERPL-MCNC: 4.98 MG/DL (ref 0.5–1.3)
EGFRCR SERPLBLD CKD-EPI 2021: 11 ML/MIN/1.73M*2
EOSINOPHIL # BLD AUTO: 0.32 X10*3/UL (ref 0–0.4)
EOSINOPHIL NFR BLD AUTO: 3.7 %
ERYTHROCYTE [DISTWIDTH] IN BLOOD BY AUTOMATED COUNT: 18.9 % (ref 11.5–14.5)
FLUAV RNA RESP QL NAA+PROBE: NOT DETECTED
FLUBV RNA RESP QL NAA+PROBE: NOT DETECTED
GLUCOSE SERPL-MCNC: 56 MG/DL (ref 74–99)
HCT VFR BLD AUTO: 37.8 % (ref 41–52)
HGB BLD-MCNC: 11.8 G/DL (ref 13.5–17.5)
IMM GRANULOCYTES # BLD AUTO: 0.06 X10*3/UL (ref 0–0.5)
IMM GRANULOCYTES NFR BLD AUTO: 0.7 % (ref 0–0.9)
LYMPHOCYTES # BLD AUTO: 1.02 X10*3/UL (ref 0.8–3)
LYMPHOCYTES NFR BLD AUTO: 11.7 %
MCH RBC QN AUTO: 32.6 PG (ref 26–34)
MCHC RBC AUTO-ENTMCNC: 31.2 G/DL (ref 32–36)
MCV RBC AUTO: 104 FL (ref 80–100)
MONOCYTES # BLD AUTO: 0.54 X10*3/UL (ref 0.05–0.8)
MONOCYTES NFR BLD AUTO: 6.2 %
NEUTROPHILS # BLD AUTO: 6.78 X10*3/UL (ref 1.6–5.5)
NEUTROPHILS NFR BLD AUTO: 77.4 %
NRBC BLD-RTO: 0 /100 WBCS (ref 0–0)
PLATELET # BLD AUTO: 117 X10*3/UL (ref 150–450)
POTASSIUM SERPL-SCNC: 5.6 MMOL/L (ref 3.5–5.3)
PROT SERPL-MCNC: 7.6 G/DL (ref 6.4–8.2)
RBC # BLD AUTO: 3.62 X10*6/UL (ref 4.5–5.9)
SARS-COV-2 RNA RESP QL NAA+PROBE: NOT DETECTED
SODIUM SERPL-SCNC: 148 MMOL/L (ref 136–145)
WBC # BLD AUTO: 8.8 X10*3/UL (ref 4.4–11.3)

## 2024-11-05 PROCEDURE — 87636 SARSCOV2 & INF A&B AMP PRB: CPT | Performed by: EMERGENCY MEDICINE

## 2024-11-05 PROCEDURE — 2500000004 HC RX 250 GENERAL PHARMACY W/ HCPCS (ALT 636 FOR OP/ED): Performed by: INTERNAL MEDICINE

## 2024-11-05 PROCEDURE — 84484 ASSAY OF TROPONIN QUANT: CPT | Performed by: PHYSICIAN ASSISTANT

## 2024-11-05 PROCEDURE — 2500000005 HC RX 250 GENERAL PHARMACY W/O HCPCS: Performed by: INTERNAL MEDICINE

## 2024-11-05 PROCEDURE — 80053 COMPREHEN METABOLIC PANEL: CPT | Performed by: PHYSICIAN ASSISTANT

## 2024-11-05 PROCEDURE — 94640 AIRWAY INHALATION TREATMENT: CPT

## 2024-11-05 PROCEDURE — 71046 X-RAY EXAM CHEST 2 VIEWS: CPT

## 2024-11-05 PROCEDURE — 83880 ASSAY OF NATRIURETIC PEPTIDE: CPT | Performed by: PHYSICIAN ASSISTANT

## 2024-11-05 PROCEDURE — 36415 COLL VENOUS BLD VENIPUNCTURE: CPT | Performed by: PHYSICIAN ASSISTANT

## 2024-11-05 PROCEDURE — 2500000002 HC RX 250 W HCPCS SELF ADMINISTERED DRUGS (ALT 637 FOR MEDICARE OP, ALT 636 FOR OP/ED): Performed by: INTERNAL MEDICINE

## 2024-11-05 PROCEDURE — 2500000005 HC RX 250 GENERAL PHARMACY W/O HCPCS: Performed by: EMERGENCY MEDICINE

## 2024-11-05 PROCEDURE — 2500000001 HC RX 250 WO HCPCS SELF ADMINISTERED DRUGS (ALT 637 FOR MEDICARE OP): Performed by: INTERNAL MEDICINE

## 2024-11-05 PROCEDURE — 1100000001 HC PRIVATE ROOM DAILY

## 2024-11-05 PROCEDURE — 2500000004 HC RX 250 GENERAL PHARMACY W/ HCPCS (ALT 636 FOR OP/ED): Performed by: EMERGENCY MEDICINE

## 2024-11-05 PROCEDURE — 93005 ELECTROCARDIOGRAM TRACING: CPT

## 2024-11-05 PROCEDURE — 96365 THER/PROPH/DIAG IV INF INIT: CPT

## 2024-11-05 PROCEDURE — 2500000002 HC RX 250 W HCPCS SELF ADMINISTERED DRUGS (ALT 637 FOR MEDICARE OP, ALT 636 FOR OP/ED): Performed by: EMERGENCY MEDICINE

## 2024-11-05 PROCEDURE — 85025 COMPLETE CBC W/AUTO DIFF WBC: CPT | Performed by: PHYSICIAN ASSISTANT

## 2024-11-05 PROCEDURE — 71046 X-RAY EXAM CHEST 2 VIEWS: CPT | Performed by: STUDENT IN AN ORGANIZED HEALTH CARE EDUCATION/TRAINING PROGRAM

## 2024-11-05 PROCEDURE — 96375 TX/PRO/DX INJ NEW DRUG ADDON: CPT

## 2024-11-05 PROCEDURE — 99285 EMERGENCY DEPT VISIT HI MDM: CPT | Mod: 25

## 2024-11-05 RX ORDER — IPRATROPIUM BROMIDE AND ALBUTEROL SULFATE 2.5; .5 MG/3ML; MG/3ML
3 SOLUTION RESPIRATORY (INHALATION) 4 TIMES DAILY PRN
Status: DISCONTINUED | OUTPATIENT
Start: 2024-11-05 | End: 2024-11-05

## 2024-11-05 RX ORDER — SODIUM BICARBONATE 650 MG/1
650 TABLET ORAL 2 TIMES DAILY
Status: DISCONTINUED | OUTPATIENT
Start: 2024-11-05 | End: 2024-11-10

## 2024-11-05 RX ORDER — ONDANSETRON HYDROCHLORIDE 2 MG/ML
4 INJECTION, SOLUTION INTRAVENOUS EVERY 8 HOURS PRN
Status: DISCONTINUED | OUTPATIENT
Start: 2024-11-05 | End: 2024-11-13 | Stop reason: HOSPADM

## 2024-11-05 RX ORDER — ROSUVASTATIN CALCIUM 10 MG/1
10 TABLET, COATED ORAL NIGHTLY
Status: DISCONTINUED | OUTPATIENT
Start: 2024-11-05 | End: 2024-11-13

## 2024-11-05 RX ORDER — HEPARIN SODIUM 5000 [USP'U]/ML
5000 INJECTION, SOLUTION INTRAVENOUS; SUBCUTANEOUS EVERY 8 HOURS SCHEDULED
Status: DISCONTINUED | OUTPATIENT
Start: 2024-11-05 | End: 2024-11-13 | Stop reason: HOSPADM

## 2024-11-05 RX ORDER — IPRATROPIUM BROMIDE AND ALBUTEROL SULFATE 2.5; .5 MG/3ML; MG/3ML
3 SOLUTION RESPIRATORY (INHALATION) ONCE
Status: COMPLETED | OUTPATIENT
Start: 2024-11-05 | End: 2024-11-05

## 2024-11-05 RX ORDER — CHOLECALCIFEROL (VITAMIN D3) 25 MCG
1000 TABLET ORAL DAILY
Status: DISCONTINUED | OUTPATIENT
Start: 2024-11-06 | End: 2024-11-13 | Stop reason: HOSPADM

## 2024-11-05 RX ORDER — IPRATROPIUM BROMIDE AND ALBUTEROL SULFATE 2.5; .5 MG/3ML; MG/3ML
3 SOLUTION RESPIRATORY (INHALATION)
Status: DISCONTINUED | OUTPATIENT
Start: 2024-11-06 | End: 2024-11-06

## 2024-11-05 RX ORDER — IPRATROPIUM BROMIDE AND ALBUTEROL SULFATE 2.5; .5 MG/3ML; MG/3ML
3 SOLUTION RESPIRATORY (INHALATION) EVERY 2 HOUR PRN
Status: DISCONTINUED | OUTPATIENT
Start: 2024-11-05 | End: 2024-11-13

## 2024-11-05 RX ORDER — TAMSULOSIN HYDROCHLORIDE 0.4 MG/1
0.4 CAPSULE ORAL NIGHTLY
Status: DISCONTINUED | OUTPATIENT
Start: 2024-11-05 | End: 2024-11-13 | Stop reason: HOSPADM

## 2024-11-05 RX ORDER — POLYETHYLENE GLYCOL 3350 17 G/17G
17 POWDER, FOR SOLUTION ORAL DAILY
Status: DISCONTINUED | OUTPATIENT
Start: 2024-11-06 | End: 2024-11-13 | Stop reason: HOSPADM

## 2024-11-05 RX ORDER — ONDANSETRON 4 MG/1
4 TABLET, FILM COATED ORAL EVERY 8 HOURS PRN
Status: DISCONTINUED | OUTPATIENT
Start: 2024-11-05 | End: 2024-11-13 | Stop reason: HOSPADM

## 2024-11-05 RX ORDER — FAMOTIDINE 20 MG/1
10 TABLET, FILM COATED ORAL
Status: DISCONTINUED | OUTPATIENT
Start: 2024-11-05 | End: 2024-11-13 | Stop reason: HOSPADM

## 2024-11-05 RX ORDER — ACETAMINOPHEN 325 MG/1
650 TABLET ORAL EVERY 6 HOURS PRN
Status: DISCONTINUED | OUTPATIENT
Start: 2024-11-05 | End: 2024-11-13 | Stop reason: HOSPADM

## 2024-11-05 RX ADMIN — CEFTRIAXONE 2 G: 2 INJECTION, POWDER, FOR SOLUTION INTRAMUSCULAR; INTRAVENOUS at 21:14

## 2024-11-05 RX ADMIN — Medication 2 L/MIN: at 22:13

## 2024-11-05 RX ADMIN — IPRATROPIUM BROMIDE AND ALBUTEROL SULFATE 3 ML: 2.5; .5 SOLUTION RESPIRATORY (INHALATION) at 20:59

## 2024-11-05 RX ADMIN — AZITHROMYCIN MONOHYDRATE 500 MG: 500 INJECTION, POWDER, LYOPHILIZED, FOR SOLUTION INTRAVENOUS at 21:13

## 2024-11-05 RX ADMIN — HEPARIN SODIUM 5000 UNITS: 5000 INJECTION, SOLUTION INTRAVENOUS; SUBCUTANEOUS at 23:08

## 2024-11-05 RX ADMIN — TAMSULOSIN HYDROCHLORIDE 0.4 MG: 0.4 CAPSULE ORAL at 23:08

## 2024-11-05 RX ADMIN — FAMOTIDINE 10 MG: 20 TABLET, FILM COATED ORAL at 23:08

## 2024-11-05 RX ADMIN — ROSUVASTATIN CALCIUM 10 MG: 10 TABLET, FILM COATED ORAL at 23:11

## 2024-11-05 RX ADMIN — SODIUM ZIRCONIUM CYCLOSILICATE 10 G: 10 POWDER, FOR SUSPENSION ORAL at 23:07

## 2024-11-05 RX ADMIN — SODIUM BICARBONATE 650 MG: 650 TABLET ORAL at 23:12

## 2024-11-05 RX ADMIN — Medication 3 L/MIN: at 21:31

## 2024-11-05 SDOH — SOCIAL STABILITY: SOCIAL INSECURITY: ARE THERE ANY APPARENT SIGNS OF INJURIES/BEHAVIORS THAT COULD BE RELATED TO ABUSE/NEGLECT?: NO

## 2024-11-05 SDOH — ECONOMIC STABILITY: FOOD INSECURITY: WITHIN THE PAST 12 MONTHS, THE FOOD YOU BOUGHT JUST DIDN'T LAST AND YOU DIDN'T HAVE MONEY TO GET MORE.: NEVER TRUE

## 2024-11-05 SDOH — SOCIAL STABILITY: SOCIAL INSECURITY: WITHIN THE LAST YEAR, HAVE YOU BEEN HUMILIATED OR EMOTIONALLY ABUSED IN OTHER WAYS BY YOUR PARTNER OR EX-PARTNER?: NO

## 2024-11-05 SDOH — ECONOMIC STABILITY: FOOD INSECURITY: HOW HARD IS IT FOR YOU TO PAY FOR THE VERY BASICS LIKE FOOD, HOUSING, MEDICAL CARE, AND HEATING?: NOT HARD AT ALL

## 2024-11-05 SDOH — SOCIAL STABILITY: SOCIAL INSECURITY: WITHIN THE LAST YEAR, HAVE YOU BEEN AFRAID OF YOUR PARTNER OR EX-PARTNER?: NO

## 2024-11-05 SDOH — ECONOMIC STABILITY: HOUSING INSECURITY: IN THE PAST 12 MONTHS, HOW MANY TIMES HAVE YOU MOVED WHERE YOU WERE LIVING?: 0

## 2024-11-05 SDOH — SOCIAL STABILITY: SOCIAL INSECURITY
WITHIN THE LAST YEAR, HAVE YOU BEEN RAPED OR FORCED TO HAVE ANY KIND OF SEXUAL ACTIVITY BY YOUR PARTNER OR EX-PARTNER?: NO

## 2024-11-05 SDOH — ECONOMIC STABILITY: INCOME INSECURITY: IN THE PAST 12 MONTHS HAS THE ELECTRIC, GAS, OIL, OR WATER COMPANY THREATENED TO SHUT OFF SERVICES IN YOUR HOME?: NO

## 2024-11-05 SDOH — SOCIAL STABILITY: SOCIAL INSECURITY: ABUSE: ADULT

## 2024-11-05 SDOH — ECONOMIC STABILITY: FOOD INSECURITY: WITHIN THE PAST 12 MONTHS, YOU WORRIED THAT YOUR FOOD WOULD RUN OUT BEFORE YOU GOT THE MONEY TO BUY MORE.: NEVER TRUE

## 2024-11-05 SDOH — SOCIAL STABILITY: SOCIAL INSECURITY: WERE YOU ABLE TO COMPLETE ALL THE BEHAVIORAL HEALTH SCREENINGS?: YES

## 2024-11-05 SDOH — SOCIAL STABILITY: SOCIAL INSECURITY: DO YOU FEEL ANYONE HAS EXPLOITED OR TAKEN ADVANTAGE OF YOU FINANCIALLY OR OF YOUR PERSONAL PROPERTY?: NO

## 2024-11-05 SDOH — ECONOMIC STABILITY: HOUSING INSECURITY: IN THE LAST 12 MONTHS, WAS THERE A TIME WHEN YOU WERE NOT ABLE TO PAY THE MORTGAGE OR RENT ON TIME?: NO

## 2024-11-05 SDOH — SOCIAL STABILITY: SOCIAL INSECURITY: HAS ANYONE EVER THREATENED TO HURT YOUR FAMILY OR YOUR PETS?: NO

## 2024-11-05 SDOH — ECONOMIC STABILITY: HOUSING INSECURITY: AT ANY TIME IN THE PAST 12 MONTHS, WERE YOU HOMELESS OR LIVING IN A SHELTER (INCLUDING NOW)?: NO

## 2024-11-05 SDOH — SOCIAL STABILITY: SOCIAL INSECURITY
WITHIN THE LAST YEAR, HAVE YOU BEEN KICKED, HIT, SLAPPED, OR OTHERWISE PHYSICALLY HURT BY YOUR PARTNER OR EX-PARTNER?: NO

## 2024-11-05 SDOH — SOCIAL STABILITY: SOCIAL INSECURITY: DO YOU FEEL UNSAFE GOING BACK TO THE PLACE WHERE YOU ARE LIVING?: NO

## 2024-11-05 SDOH — ECONOMIC STABILITY: TRANSPORTATION INSECURITY: IN THE PAST 12 MONTHS, HAS LACK OF TRANSPORTATION KEPT YOU FROM MEDICAL APPOINTMENTS OR FROM GETTING MEDICATIONS?: NO

## 2024-11-05 SDOH — SOCIAL STABILITY: SOCIAL INSECURITY: HAVE YOU HAD THOUGHTS OF HARMING ANYONE ELSE?: NO

## 2024-11-05 SDOH — SOCIAL STABILITY: SOCIAL INSECURITY: ARE YOU OR HAVE YOU BEEN THREATENED OR ABUSED PHYSICALLY, EMOTIONALLY, OR SEXUALLY BY ANYONE?: NO

## 2024-11-05 SDOH — SOCIAL STABILITY: SOCIAL INSECURITY: DOES ANYONE TRY TO KEEP YOU FROM HAVING/CONTACTING OTHER FRIENDS OR DOING THINGS OUTSIDE YOUR HOME?: NO

## 2024-11-05 ASSESSMENT — COGNITIVE AND FUNCTIONAL STATUS - GENERAL
MOBILITY SCORE: 19
DAILY ACTIVITIY SCORE: 19
HELP NEEDED FOR BATHING: A LITTLE
MOVING TO AND FROM BED TO CHAIR: A LITTLE
STANDING UP FROM CHAIR USING ARMS: A LITTLE
PATIENT BASELINE BEDBOUND: NO
WALKING IN HOSPITAL ROOM: A LITTLE
DRESSING REGULAR LOWER BODY CLOTHING: A LITTLE
DRESSING REGULAR UPPER BODY CLOTHING: A LITTLE
PERSONAL GROOMING: A LITTLE
CLIMB 3 TO 5 STEPS WITH RAILING: A LOT
TOILETING: A LITTLE

## 2024-11-05 ASSESSMENT — ACTIVITIES OF DAILY LIVING (ADL)
LACK_OF_TRANSPORTATION: NO
JUDGMENT_ADEQUATE_SAFELY_COMPLETE_DAILY_ACTIVITIES: YES
HEARING - LEFT EAR: FUNCTIONAL
PATIENT'S MEMORY ADEQUATE TO SAFELY COMPLETE DAILY ACTIVITIES?: YES
GROOMING: NEEDS ASSISTANCE
HEARING - RIGHT EAR: FUNCTIONAL
ADEQUATE_TO_COMPLETE_ADL: YES
DRESSING YOURSELF: NEEDS ASSISTANCE
WALKS IN HOME: NEEDS ASSISTANCE
TOILETING: NEEDS ASSISTANCE
LACK_OF_TRANSPORTATION: NO
BATHING: NEEDS ASSISTANCE
FEEDING YOURSELF: INDEPENDENT

## 2024-11-05 ASSESSMENT — COLUMBIA-SUICIDE SEVERITY RATING SCALE - C-SSRS
2. HAVE YOU ACTUALLY HAD ANY THOUGHTS OF KILLING YOURSELF?: NO
6. HAVE YOU EVER DONE ANYTHING, STARTED TO DO ANYTHING, OR PREPARED TO DO ANYTHING TO END YOUR LIFE?: NO
1. IN THE PAST MONTH, HAVE YOU WISHED YOU WERE DEAD OR WISHED YOU COULD GO TO SLEEP AND NOT WAKE UP?: NO

## 2024-11-05 ASSESSMENT — PATIENT HEALTH QUESTIONNAIRE - PHQ9
SUM OF ALL RESPONSES TO PHQ9 QUESTIONS 1 & 2: 0
1. LITTLE INTEREST OR PLEASURE IN DOING THINGS: NOT AT ALL
2. FEELING DOWN, DEPRESSED OR HOPELESS: NOT AT ALL

## 2024-11-05 ASSESSMENT — LIFESTYLE VARIABLES
AUDIT-C TOTAL SCORE: 1
SKIP TO QUESTIONS 9-10: 1
AUDIT-C TOTAL SCORE: 1
HOW OFTEN DO YOU HAVE 6 OR MORE DRINKS ON ONE OCCASION: NEVER
HOW MANY STANDARD DRINKS CONTAINING ALCOHOL DO YOU HAVE ON A TYPICAL DAY: 1 OR 2
HOW OFTEN DO YOU HAVE A DRINK CONTAINING ALCOHOL: MONTHLY OR LESS

## 2024-11-05 ASSESSMENT — PAIN SCALES - GENERAL: PAINLEVEL_OUTOF10: 0 - NO PAIN

## 2024-11-05 ASSESSMENT — PAIN - FUNCTIONAL ASSESSMENT: PAIN_FUNCTIONAL_ASSESSMENT: 0-10

## 2024-11-05 NOTE — ED TRIAGE NOTES
TRIAGE NOTE   I saw the patient as the Clinician in Triage and performed a brief history and physical exam, established acuity, and ordered appropriate tests to develop basic plan of care. Patient will be seen by an [facility], resident and/or physician who will independently evaluate the patient. Please see subsequent provider notes for further details and disposition.     Brief HPI: In brief, Antonio Willis is a 79 y.o. male that presents for cough shortness of breath hypoxia on O2.  History of O2 dependent COPD.  No chest pain.  No documented fever.  Does have dyspnea on exertion.    Focused Physical exam:   Vital signs stable.  Pulse ox 93% on O2.  No respiratory difficulty.  No increased work of breathing.  Speaking easily clearly.  No tachycardia or tachypnea.  He is afebrile.  Lungs diminished breath sounds scattered rhonchi.  Alert coherent    Plan/MDM:   Workup initiated.  Stable pending bed availability and further evaluation.  Please see subsequent provider note for further details and disposition   
Pt arrives to triage with c/o low SPO2, increased coughing, and weakness. Pt had covid 2 months ago and since has been on 2L at home. Pt is on 3L in triage and saturating at 90%. Pt's wife states he has been unable to walk today and has been increasingly disoriented.  
Wheelchair

## 2024-11-05 NOTE — TELEPHONE ENCOUNTER
Returned call to pt's wife regarding cough with yellow mucous. She states that his oxygen dropped into the low 70's while on 2L at rest. She increased his oxygen to 3L and he is currently 88-91%. She was instructed to put him on 4L oxygen, but seemed hesitant.  She states that he is only sob when he is moving. He has a motorized wheelchair that he has been using. No wheezing noticed.  Pt's wife is concerned he may have pneumonia. She was able to schedule a visit with PCP on Thursday due to this matter. She also mentioned that he will be at the hospital tomorrow for CT abd/pelvis. Dr. Dumont was notified and would like pt to go to ER for evaluation. Pt's wife was updated on plan and verbalized understanding. She stated that she will take him to [facility] ER. This nurse gave report to Mary ALDRIDGE from [facility].

## 2024-11-06 ENCOUNTER — APPOINTMENT (OUTPATIENT)
Dept: RADIOLOGY | Facility: HOSPITAL | Age: 79
End: 2024-11-06
Payer: MEDICARE

## 2024-11-06 ENCOUNTER — DOCUMENTATION (OUTPATIENT)
Dept: PHYSICAL THERAPY | Facility: CLINIC | Age: 79
End: 2024-11-06

## 2024-11-06 ENCOUNTER — APPOINTMENT (OUTPATIENT)
Dept: PHYSICAL THERAPY | Facility: CLINIC | Age: 79
End: 2024-11-06
Payer: MEDICARE

## 2024-11-06 LAB
ANION GAP SERPL CALC-SCNC: 11 MMOL/L (ref 10–20)
ANION GAP SERPL CALC-SCNC: 14 MMOL/L (ref 10–20)
BNP SERPL-MCNC: 127 PG/ML (ref 0–99)
BUN SERPL-MCNC: 81 MG/DL (ref 6–23)
BUN SERPL-MCNC: 90 MG/DL (ref 6–23)
CALCIUM SERPL-MCNC: 9.2 MG/DL (ref 8.6–10.3)
CALCIUM SERPL-MCNC: 9.3 MG/DL (ref 8.6–10.3)
CHLORIDE SERPL-SCNC: 124 MMOL/L (ref 98–107)
CHLORIDE SERPL-SCNC: 126 MMOL/L (ref 98–107)
CO2 SERPL-SCNC: 16 MMOL/L (ref 21–32)
CO2 SERPL-SCNC: 17 MMOL/L (ref 21–32)
CREAT SERPL-MCNC: 4.7 MG/DL (ref 0.5–1.3)
CREAT SERPL-MCNC: 4.72 MG/DL (ref 0.5–1.3)
EGFRCR SERPLBLD CKD-EPI 2021: 12 ML/MIN/1.73M*2
EGFRCR SERPLBLD CKD-EPI 2021: 12 ML/MIN/1.73M*2
GLUCOSE SERPL-MCNC: 125 MG/DL (ref 74–99)
GLUCOSE SERPL-MCNC: 137 MG/DL (ref 74–99)
HOLD SPECIMEN: NORMAL
POTASSIUM SERPL-SCNC: 4.3 MMOL/L (ref 3.5–5.3)
POTASSIUM SERPL-SCNC: 4.6 MMOL/L (ref 3.5–5.3)
SODIUM SERPL-SCNC: 149 MMOL/L (ref 136–145)
SODIUM SERPL-SCNC: 150 MMOL/L (ref 136–145)

## 2024-11-06 PROCEDURE — 36415 COLL VENOUS BLD VENIPUNCTURE: CPT | Performed by: INTERNAL MEDICINE

## 2024-11-06 PROCEDURE — 9420000001 HC RT PATIENT EDUCATION 5 MIN

## 2024-11-06 PROCEDURE — 80048 BASIC METABOLIC PNL TOTAL CA: CPT | Performed by: INTERNAL MEDICINE

## 2024-11-06 PROCEDURE — 2500000001 HC RX 250 WO HCPCS SELF ADMINISTERED DRUGS (ALT 637 FOR MEDICARE OP): Performed by: PHARMACIST

## 2024-11-06 PROCEDURE — 2500000002 HC RX 250 W HCPCS SELF ADMINISTERED DRUGS (ALT 637 FOR MEDICARE OP, ALT 636 FOR OP/ED): Performed by: INTERNAL MEDICINE

## 2024-11-06 PROCEDURE — 74176 CT ABD & PELVIS W/O CONTRAST: CPT | Performed by: RADIOLOGY

## 2024-11-06 PROCEDURE — 99223 1ST HOSP IP/OBS HIGH 75: CPT | Performed by: INTERNAL MEDICINE

## 2024-11-06 PROCEDURE — 2500000004 HC RX 250 GENERAL PHARMACY W/ HCPCS (ALT 636 FOR OP/ED): Performed by: INTERNAL MEDICINE

## 2024-11-06 PROCEDURE — 2500000004 HC RX 250 GENERAL PHARMACY W/ HCPCS (ALT 636 FOR OP/ED): Performed by: NURSE PRACTITIONER

## 2024-11-06 PROCEDURE — 94667 MNPJ CHEST WALL 1ST: CPT

## 2024-11-06 PROCEDURE — 74176 CT ABD & PELVIS W/O CONTRAST: CPT

## 2024-11-06 PROCEDURE — 1100000001 HC PRIVATE ROOM DAILY

## 2024-11-06 PROCEDURE — 2500000005 HC RX 250 GENERAL PHARMACY W/O HCPCS: Performed by: INTERNAL MEDICINE

## 2024-11-06 PROCEDURE — 2500000001 HC RX 250 WO HCPCS SELF ADMINISTERED DRUGS (ALT 637 FOR MEDICARE OP): Performed by: INTERNAL MEDICINE

## 2024-11-06 PROCEDURE — 94640 AIRWAY INHALATION TREATMENT: CPT

## 2024-11-06 PROCEDURE — 82374 ASSAY BLOOD CARBON DIOXIDE: CPT | Performed by: INTERNAL MEDICINE

## 2024-11-06 RX ORDER — AZITHROMYCIN 500 MG/1
500 TABLET, FILM COATED ORAL EVERY 24 HOURS
Status: DISCONTINUED | OUTPATIENT
Start: 2024-11-06 | End: 2024-11-08

## 2024-11-06 RX ORDER — IPRATROPIUM BROMIDE AND ALBUTEROL SULFATE 2.5; .5 MG/3ML; MG/3ML
3 SOLUTION RESPIRATORY (INHALATION)
Status: DISCONTINUED | OUTPATIENT
Start: 2024-11-06 | End: 2024-11-13 | Stop reason: HOSPADM

## 2024-11-06 RX ADMIN — Medication 1000 UNITS: at 09:31

## 2024-11-06 RX ADMIN — Medication 2 L/MIN: at 19:30

## 2024-11-06 RX ADMIN — IPRATROPIUM BROMIDE AND ALBUTEROL SULFATE 3 ML: 2.5; .5 SOLUTION RESPIRATORY (INHALATION) at 19:30

## 2024-11-06 RX ADMIN — SODIUM BICARBONATE 650 MG: 650 TABLET ORAL at 20:48

## 2024-11-06 RX ADMIN — SODIUM BICARBONATE 650 MG: 650 TABLET ORAL at 09:31

## 2024-11-06 RX ADMIN — Medication 2 L/MIN: at 12:08

## 2024-11-06 RX ADMIN — IPRATROPIUM BROMIDE AND ALBUTEROL SULFATE 3 ML: 2.5; .5 SOLUTION RESPIRATORY (INHALATION) at 07:13

## 2024-11-06 RX ADMIN — IPRATROPIUM BROMIDE AND ALBUTEROL SULFATE 3 ML: 2.5; .5 SOLUTION RESPIRATORY (INHALATION) at 12:08

## 2024-11-06 RX ADMIN — CEFTRIAXONE 2 G: 2 INJECTION, POWDER, FOR SOLUTION INTRAMUSCULAR; INTRAVENOUS at 20:42

## 2024-11-06 RX ADMIN — AZITHROMYCIN DIHYDRATE 500 MG: 500 TABLET ORAL at 20:48

## 2024-11-06 RX ADMIN — HEPARIN SODIUM 5000 UNITS: 5000 INJECTION, SOLUTION INTRAVENOUS; SUBCUTANEOUS at 20:48

## 2024-11-06 RX ADMIN — Medication 2 L/MIN: at 07:13

## 2024-11-06 RX ADMIN — ROSUVASTATIN CALCIUM 10 MG: 10 TABLET, FILM COATED ORAL at 20:48

## 2024-11-06 RX ADMIN — METHYLPREDNISOLONE SODIUM SUCCINATE 40 MG: 40 INJECTION, POWDER, FOR SOLUTION INTRAMUSCULAR; INTRAVENOUS at 15:35

## 2024-11-06 RX ADMIN — HEPARIN SODIUM 5000 UNITS: 5000 INJECTION, SOLUTION INTRAVENOUS; SUBCUTANEOUS at 15:35

## 2024-11-06 RX ADMIN — HEPARIN SODIUM 5000 UNITS: 5000 INJECTION, SOLUTION INTRAVENOUS; SUBCUTANEOUS at 06:22

## 2024-11-06 RX ADMIN — SODIUM CHLORIDE 500 ML: 9 INJECTION, SOLUTION INTRAVENOUS at 17:48

## 2024-11-06 RX ADMIN — TAMSULOSIN HYDROCHLORIDE 0.4 MG: 0.4 CAPSULE ORAL at 20:48

## 2024-11-06 ASSESSMENT — COGNITIVE AND FUNCTIONAL STATUS - GENERAL
DRESSING REGULAR LOWER BODY CLOTHING: A LITTLE
TOILETING: A LOT
MOVING TO AND FROM BED TO CHAIR: A LITTLE
STANDING UP FROM CHAIR USING ARMS: A LITTLE
HELP NEEDED FOR BATHING: A LITTLE
MOBILITY SCORE: 16
HELP NEEDED FOR BATHING: A LITTLE
DRESSING REGULAR UPPER BODY CLOTHING: A LITTLE
PERSONAL GROOMING: A LITTLE
MOBILITY SCORE: 19
TURNING FROM BACK TO SIDE WHILE IN FLAT BAD: A LITTLE
DAILY ACTIVITIY SCORE: 19
WALKING IN HOSPITAL ROOM: A LITTLE
TOILETING: A LITTLE
STANDING UP FROM CHAIR USING ARMS: A LITTLE
DRESSING REGULAR UPPER BODY CLOTHING: A LITTLE
WALKING IN HOSPITAL ROOM: A LOT
PERSONAL GROOMING: A LITTLE
DAILY ACTIVITIY SCORE: 18
MOVING TO AND FROM BED TO CHAIR: A LITTLE
DRESSING REGULAR LOWER BODY CLOTHING: A LITTLE
CLIMB 3 TO 5 STEPS WITH RAILING: TOTAL
CLIMB 3 TO 5 STEPS WITH RAILING: A LOT

## 2024-11-06 ASSESSMENT — ENCOUNTER SYMPTOMS
FEVER: 0
SHORTNESS OF BREATH: 1
COUGH: 1
FATIGUE: 1

## 2024-11-06 ASSESSMENT — PAIN SCALES - WONG BAKER
WONGBAKER_NUMERICALRESPONSE: NO HURT
WONGBAKER_NUMERICALRESPONSE: NO HURT

## 2024-11-06 ASSESSMENT — ACTIVITIES OF DAILY LIVING (ADL): LACK_OF_TRANSPORTATION: NO

## 2024-11-06 ASSESSMENT — PAIN SCALES - GENERAL
PAINLEVEL_OUTOF10: 0 - NO PAIN
PAINLEVEL_OUTOF10: 0 - NO PAIN

## 2024-11-06 NOTE — CONSULTS
"    Department of Medicine  Division of Pulmonary, Critical Care, and Sleep Medicine  Location  [facility]    Reason for consult: \"ILD,admitted with dyspnea\"  Requesting physician: Rome Kerns MD    Physician HPI (11/6/2024):  79 y.o. male admitted on 11/5/2024  8:13 PM for shortness of breath. For the past 3 days he has developed a productive cough of clear/yellow phlegm, and has become more dyspneic and weak. He was unable to tolerate PT due to worsening hypoxia.     He saw Dr. Dumont in September for initial evaluation for ILD.  ILD was first noted on CT abdomen from 2020. He is a former 70-pack-year cigarette smoker (started at the age of 10) and former cigar smoker.  He has a past history of CKD stage V, chronic hydronephrosis, multiple sclerosis.  He also had COVID-19 in August 2024 and since then has required supplemental O2.    This admission he has tested negative for SARS-CoV-2 and influenza.  WBC count 8.8, , troponin 17, BUN/creatinine 87/4.98, serum bicarb 19. CXR notable for increased interstitial markings.    Today, he states he feels better, however his most recent BP was 86/48. He is asking about going home today. They      PMH:  Past Medical History:   Diagnosis Date    Cardiac pacemaker     Implanted 10/17/2011 due to SSS    Chronic anemia     CKD (chronic kidney disease), stage IV (Multi)     Granuloma annulare     Hemiplegia, unspecified affecting right dominant side (Multi)     Right Hemiparesis from MS    History of GI bleed     UGI due to gastric and duodenal ulcer    History of prostate cancer     dx 02/13/2008; treated with hormone tx and external beam radiation    Hydronephrosis concurrent with and due to calculi of kidney and ureter     Recurrent episodes on the LEFT    Hyperkalemia     Due to CKD; followed by Dr [place] Higgins    Hyperlipidemia, unspecified     Multiple sclerosis (Multi)     Dx 2011    Pyuria     Chronic    Recurrent kidney stones     Left side    " Tremor, unspecified        PSH:  Past Surgical History:   Procedure Laterality Date    BLADDER STONE REMOVAL  2007    Cystolithalopaxy    CYSTOSCOPY W/ LASER LITHOTRIPSY Left     12/3/20, 21, 21, 21, 23    EXTRACORPOREAL SHOCK WAVE LITHOTRIPSY Left     10/01/20, 10/29/20    HERNIA REPAIR Right     Right inguinal hernia repair    IR  NEPHROSTOMY PLACEMENT  2021    IR  NEPHROSTOMY PLACEMENT 2021 [facility]    ORIF ANKLE FRACTURE Right     PACEMAKER PLACEMENT  10/17/2011    PROSTATE BIOPSY  2008    + cancer    US GUIDED ABSCESS DRAIN  2021    US GUIDED ABSCESS DRAIN 2021 [facility]       FHx:  Family History   Problem Relation Name Age of Onset    Leukemia Mother      Heart attack Father      Diabetes Father      Diabetes Sister      Tremor Sister      Leukemia Brother      Parkinsonism Father's Sister         Social Hx:  Social History     Socioeconomic History    Marital status:    Tobacco Use    Smoking status: Former     Current packs/day: 0.00     Average packs/day: 1 pack/day for 67.0 years (67.0 ttl pk-yrs)     Types: Cigars, Cigarettes     Start date:      Quit date:      Years since quittin.8     Passive exposure: Current    Tobacco comments:     Has not smoked in the last 3-4 weeks per patient wife   Vaping Use    Vaping status: Never Used   Substance and Sexual Activity    Alcohol use: Yes     Alcohol/week: 1.0 standard drink of alcohol     Types: 1 Glasses of wine per week     Comment: 2 month    Drug use: Never     Social Drivers of Health     Financial Resource Strain: Low Risk  (2024)    Overall Financial Resource Strain (CARDIA)     Difficulty of Paying Living Expenses: Not hard at all   Food Insecurity: No Food Insecurity (2024)    Hunger Vital Sign     Worried About Running Out of Food in the Last Year: Never true     Ran Out of Food in the Last Year: Never true   Transportation Needs: No  Transportation Needs (11/6/2024)    PRAPARE - Transportation     Lack of Transportation (Medical): No     Lack of Transportation (Non-Medical): No   Intimate Partner Violence: Not At Risk (11/5/2024)    Humiliation, Afraid, Rape, and Kick questionnaire     Fear of Current or Ex-Partner: No     Emotionally Abused: No     Physically Abused: No     Sexually Abused: No   Housing Stability: Low Risk  (11/6/2024)    Housing Stability Vital Sign     Unable to Pay for Housing in the Last Year: No     Number of Times Moved in the Last Year: 0     Homeless in the Last Year: No       Immunization History:  Immunization History   Administered Date(s) Administered    Flu vaccine (IIV4), preservative free *Check age/dose* 09/14/2020    Flu vaccine, quadrivalent, high-dose, preservative free, age 65y+ (FLUZONE) 10/04/2022    Flu vaccine, trivalent, preservative free, age 6 months and greater (Fluarix/Fluzone/Flulaval) 10/04/2014, 08/16/2016, 09/17/2020    Influenza, Seasonal, Quadrivalent, Adjuvanted 10/23/2021    Influenza, injectable, quadrivalent 09/23/2019    Influenza, seasonal, injectable 09/21/2013, 08/30/2014, 09/29/2015, 09/13/2017    Influenza, trivalent, adjuvanted 09/06/2018    Pfizer COVID-19 vaccine, bivalent, age 12 years and older (30 mcg/0.3 mL) 11/10/2022    Pfizer Purple Cap SARS-CoV-2 09/24/2021    Td vaccine, age 7 years and older (TDVAX) 07/14/2009, 05/15/2012       Current Medications:  Scheduled medications  azithromycin, 500 mg, oral, q24h  cefTRIAXone, 2 g, intravenous, q24h  cholecalciferol, 1,000 Units, oral, Daily  famotidine, 10 mg, oral, q48h  heparin (porcine), 5,000 Units, subcutaneous, q8h PAULETTE  ipratropium-albuteroL, 3 mL, nebulization, TID  methylPREDNISolone sodium succinate (PF), 40 mg, intravenous, q8h  polyethylene glycol, 17 g, oral, Daily  rosuvastatin, 10 mg, oral, Nightly  sodium bicarbonate, 650 mg, oral, BID  tamsulosin, 0.4 mg, oral, Nightly      Continuous medications  oxygen, 2  "L/min      PRN medications  PRN medications: acetaminophen, ipratropium-albuteroL, ondansetron **OR** ondansetron     Drug Allergies/Intolerances:  No Known Allergies     Review of Systems:  Review of Systems   Constitutional:  Positive for fatigue. Negative for fever.   Respiratory:  Positive for cough and shortness of breath.         All other review of systems are negative and/or non-contributory.    Physical Examination:      10/10/2024     9:58 AM 2024     5:39 PM 2024     1:00 AM 2024     6:40 AM 2024     7:13 AM 2024    11:00 AM 2024    12:05 PM   Vitals   Systolic  107 105 108 104 88 86   Diastolic  62 56 57 59 47 48   Heart Rate  63 62 61  70    Temp  36.1 °C (97 °F) 36.2 °C (97.2 °F) 36.2 °C (97.2 °F) 36.3 °C (97.4 °F) 36.3 °C (97.3 °F)    Resp  20 20 20      Height (in) 1.651 m (5' 5\") 1.651 m (5' 5\")        Weight (lb) 169 169        BMI 28.12 kg/m2 28.12 kg/m2        BSA (m2) 1.88 m2 1.88 m2              GEN: non-toxic appearing. Mildly short of breath  EYES: wears corrective lenses  ENT: wearing O2 cannula at 3L/min  CV: RRR, no m/g/r  LUNGS: moderate effort, bilateral crackles  EXT: no edema, cyanosis, clubbing      Pulmonary Function Test Results     2024:  FVC: 2.27 L (68%)  FEV1: 1.71 L (73%)  FEV1/FVC: 75  T.39 L (86%)  DLCO: 48%  Significant bronchodilator response in FVC    6MWT: SPO2 room air 85%. SpO2 on 2L/min 92%. Only walked 10 meters    Exacerbation History     N/A    Imaging     HRCT 2024:  1. Basal predominant subpleural reticulations, traction  bronchiectasis/bronchiolectasis and no convincing evidence of  honeycombing. Findings are in keeping with interstitial lung disease  and might be sequela of chronic smoking (RB ILD). Given the presence  of component of ground-glass opacity, fibrotic NSIP cannot be  excluded.  2. Areas of ground-glass and patchy consolidative opacity in lung  bases which might be due to component of " active  inflammatory/infectious process. Consider follow-up chest CT after  resolution of these potentially reversible components to better  assess the underlying lung disease  3. The above-mentioned findings are superimposed on apically  predominant moderate-to-severe centrilobular and paraseptal emphysema.  4. Mildly dilated main pulmonary artery measuring 3.2 cm, correlate  with concern for pulmonary hypertension.  5. Small pulmonary nodule in the left lung apex measures 0.4 cm. No  follow-up imaging is required, however repeat CT chest can be  considered in 12 months.      *HRCT pattern of pulmonary fibrosis: 'Indeterminate for UIP'    Bronchoscopy     None    Labs     Results for orders placed or performed during the hospital encounter of 11/05/24 (from the past 24 hours)   Sars-CoV-2 PCR   Result Value Ref Range    Coronavirus 2019, PCR Not Detected Not Detected   Influenza A, and B PCR   Result Value Ref Range    Flu A Result Not Detected Not Detected    Flu B Result Not Detected Not Detected   CBC and Auto Differential   Result Value Ref Range    WBC 8.8 4.4 - 11.3 x10*3/uL    nRBC 0.0 0.0 - 0.0 /100 WBCs    RBC 3.62 (L) 4.50 - 5.90 x10*6/uL    Hemoglobin 11.8 (L) 13.5 - 17.5 g/dL    Hematocrit 37.8 (L) 41.0 - 52.0 %     (H) 80 - 100 fL    MCH 32.6 26.0 - 34.0 pg    MCHC 31.2 (L) 32.0 - 36.0 g/dL    RDW 18.9 (H) 11.5 - 14.5 %    Platelets 117 (L) 150 - 450 x10*3/uL    Neutrophils % 77.4 40.0 - 80.0 %    Immature Granulocytes %, Automated 0.7 0.0 - 0.9 %    Lymphocytes % 11.7 13.0 - 44.0 %    Monocytes % 6.2 2.0 - 10.0 %    Eosinophils % 3.7 0.0 - 6.0 %    Basophils % 0.3 0.0 - 2.0 %    Neutrophils Absolute 6.78 (H) 1.60 - 5.50 x10*3/uL    Immature Granulocytes Absolute, Automated 0.06 0.00 - 0.50 x10*3/uL    Lymphocytes Absolute 1.02 0.80 - 3.00 x10*3/uL    Monocytes Absolute 0.54 0.05 - 0.80 x10*3/uL    Eosinophils Absolute 0.32 0.00 - 0.40 x10*3/uL    Basophils Absolute 0.03 0.00 - 0.10 x10*3/uL    Comprehensive metabolic panel   Result Value Ref Range    Glucose 56 (L) 74 - 99 mg/dL    Sodium 148 (H) 136 - 145 mmol/L    Potassium 5.6 (H) 3.5 - 5.3 mmol/L    Chloride 122 (H) 98 - 107 mmol/L    Bicarbonate 19 (L) 21 - 32 mmol/L    Anion Gap 13 10 - 20 mmol/L    Urea Nitrogen 87 (H) 6 - 23 mg/dL    Creatinine 4.98 (H) 0.50 - 1.30 mg/dL    eGFR 11 (L) >60 mL/min/1.73m*2    Calcium 9.9 8.6 - 10.3 mg/dL    Albumin 3.7 3.4 - 5.0 g/dL    Alkaline Phosphatase 293 (H) 33 - 136 U/L    Total Protein 7.6 6.4 - 8.2 g/dL     (H) 9 - 39 U/L    Bilirubin, Total 0.6 0.0 - 1.2 mg/dL     (H) 10 - 52 U/L   Troponin I, High Sensitivity   Result Value Ref Range    Troponin I, High Sensitivity 17 0 - 20 ng/L   B-Type Natriuretic Peptide   Result Value Ref Range     (H) 0 - 99 pg/mL   ECG 12 lead   Result Value Ref Range    Ventricular Rate 64 BPM    Atrial Rate 64 BPM    GA Interval 212 ms    QRS Duration 160 ms    QT Interval 442 ms    QTC Calculation(Bazett) 455 ms    P Axis 48 degrees    R Axis 253 degrees    T Axis 46 degrees    QRS Count 11 beats    Q Onset 184 ms    P Onset 86 ms    P Offset 135 ms    T Offset 405 ms    QTC Fredericia 451 ms   Basic metabolic panel   Result Value Ref Range    Glucose      Sodium 150 (H) 136 - 145 mmol/L    Potassium 4.3 3.5 - 5.3 mmol/L    Chloride 126 (H) 98 - 107 mmol/L    Bicarbonate 17 (L) 21 - 32 mmol/L    Anion Gap 11 10 - 20 mmol/L    Urea Nitrogen 90 (H) 6 - 23 mg/dL    Creatinine 4.72 (H) 0.50 - 1.30 mg/dL    eGFR 12 (L) >60 mL/min/1.73m*2    Calcium 9.3 8.6 - 10.3 mg/dL   Lavender Top   Result Value Ref Range    Extra Tube Hold for add-ons.          Echocardiogram     10/10/2024:  1. Left ventricular ejection fraction is normal, by visual estimate at 60-65%.   2. Spectral Doppler shows an impaired relaxation pattern of left ventricular diastolic filling.   3. Left ventricular cavity size is decreased.   4. There is normal right ventricular global systolic  function.   5. Mild to moderate tricuspid regurgitation visualized.   6. Moderately elevated right ventricular systolic pressure 58.6mmHg.    ASSESSMENT & PLAN     Summary:  79 y.o. male admitted on 11/5/2024  8:13 PM for worsening dyspnea, weakness and sputum production. Lab work is negative for infectious process, however, he does have some increased opacification on his chest x-ray which may be suggestive for community-acquired pneumonia.  He is currently requiring 3 L/min O2 supplementation.  He also states that he feels more weak which could be secondary to an MS flareup.  This could be a combination of CAP vs ILD flare.    Problem list:  Patient Active Problem List   Diagnosis    Abnormal renal function    Adenocarcinoma of prostate (Multi)    Anemia due to blood loss    Benign prostatic hyperplasia    Calculus in urethra    Dyslipidemia    Gastroesophageal reflux disease    Hematuria    Hyperlipidemia    Melena    Multiple sclerosis (Multi)    Presence of cardiac pacemaker    Sinus node dysfunction (Multi)    Duodenal ulcer    Peptic ulcer disease    Stomach ulcer    Ureteric stone    Tobacco use    History of hemiparesis    Urinary tract infection in elderly patient    MS (multiple sclerosis) (Multi)    Upper extremity weakness    Pneumonia of right upper lobe due to infectious organism        Recommendations:  -Agree with current antibiotic regimen  -Agree with IV steroids  -O2 supplementation to keep SpO2 90-92%  -Advised to find a pulmonologist in [place] as they will be living there for the next 5 months  -Incentive spirometry  -Bronchopulmonary hygiene  -Bronchodilators    Pallavi Mcnally DO  Staff Physician - Pulmonary & Critical Care  11/06/24 1:37 PM

## 2024-11-06 NOTE — ASSESSMENT & PLAN NOTE
79-year-old gentleman with history of multiple sclerosis, without significant neurological deficit, history of previous permanent pacemaker placement, hypertension, recent COVID infection about 2 months ago, interstitial lung disease, chronic smoking which he quit about a month ago, history of prostate carcinoma in the past s/p radiation treatment and history of BPH and nephrolithiasis, patient is on home oxygen for interstitial lung disease, presents with increasing shortness of breath and cough.  He normally uses 2 to 3 L of nasal cannula oxygen at home.  His chest x-ray is consistent with interstitial lung disease, although he is admitted with a diagnosis of pneumonia but chest x-ray revealed no evidence of consolidation.  He has had a high-resolution CT in September 2024 which is consistent with interstitial lung disease and significant emphysema.    1.  Acute exacerbation of chronic lung disease with wheezing and shortness of breath and questionable pneumonic infiltrate and history of interstitial lung disease.  White cell count is normal, chest x-ray revealed interstitial lung disease without definite consolidation.  Patient started on IV ceftriaxone and azithromycin  He does have a history of smoking and there is some element of COPD exacerbation therefore I have added Solu-Medrol 40 mg every 8 hours and I have also requested a pulmonary consultation who have seen the patient and they concur with the treatment.  To continue with oxygen to maintain his saturation above 92%.    2.  Low normal blood pressures, asymptomatic, serum troponin is negative and EKG reveals no acute ischemic changes, will monitor closely.  No evidence of overt heart failure.  Most recent echocardiogram was in October 2024 which revealed normal left ventricular systolic function elevated right ventricular systolic pressure and diastolic dysfunction.  Will monitor blood pressure closely    3.  History of COVID infection few months ago  currently has no symptoms related to COVID.    4.  Chronic kidney disease stage IV with a serum potassium of 5.6 and serum creatinine of 4.6 on admission.  Hyperkalemia has been treated and repeat potassium is 4.6 today.  Patient follows with Dr. Higgins as an outpatient.    5.  Previous history of prostate carcinoma s/p radiation and history of BPH and nephrolithiasis, patient had taken a second urological opinion at [place] and then he came back to  and has seen Dr. Buckner who had advised him to get CT of the abdomen and pelvis which I had ordered this afternoon.  Incidentally he was supposed to get CT of abdomen pelvis this afternoon as an outpatient.    6.  Hyperlipidemia continue with current medications    Reviewed all labs and imaging studies and discussed the plan of treatment with patient in detail.

## 2024-11-06 NOTE — CONSULTS
Inpatient consult to Cardiology  Consult performed by: МАРИНА Brice-CNP  Consult ordered by: Rome Kerns MD  Reason for consult: Patient with low BP, blood pressure ranging 86-104mmHG, not an antihypertensive medication and troponin negative and EKG unremarkable.      History Of Present Illness:    Antonio Willis is a 79 y.o. male with past medical history significant for hyperlipidemia with statin intolerance, sinus node dysfunction status post dual-chamber pacemaker placement 10/17/2011, bifascicular block, upper GI bleed in the setting of gastric and duodenal ulcers, interstitial lung disease, COPD, chronic hypoxic respiratory failure, chronic tobacco use,  chronic kidney disease, multiple sclerosis. Presented with shortness of breath. Cardiology is consulted for Patient with low BP, blood pressure ranging 86-104mmHG, not an antihypertensive medication and troponin negative and EKG unremarkable.     Patient reports he is been struggling with increased shortness of breath and weakness over the last couple of days.  Feels like symptoms may be secondary to his multiple sclerosis.  Denies any recent fever.  States he has been having a productive cough which is new for him.  Sputum has been clear to yellowish in color.  Denies having any chest discomfort.  Denies any dizziness, lightheadedness, lower extremity edema, syncope or near syncope.  Given ongoing symptoms he opted sent to emergency room for further evaluation.    At [facility], EKG showed paced rhythm.   Chest x-ray showed increased interstitial markings in the right upper lung compared to prior exam on 08/25/2024, possibly representing developing airspace infiltrate  CT abdomen and pelvis showed persistent severe left sided hydronephrosis and hydroureter with cortical thinning of the left kidney and previously noted larger  distal left ureteral stone has resolved with residual much smaller 4 mm stone in the distal left ureter.  "Also with worsening bibasilar bronchiectasis and interstitial thickening and  ground-glass infiltrates. Labs  K 5.6 BUN 87 creatinine 4.98   WBC 8.8 hemoglobin 11.8 hematocrit 37.8  platelets 117 high sensitivity troponin  17  Initial vital signs temp 36.1 HR 63 RR 20 107/62 pulse ox 90% on room air. He was treated with IV Rocephin,  Lokelma 10 g, Duonebs nebulizer, IV Solumedrol 40 mg IVP, Sodium bicarb 650 mg oral.  He was noted to be hypotensive with systolic blood pressures ranging from 80s to 100s.  Cardiology was asked to evaluate.      Follows with cardiologist Dr. Miller. Last seen 10/2/2024. At that time patient was experiencing shortness of breath. Echocardiogram was ordered which showed normal left ventricular function no significant valve disease moderately elevated pulmonary pressure in setting of significant pulmonary disease.      Home CV meds  Crestor 10 mg daily     Last Recorded Vitals:  Vitals:    11/06/24 1100 11/06/24 1205 11/06/24 1208 11/06/24 1531   BP: (!) 88/47 (!) 86/48     BP Location: Right arm Left arm     Patient Position: Lying Lying     Pulse: 70      Resp:       Temp: 36.3 °C (97.3 °F)      TempSrc:       SpO2: 93%  93% 93%   Weight:       Height:           Last Labs:  LABS:  CMP:  Results from last 7 days   Lab Units 11/06/24  1124 11/05/24  1828 10/31/24  1353   SODIUM mmol/L 150* 148* 147*   POTASSIUM mmol/L 4.3 5.6* 4.8   CHLORIDE mmol/L 126* 122* 121*   CO2 mmol/L 17* 19* 20*   ANION GAP mmol/L 11 13 11   BUN mg/dL 90* 87* 82*   CREATININE mg/dL 4.72* 4.98* 4.18*   EGFR mL/min/1.73m*2 12* 11* 14*   ALBUMIN g/dL  --  3.7 3.4   ALT U/L  --  188*  --    AST U/L  --  130*  --    BILIRUBIN TOTAL mg/dL  --  0.6  --      CBC:  Results from last 7 days   Lab Units 11/05/24  1828   WBC AUTO x10*3/uL 8.8   HEMOGLOBIN g/dL 11.8*   HEMATOCRIT % 37.8*   PLATELETS AUTO x10*3/uL 117*   MCV fL 104*     COAG:     ABO: No results found for: \"ABO\"  HEME/ENDO:     CARDIAC: "   Results from last 7 days   Lab Units 11/05/24  1828   TROPHS ng/L 17   BNP pg/mL 127*     Recent Labs     10/31/24  1353 08/29/23  0825 07/30/22  1002 07/26/21  0902   CHOL 105 114 112 118   LDLF  --  55 54 52   LDLCALC 40  --   --   --    HDL 46.8 38.4* 44.8 46.5   TRIG 89 104 67 100      [facility] Results  CT abdomen pelvis wo IV contrast   Final Result   1.  Persistent severe left-sided hydronephrosis and hydroureter with   cortical thinning of the left kidney. However previously noted larger   distal left ureteral stone has resolved with residual much smaller 4   mm stone in the distal left ureter as described.   2. Worsening bibasilar bronchiectasis and interstitial thickening and   ground-glass infiltrates.             MACRO:   None        Signed by: Chase Rivera 11/6/2024 4:17 PM   Dictation workstation:   [id number]      XR chest 2 views   Final Result   1.  Somewhat increased interstitial markings in the right upper lung   compared to prior exam on 08/25/2024, possibly representing   developing airspace infiltrate.   2. Prominence of the interstitial markings in the lungs bilaterally,   likely representing component of chronic interstitial lung disease.        MACRO:   None        Signed by: Pepe Scruggs 11/5/2024 7:12 PM   Dictation workstation:   [id number]         Last I/O:  I/O last 3 completed shifts:  In: - (0 mL/kg)   Out: 250 (3.3 mL/kg) [Urine:250 (0.1 mL/kg/hr)]  Weight: 76.7 kg     Past Cardiology Tests (Last 3 Years):  EKG:  ECG 12 lead 11/05/2024 (Preliminary)      Echo:  Transthoracic echo (TTE) complete 10/10/2024   1. Left ventricular ejection fraction is normal, by visual estimate at 60-65%.   2. Spectral Doppler shows an impaired relaxation pattern of left ventricular diastolic filling.   3. Left ventricular cavity size is decreased.   4. There is normal right ventricular global systolic function.   5. Mild to moderate tricuspid regurgitation visualized.   6. Moderately  elevated right ventricular systolic pressure.      Ejection Fractions:  EF   Date/Time Value Ref Range Status   10/10/2024 10:46 AM 63 %      Cath:  No results found for this or any previous visit from the past 1095 days.    Stress Test:  No results found for this or any previous visit from the past 1095 days.    Cardiac Imaging:  No results found for this or any previous visit from the past 1095 days.      Past Medical History:  He has a past medical history of Cardiac pacemaker, Chronic anemia, CKD (chronic kidney disease), stage IV (Multi), Granuloma annulare, Hemiplegia, unspecified affecting right dominant side (Multi), History of GI bleed, History of prostate cancer, Hydronephrosis concurrent with and due to calculi of kidney and ureter, Hyperkalemia, Hyperlipidemia, unspecified, Multiple sclerosis (Multi), Pyuria, Recurrent kidney stones, and Tremor, unspecified.    Past Surgical History:  He has a past surgical history that includes Hernia repair (Right, 1993); US guided abscess drain (11/24/2021); IR placement of nephrostomy catheter (11/24/2021); Prostate biopsy (02/13/2008); ORIF ankle fracture (Right, 2003); Cystoscopy w/ laser lithotripsy (Left); Extracorporeal shock wave lithotripsy (Left); Bladder stone removal (11/09/2007); and pacemaker placement (10/17/2011).      Social History:  He reports that he quit smoking about 22 months ago. His smoking use included cigars and cigarettes. He started smoking about 68 years ago. He has a 67 pack-year smoking history. He has been exposed to tobacco smoke. He does not have any smokeless tobacco history on file. He reports current alcohol use of about 1.0 standard drink of alcohol per week. He reports that he does not use drugs.    Family History:  Family History   Problem Relation Name Age of Onset    Leukemia Mother      Heart attack Father      Diabetes Father      Diabetes Sister      Tremor Sister      Leukemia Brother      Parkinsonism Father's Sister           Allergies:  Patient has no known allergies.    Inpatient Medications:  Scheduled medications   Medication Dose Route Frequency    azithromycin  500 mg oral q24h    cefTRIAXone  2 g intravenous q24h    cholecalciferol  1,000 Units oral Daily    famotidine  10 mg oral q48h    heparin (porcine)  5,000 Units subcutaneous q8h PAULETTE    ipratropium-albuteroL  3 mL nebulization TID    methylPREDNISolone sodium succinate (PF)  40 mg intravenous q8h    polyethylene glycol  17 g oral Daily    rosuvastatin  10 mg oral Nightly    sodium bicarbonate  650 mg oral BID    tamsulosin  0.4 mg oral Nightly     PRN medications   Medication    acetaminophen    ipratropium-albuteroL    ondansetron    Or    ondansetron     Continuous Medications   Medication Dose Last Rate    oxygen  2 L/min       Outpatient Medications:  Current Outpatient Medications   Medication Instructions    acetaminophen (TYLENOL) 650 mg, oral, Every 6 hours PRN    albuterol 90 mcg/actuation inhaler 2 puffs, inhalation, Every 4 hours PRN    albuterol 2.5 mg, nebulization, 2 times daily    cholecalciferol (VITAMIN D-3) 25 mcg, Daily    famotidine (PEPCID) 20 mg, Nightly    ferrous sulfate 325 (65 Fe) MG tablet Iron 325 (65 Fe) MG Oral Tablet   Refills: 0        Start : 20-Jul-2022   Active    multivit-iron-minerals-folic acid (Centrum Silver) 0.4 mg-300 mcg- 250 mcg tab 1 tablet, Daily    oxygen (O2) 2 L/min, inhalation, Continuous    rosuvastatin (CRESTOR) 10 mg, oral, Nightly    tamsulosin (Flomax) 0.4 mg 24 hr capsule 1 capsule, Nightly       Physical Exam:  GENERAL: alert, cooperative, pleasant, in no acute distress  SKIN: warm, dry  NECK: no JVD  CARDIAC: Regular rate and rhythm no murmurs  PULMONARY: SOB + cough and expiratory wheezes.  On supplemental oxygen via 2L NC   ABDOMEN: soft, nondistended  EXTREMITIES: no lower extremity edema  NEURO: Alert and oriented x 3.  Grossly normal.  Moves all 4 extremities.    Not currently on telemetry      Assessment/Plan   Antonio Willis is a 79 y.o. male with past medical history significant for hyperlipidemia with statin intolerance, sinus node dysfunction status post dual-chamber pacemaker placement 10/17/2011, bifascicular block, upper GI bleed in the setting of gastric and duodenal ulcers, interstitial lung disease, COPD, chronic hypoxic respiratory failure, chronic tobacco use,  chronic kidney disease, multiple sclerosis. Presented with shortness of breath. Cardiology is consulted for Patient with low BP, blood pressure ranging 86-104mmHG, not an antihypertensive medication and troponin negative and EKG unremarkable.     Home CV meds: Crestor 10 mg daily  -Follows with cardiology Dr. Miller    #Dyspnea  -Secondary to his underlying interstitial lung disease/COPD.  Chest x-ray also with concerns for possible pneumonia.  CT showed lower chest with bibasilar bronchiectasis and bibasilar infiltrates and interstitial thickening.  Currently getting treated for community-acquired pneumonia.  He is euvolemic by clinical exam.  Recent echo with normal LV systolic function.    # Hypotension  Asymptomatic.   Also with hypernatremia.  Will trial IV volume repletion with 500 mL normal saline bolus    #Hyperlipidemia  Statin intolerance-currently only on low-dose Crestor  Last LDL 40    #History of sinus node dysfunction status post PPM  Follows with device clinic    #Chronic kidney disease   Hyperkalemic on presentation treated with Lokelma.    Recommendations  500 mL normal saline bolus  Encourage oral intake    Will discuss with Dr. Miller      Code Status:  Full Code          Tobi Merchant, APRN-CNP    This is a shared visit. I have reviewed the Advanced Practice Provider's encounter note, approve the Advanced Practice Provider's documentation, and provide the following additional information from my personal encounter.     Patient presents with 2 to 3-day history of increasing shortness of breath, generalized  malaise and fatigue, and low oxygen saturation at home.  No chest discomfort.    VS noted  In NAD  JVP is v10 flattens with inspiration neg HJR  Lung: rhonchi  B  Cor: nl s1 s2 no mrg  Abd: s/nt/nd  Ext warm no edema    I reviewed his electrocardiogram that showed atrial pacing with right bundle branch block and left anterior fascicular block.    Acute on chronic hypoxic respiratory failure due to interstitial lung disease, COPD exacerbation, possible pneumonia  Asymptomatic hypotension due to volume depletion - resolved  CKD 4  Hyperlipidemia  History of sinus node dysfunction status post pacemaker placement  Bifascicular block    Recommendation:  Monitor volume status  Cont rosuvastatin

## 2024-11-06 NOTE — H&P
History Of Present Illness  Antonio Willis is a 79 y.o. male presenting with increasing shortness of breath and cough without any sputum production.  79-year-old gentleman with history of interstitial lung disease, follows with Dr. Ledesma, history of multiple sclerosis, previous permanent pacemaker placement, hypertension, chronic kidney disease stage IV, previous stroke with mild residual deficit, previous history of prostate carcinoma s/p radiation treatment, history of nephrolithiasis and history of chronic smoking which she quit about a month ago presents with increasing shortness of breath and cough for last few days.  He is normally on home oxygen 2 to 3 L.  Chest x-ray done in the emergency room revealed bilateral hernia, being with interstitial changes and a questionable pneumonic infiltrate although reviewing his chest x-ray I do not see any evidence of pneumonia.  He was started on IV antibiotics and admitted to my care.  Since admission he has had low normal pressure ranging anywhere between 86 mmHg to 104 mmHg but he is asymptomatic.   not dizzy or lightheaded.  He has a history of COVID infection few months ago but currently has no COVID-related symptoms.     Past Medical History  He has a past medical history of Cardiac pacemaker, Chronic anemia, CKD (chronic kidney disease), stage IV (Multi), Granuloma annulare, Hemiplegia, unspecified affecting right dominant side (Multi), History of GI bleed, History of prostate cancer, Hydronephrosis concurrent with and due to calculi of kidney and ureter, Hyperkalemia, Hyperlipidemia, unspecified, Multiple sclerosis (Multi), Pyuria, Recurrent kidney stones, and Tremor, unspecified.    Surgical History  He has a past surgical history that includes Hernia repair (Right, 1993); US guided abscess drain (11/24/2021); IR placement of nephrostomy catheter (11/24/2021); Prostate biopsy (02/13/2008); ORIF ankle fracture (Right, 2003); Cystoscopy w/ laser lithotripsy  (Left); Extracorporeal shock wave lithotripsy (Left); Bladder stone removal (11/09/2007); and pacemaker placement (10/17/2011).     Social History  He reports that he has been smoking cigars. He has been exposed to tobacco smoke. He does not have any smokeless tobacco history on file. He reports current alcohol use of about 1.0 standard drink of alcohol per week. He reports that he does not use drugs.    Family History  Family History   Problem Relation Name Age of Onset    Leukemia Mother      Heart attack Father      Diabetes Father      Diabetes Sister      Tremor Sister      Leukemia Brother      Parkinsonism Father's Sister          Allergies  Patient has no known allergies.    Review of Systems  COMPLETE REVIEW OF SYSTEMS:    GENERAL: No weight loss, malaise or fevers., SEE HPI  HEENT: Negative for frequent or significant headaches, No changes in hearing or vision, no nose bleeds or other nasal problems  NECK: Negative for lumps, goiter, pain and significant neck swelling  RESPIRATORY: Positive for cough, wheezing and shortness of breath.  CARDIOVASCULAR: Negative for chest pain, leg swelling or palpitations.  GI: Negative for abdominal discomfort, blood in stools or black stools or change in bowel habits  : History of BPH, prostate carcinoma, s/p radiation, history of nephrolithiasis and chronic kidney disease stage IV.  No history of dysuria, frequency or incontinence  MUSCULOSKELETAL: Negative for joint pain or swelling, back pain or muscle pain.  SKIN: Negative for lesions, rash, and itching.  PSYCH: Negative for sleep disturbance, mood disorder and recent psychosocial stressors.  HEMATOLOGY/LYMPHOLOGY: Negative for prolonged bleeding, bruising easily or swollen nodes.  ENDOCRINE: Negative for cold or heat intolerance, polyuria, polydipsia and goiter.  NEURO: History of multiple sclerosis and difficulty ambulation.  No history of headaches, syncope, paralysis, seizures or tremors         Physical  Exam  GENERAL: Awake, alert, moderate distress, cooperative  SKIN: Skin color, texture, turgor normal. No rashes or lesions.  HEAD/SINUSES: No significant findings.  EYES: PERRLA and EOMI  EARS: external ears normal, canals clear, TM's normal  NOSE:  Septum midline.  OROPHARYNX: Lips, mucosa, and tongue normal. Teeth and gums normal. Oropharynx normal.  NECK: no jugulovenous distention, no carotid bruits, carotid pulse normal contour, supple  BACK: Back symmetric, Normal curvature, ROM normal, No CVAT.  LUNGS: Bilateral wheezes and fine crackles posteriorly both lower lung bases and overall fair air exchange, no pleural rub.    CARDIAC: Rate and rhythm is regular, normal S1 and S2; no rubs,  or gallops, 2/6 systolic ejection murmur left sternal border, permanent pacemaker left upper chest, no evidence of overt heart failure.  ABDOMEN: Abdomen soft, non-tender. BS normal. No masses or organomegaly.  EXTREMITIES: Extremities normal. No deformities, edema, clubbing or skin discoloration.  NEURO: Gait not examined. Reflexes normal and symmetric. Sensation grossly intact., Cranial nerves II-XII intact  PULSES: 2+ radial, 2+ carotid  RECTAL: not done      Last Recorded Vitals  BP (!) 86/48 (BP Location: Left arm, Patient Position: Lying)   Pulse 70   Temp 36.3 °C (97.3 °F)   Resp 20   Wt 76.7 kg (169 lb)   SpO2 93%     Relevant Results  Scheduled medications  azithromycin, 500 mg, oral, q24h  cefTRIAXone, 2 g, intravenous, q24h  cholecalciferol, 1,000 Units, oral, Daily  famotidine, 10 mg, oral, q48h  heparin (porcine), 5,000 Units, subcutaneous, q8h PAULETTE  ipratropium-albuteroL, 3 mL, nebulization, TID  methylPREDNISolone sodium succinate (PF), 40 mg, intravenous, q8h  polyethylene glycol, 17 g, oral, Daily  rosuvastatin, 10 mg, oral, Nightly  sodium bicarbonate, 650 mg, oral, BID  sodium zirconium cyclosilicate, 10 g, oral, TID  tamsulosin, 0.4 mg, oral, Nightly      Continuous medications  oxygen, 2 L/min      PRN  medications  PRN medications: acetaminophen, ipratropium-albuteroL, ondansetron **OR** ondansetron   Results for orders placed or performed during the hospital encounter of 11/05/24 (from the past 96 hours)   Sars-CoV-2 PCR   Result Value Ref Range    Coronavirus 2019, PCR Not Detected Not Detected   Influenza A, and B PCR   Result Value Ref Range    Flu A Result Not Detected Not Detected    Flu B Result Not Detected Not Detected   CBC and Auto Differential   Result Value Ref Range    WBC 8.8 4.4 - 11.3 x10*3/uL    nRBC 0.0 0.0 - 0.0 /100 WBCs    RBC 3.62 (L) 4.50 - 5.90 x10*6/uL    Hemoglobin 11.8 (L) 13.5 - 17.5 g/dL    Hematocrit 37.8 (L) 41.0 - 52.0 %     (H) 80 - 100 fL    MCH 32.6 26.0 - 34.0 pg    MCHC 31.2 (L) 32.0 - 36.0 g/dL    RDW 18.9 (H) 11.5 - 14.5 %    Platelets 117 (L) 150 - 450 x10*3/uL    Neutrophils % 77.4 40.0 - 80.0 %    Immature Granulocytes %, Automated 0.7 0.0 - 0.9 %    Lymphocytes % 11.7 13.0 - 44.0 %    Monocytes % 6.2 2.0 - 10.0 %    Eosinophils % 3.7 0.0 - 6.0 %    Basophils % 0.3 0.0 - 2.0 %    Neutrophils Absolute 6.78 (H) 1.60 - 5.50 x10*3/uL    Immature Granulocytes Absolute, Automated 0.06 0.00 - 0.50 x10*3/uL    Lymphocytes Absolute 1.02 0.80 - 3.00 x10*3/uL    Monocytes Absolute 0.54 0.05 - 0.80 x10*3/uL    Eosinophils Absolute 0.32 0.00 - 0.40 x10*3/uL    Basophils Absolute 0.03 0.00 - 0.10 x10*3/uL   Comprehensive metabolic panel   Result Value Ref Range    Glucose 56 (L) 74 - 99 mg/dL    Sodium 148 (H) 136 - 145 mmol/L    Potassium 5.6 (H) 3.5 - 5.3 mmol/L    Chloride 122 (H) 98 - 107 mmol/L    Bicarbonate 19 (L) 21 - 32 mmol/L    Anion Gap 13 10 - 20 mmol/L    Urea Nitrogen 87 (H) 6 - 23 mg/dL    Creatinine 4.98 (H) 0.50 - 1.30 mg/dL    eGFR 11 (L) >60 mL/min/1.73m*2    Calcium 9.9 8.6 - 10.3 mg/dL    Albumin 3.7 3.4 - 5.0 g/dL    Alkaline Phosphatase 293 (H) 33 - 136 U/L    Total Protein 7.6 6.4 - 8.2 g/dL     (H) 9 - 39 U/L    Bilirubin, Total 0.6 0.0 - 1.2  mg/dL     (H) 10 - 52 U/L   Troponin I, High Sensitivity   Result Value Ref Range    Troponin I, High Sensitivity 17 0 - 20 ng/L   B-Type Natriuretic Peptide   Result Value Ref Range     (H) 0 - 99 pg/mL   ECG 12 lead   Result Value Ref Range    Ventricular Rate 64 BPM    Atrial Rate 64 BPM    MO Interval 212 ms    QRS Duration 160 ms    QT Interval 442 ms    QTC Calculation(Bazett) 455 ms    P Axis 48 degrees    R Axis 253 degrees    T Axis 46 degrees    QRS Count 11 beats    Q Onset 184 ms    P Onset 86 ms    P Offset 135 ms    T Offset 405 ms    QTC Fredericia 451 ms   ECG 12 lead    Result Date: 11/6/2024  Atrial-paced rhythm with prolonged AV conduction Right bundle branch block Abnormal ECG When compared with ECG of 02-OCT-2024 10:58, (unconfirmed) No significant change was found    XR chest 2 views    Result Date: 11/5/2024  Interpreted By:  Pepe Scruggs, STUDY: XR CHEST 2 VIEWS;  11/5/2024 7:00 pm   INDICATION: Signs/Symptoms:cough.     COMPARISON: Radiographs of the chest dated 08/25/2024.   ACCESSION NUMBER(S): [id number]   ORDERING CLINICIAN: GEORGE LOPEZ   FINDINGS: PA and lateral radiographs of the chest were provided.   Dual lead left subclavian pacemaker is in place.   CARDIOMEDIASTINAL SILHOUETTE: Cardiomediastinal silhouette is mildly enlarged, similar in appearance to prior exam.   LUNGS: There is redemonstration of the interstitial prominence in the lungs bilaterally, likely representing component of chronic interstitial lung disease, with some increase in airspace opacities in the right upper lung compared to prior study on 08/24/2024. No evidence of pneumothorax or sizable pleural effusion.   ABDOMEN: No remarkable upper abdominal findings.   BONES: No acute osseous changes.       1.  Somewhat increased interstitial markings in the right upper lung compared to prior exam on 08/25/2024, possibly representing developing airspace infiltrate. 2. Prominence of the  interstitial markings in the lungs bilaterally, likely representing component of chronic interstitial lung disease.   MACRO: None   Signed by: Pepe Scruggs 11/5/2024 7:12 PM Dictation workstation:   [id number]    US renal complete    Result Date: 10/11/2024  Interpreted By:  Jagjit Valladares, STUDY: US RENAL COMPLETE;  10/10/2024 11:59 am   INDICATION: Signs/Symptoms:SEE DX.   ,N18.5 Chronic kidney disease, stage 5 (Multi),N13.9 Obstructive and reflux uropathy, unspecified   COMPARISON: 06/2024   ACCESSION NUMBER(S): [id number]   ORDERING CLINICIAN: JAEL BOYCE   TECHNIQUE: Multiple images of the kidneys were obtained  .   FINDINGS: Abnormal examination. Severe left hydronephrosis persists.   Right kidney measures 11.1 cm. Left kidney measures 14 cm. Multiple left-sided stones detected. The largest measures about 1.4 cm.   Cortical thinning seen in both kidneys.   Debris seen layering within the urinary bladder. Postvoid residual 31 cc. Prostate gland measures 4.1 x 4.2 cm.         Severe obstructive nephropathy persists on the left. Left-sided renal stones detected. If further characterization is needed, CT examination is advised to better evaluate the obstructive features. Cortical thinning of both kidneys.   Debris seen in the urinary bladder. Correlation with urinalysis advised.   MACRO: None   Signed by: Jagjit Valladares 10/11/2024 6:01 PM Dictation workstation:   [id number]    Transthoracic echo (TTE) complete    Result Date: 10/11/2024   [facility], [address] [place] [address]              Tel [phone] and Fax [phone] TRANSTHORACIC ECHOCARDIOGRAM REPORT  Patient Name:      DOMINGO SERRANO      Reading Physician:    [id number] Driss Miller MD Study Date:        10/10/2024           Ordering Provider:    [id number] DRISS MILLER MRN/PID:            [id number]             Fellow: Accession#:        [id number]         Nurse: Date of Birth/Age: 1945 / 79 years  Sonographer:          Josefina Delgado RDCS Gender:            M                    Additional Staff: Height:            175.26 cm            Admit Date: Weight:            89.81 kg             Admission Status:     Outpatient BSA / BMI:         2.06 m2 / 29.24      Encounter#:           [id number]                    kg/m2 Blood Pressure:    115/65 mmHg          Department Location:  [facility]                                                               Invasive Study Type:    TRANSTHORACIC ECHO (TTE) COMPLETE Diagnosis/ICD: Shortness of breath-R06.02 Indication:    [facility] CPT Code:      Echo Complete w Full Doppler-[id number] Patient History: Pacer/Defib:       Permanent pacemaker Pertinent History: Hyperlipidemia and Dyslipidemia. Study Detail: The following Echo studies were performed: 2D, M-Mode, Doppler and               color flow. Technically challenging study due to patient lying in               supine position. Unable to obtain suprasternal notch view. Patient               has a pacemaker.  PHYSICIAN INTERPRETATION: Left Ventricle: Left ventricular ejection fraction is normal, by visual estimate at 60-65%. There are no regional left ventricular wall motion abnormalities. The left ventricular cavity size is decreased. Spectral Doppler shows an impaired relaxation pattern of left ventricular diastolic filling. Left Atrium: The left atrium is normal in size. Right Ventricle: The right ventricle is normal in size. There is normal right ventricular global systolic function. A device is visualized in the right ventricle. Right Atrium: The right atrium was not well visualized. There is a device visualized in the right atrium. Aortic Valve: The aortic valve is trileaflet. The aortic valve dimensionless index is 0.88. There is no evidence of aortic valve regurgitation. The peak instantaneous gradient of the  aortic valve is 5.5 mmHg. The mean gradient of the aortic valve is 2.6 mmHg. Mitral Valve: The mitral valve is normal in structure. There is no evidence of mitral valve regurgitation. Tricuspid Valve: The tricuspid valve is structurally normal. There is mild to moderate tricuspid regurgitation. The Doppler estimated RVSP is moderately elevated at 58.6 mmHg. Pulmonic Valve: The pulmonic valve is structurally normal. There is trace pulmonic valve regurgitation. Pericardium: Trivial pericardial effusion. Aorta: The aortic root is normal. In comparison to the previous echocardiogram(s): There are no prior studies on this patient for comparison purposes.  CONCLUSIONS:  1. Left ventricular ejection fraction is normal, by visual estimate at 60-65%.  2. Spectral Doppler shows an impaired relaxation pattern of left ventricular diastolic filling.  3. Left ventricular cavity size is decreased.  4. There is normal right ventricular global systolic function.  5. Mild to moderate tricuspid regurgitation visualized.  6. Moderately elevated right ventricular systolic pressure. QUANTITATIVE DATA SUMMARY:  2D MEASUREMENTS:          Normal Ranges: LAs:             2.18 cm  (2.7-4.0cm) IVSd:            1.02 cm  (0.6-1.1cm) LVPWd:           0.99 cm  (0.6-1.1cm) LVIDd:           3.63 cm  (3.9-5.9cm) LVIDs:           2.42 cm LV Mass Index:   53 g/m2 LVEDV Index:     30 ml/m2 LV % FS          33.2 %  LA VOLUME:                    Normal Ranges: LA Vol A4C:        21.1 ml    (22+/-6mL/m2) LA Vol A2C:        29.5 ml LA Vol BP:         26.8 ml LA Vol Index A4C:  10.3ml/m2 LA Vol Index A2C:  14.3 ml/m2 LA Vol Index BP:   13.0 ml/m2 LA Area A4C:       12.1 cm2 LA Area A2C:       13.3 cm2 LA Major Axis A4C: 5.9 cm LA Major Axis A2C: 5.1 cm LA Vol A4C:        19.2 ml LA Vol A2C:        27.9 ml LA Vol Index BSA:  11.5 ml/m2  RA VOLUME BY A/L METHOD:            Normal Ranges: RA Vol A4C:              44.8 ml    (8.3-19.5ml) RA Vol Index A4C:         21.8 ml/m2 RA Area A4C:             16.4 cm2 RA Major Axis A4C:       5.1 cm  M-MODE MEASUREMENTS:         Normal Ranges: Ao Root:             3.10 cm (2.0-3.7cm) LAs:                 2.43 cm (2.7-4.0cm)  AORTA MEASUREMENTS:         Normal Ranges: Ao Sinus, d:        3.50 cm (2.1-3.5cm) Ao STJ, d:          3.00 cm (1.7-3.4cm) Asc Ao, d:          3.60 cm (2.1-3.4cm)  LV SYSTOLIC FUNCTION BY 2D PLANIMETRY (MOD):                      Normal Ranges: EF-A4C View:    74 % (>=55%) EF-A2C View:    66 % EF-Biplane:     68 % EF-Visual:      63 % LV EF Reported: 63 %  LV DIASTOLIC FUNCTION:             Normal Ranges: MV Peak E:             0.56 m/s    (0.7-1.2 m/s) MV Peak A:             0.90 m/s    (0.42-0.7 m/s) E/A Ratio:             0.62        (1.0-2.2) MV e'                  0.045 m/s   (>8.0) MV lateral e'          0.05 m/s MV medial e'           0.04 m/s MV A Dur:              140.72 msec E/e' Ratio:            12.37       (<8.0) a'                     0.04 m/s PulmV Sys Johny:         34.13 cm/s PulmV Lima Johny:        28.46 cm/s PulmV S/D Johny:         1.20 PulmV A Revs Johny:      25.84 cm/s PulmV A Revs Dur:      138.41 msec  MITRAL VALVE:          Normal Ranges: MV DT:        313 msec (150-240msec)  AORTIC VALVE:                     Normal Ranges: AoV Vmax:                1.17 m/s (<=1.7m/s) AoV Peak P.5 mmHg (<20mmHg) AoV Mean P.6 mmHg (1.7-11.5mmHg) LVOT Max Johny:            1.08 m/s (<=1.1m/s) AoV VTI:                 24.04 cm (18-25cm) LVOT VTI:                21.14 cm LVOT Diameter:           1.99 cm  (1.8-2.4cm) AoV Area, VTI:           2.73 cm2 (2.5-5.5cm2) AoV Area,Vmax:           2.86 cm2 (2.5-4.5cm2) AoV Dimensionless Index: 0.88  RIGHT VENTRICLE: TAPSE: 20.0 mm RV s'  0.10 m/s  TRICUSPID VALVE/RVSP:          Normal Ranges: Peak TR Velocity:     3.73 m/s Est. RA Pressure:     3 mmHg RV Syst Pressure:     59 mmHg  (< 30mmHg) IVC Diam:             1.40 cm  PULMONIC VALVE:           Normal Ranges: RVOT Vmax:      0.45 m/s (0.6-0.9m/s) PV Max Johny:     0.8 m/s  (0.6-0.9m/s) PV Max P.4 mmHg  Pulmonary Veins: PulmV A Revs Dur: 138.41 msec PulmV A Revs Johny: 25.84 cm/s PulmV Lima Johny:   28.46 cm/s PulmV S/D Johny:    1.20 PulmV Sys Johny:    34.13 cm/s  AORTA: Asc Ao Diam 3.61 cm  [id number] Driss Miller MD Electronically signed on 10/11/2024 at 2:38:23 PM  ** Final **           Assessment/Plan   Assessment & Plan  Pneumonia of right upper lobe due to infectious organism  79-year-old gentleman with history of multiple sclerosis, without significant neurological deficit, history of previous permanent pacemaker placement, hypertension, recent COVID infection about 2 months ago, interstitial lung disease, chronic smoking which he quit about a month ago, history of prostate carcinoma in the past s/p radiation treatment and history of BPH and nephrolithiasis, patient is on home oxygen for interstitial lung disease, presents with increasing shortness of breath and cough.  He normally uses 2 to 3 L of nasal cannula oxygen at home.  His chest x-ray is consistent with interstitial lung disease, although he is admitted with a diagnosis of pneumonia but chest x-ray revealed no evidence of consolidation.  He has had a high-resolution CT in 2024 which is consistent with interstitial lung disease and significant emphysema.    1.  Acute exacerbation of chronic lung disease with wheezing and shortness of breath and questionable pneumonic infiltrate and history of interstitial lung disease.  White cell count is normal, chest x-ray revealed interstitial lung disease without definite consolidation.  Patient started on IV ceftriaxone and azithromycin  He does have a history of smoking and there is some element of COPD exacerbation therefore I have added Solu-Medrol 40 mg every 8 hours and I have also requested a pulmonary consultation who have seen the patient and they concur with the treatment.  To continue  with oxygen to maintain his saturation above 92%.    2.  Low normal blood pressures, asymptomatic, serum troponin is negative and EKG reveals no acute ischemic changes, will monitor closely.  No evidence of overt heart failure.  Most recent echocardiogram was in October 2024 which revealed normal left ventricular systolic function elevated right ventricular systolic pressure and diastolic dysfunction.  Will monitor blood pressure closely    3.  History of COVID infection few months ago currently has no symptoms related to COVID.    4.  Chronic kidney disease stage IV with a serum potassium of 5.6 and serum creatinine of 4.6 on admission.  Hyperkalemia has been treated and repeat potassium is 4.6 today.  Patient follows with Dr. Higgins as an outpatient.    5.  Previous history of prostate carcinoma s/p radiation and history of BPH and nephrolithiasis, patient had taken a second urological opinion at [facility] and then he came back to  and has seen Dr. Buckner who had advised him to get CT of the abdomen and pelvis which I had ordered this afternoon.  Incidentally he was supposed to get CT of abdomen pelvis this afternoon as an outpatient.    6.  Hyperlipidemia continue with current medications    Reviewed all labs and imaging studies and discussed the plan of treatment with patient in detail.    Total time spent in examination counseling coordinating care for this patient was greater than 75 minutes.       Rome Kerns MD

## 2024-11-06 NOTE — ED PROVIDER NOTES
HPI   Chief Complaint   Patient presents with    Shortness of Breath       This is an 79-year-old male who presents to the emergency department complaining of shortness of breath.  Symptoms have been present for 2 and half days.  The patient has a cough that is productive of yellow mucus.  Today his pulse ox was in the 70s on his usual 2 L.  It was increased to 3 L with improvement to 88%.  At this point his pulmonologist was contacted and recommended evaluation in the emergency department.  The patient denies fever.  He denies abdominal pain.  He does report mild swelling in the ankles.    Past medical history: CKD, pacemaker, COPD, hypertension              Patient History   Past Medical History:   Diagnosis Date    Cardiac pacemaker     Implanted 10/17/2011 due to SSS    Chronic anemia     CKD (chronic kidney disease), stage IV (Multi)     Granuloma annulare     Hemiplegia, unspecified affecting right dominant side (Multi)     Right Hemiparesis from MS    History of GI bleed     UGI due to gastric and duodenal ulcer    History of prostate cancer     dx 02/13/2008; treated with hormone tx and external beam radiation    Hydronephrosis concurrent with and due to calculi of kidney and ureter     Recurrent episodes on the LEFT    Hyperkalemia     Due to CKD; followed by Dr North Higgins    Hyperlipidemia, unspecified     Multiple sclerosis (Multi)     Dx 2011    Pyuria     Chronic    Recurrent kidney stones     Left side    Tremor, unspecified      Past Surgical History:   Procedure Laterality Date    BLADDER STONE REMOVAL  11/09/2007    Cystolithalopaxy    CYSTOSCOPY W/ LASER LITHOTRIPSY Left     12/3/20, 5/6/21, 5/20/21, 8/26/21, 8/24/23    EXTRACORPOREAL SHOCK WAVE LITHOTRIPSY Left     10/01/20, 10/29/20    HERNIA REPAIR Right 1993    Right inguinal hernia repair    IR  NEPHROSTOMY PLACEMENT  11/24/2021    IR  NEPHROSTOMY PLACEMENT 11/24/2021 [facility]    ORIF ANKLE FRACTURE Right 2003    PACEMAKER  PLACEMENT  10/17/2011    PROSTATE BIOPSY  02/13/2008    + cancer    US GUIDED ABSCESS DRAIN  11/24/2021    US GUIDED ABSCESS DRAIN 11/24/2021 [facility]     Family History   Problem Relation Name Age of Onset    Leukemia Mother      Heart attack Father      Diabetes Father      Diabetes Sister      Tremor Sister      Leukemia Brother      Parkinsonism Father's Sister       Social History     Tobacco Use    Smoking status: Every Day     Types: Cigars     Passive exposure: Current    Smokeless tobacco: Not on file    Tobacco comments:     Has not smoked in the last 3-4 weeks per patient wife   Vaping Use    Vaping status: Never Used   Substance Use Topics    Alcohol use: Yes     Alcohol/week: 1.0 standard drink of alcohol     Types: 1 Glasses of wine per week     Comment: 2 month    Drug use: Never       Physical Exam   ED Triage Vitals [11/05/24 1739]   Temperature Heart Rate Respirations BP   36.1 °C (97 °F) 63 20 107/62      Pulse Ox Temp src Heart Rate Source Patient Position   (!) 90 % -- -- --      BP Location FiO2 (%)     -- --       Physical Exam  Vitals and nursing note reviewed.   HENT:      Head: Normocephalic and atraumatic.      Nose: Nose normal.   Eyes:      Conjunctiva/sclera: Conjunctivae normal.   Cardiovascular:      Rate and Rhythm: Normal rate and regular rhythm.      Pulses: Normal pulses.      Heart sounds: Normal heart sounds.   Pulmonary:      Effort: Pulmonary effort is normal.      Breath sounds: Decreased breath sounds present.   Abdominal:      General: Bowel sounds are normal.      Palpations: Abdomen is soft.   Musculoskeletal:         General: Normal range of motion.      Cervical back: Normal range of motion and neck supple.      Right lower leg: Edema present.      Left lower leg: Edema present.      Comments: Trace ankle edema bilaterally   Skin:     Findings: No rash.   Neurological:      General: No focal deficit present.      Mental Status: He is alert and oriented to person,  place, and time.   Psychiatric:         Mood and Affect: Mood normal.           ED Course & MDM   Diagnoses as of 11/05/24 2056   Pneumonia of right upper lobe due to infectious organism   Hypoglycemia   Chronic kidney disease, unspecified CKD stage                 No data recorded     [place] Coma Scale Score: 15 (11/05/24 1740 : Kyra Kathryn Behnfeldt, OLY)                           Medical Decision Making  Differential diagnosis: I have considered the following conditions in my assessment of this patient's condition:  COPD, asthma, CHF, DKA, ACS, pneumonia, pneumothorax, pulmonary embolus, sepsis, bronchitis, foreign body aspiration.    This is an 79-year-old male who presents to the emergency department with shortness of breath and increased oxygen requirement.  The patient was evaluated with labs, viral swabs and chest x-ray.  The patient's labs showed low blood sugar at 56.  The patient was given ginger ale and crackers.  Creatinine was elevated at 4.98.  This is similar to his previous values.  Troponin was negative at 17.  White blood count was normal at 8.8.  Viral swabs were negative.  Chest x-ray shows new infiltrate in the right upper lobe.  The patient was treated for community-acquired pneumonia with ceftriaxone and azithromycin.  Dr. Kerns was contacted and accepted the patient for admission.    Amount and/or Complexity of Data Reviewed  Radiology: independent interpretation performed.     Details: Atrially paced, heart rate 64, right bundle branch block.  Right bundle branch block present on previous EKG as well.        Procedure  Procedures     Randell Flores MD  11/05/24 2119

## 2024-11-06 NOTE — PROGRESS NOTES
Physical Therapy                 Therapy Communication Note    Patient Name: Antonio Willis  MRN: [id number]  Department: [place] [address]  Room: Room/bed info not found  Today's Date: 11/6/2024     Discipline: Physical Therapy      Missed Time: Cancel

## 2024-11-06 NOTE — PROGRESS NOTES
11/06/24 1022   Discharge Planning   Living Arrangements Spouse/significant other   Support Systems Spouse/significant other   Assistance Needed has aide 4 hrs 4x a week, relies on others for some care   Type of Residence Private residence   Who is requesting discharge planning? Provider   Home or Post Acute Services None   Expected Discharge Disposition Home   Does the patient need discharge transport arranged? No   Financial Resource Strain   How hard is it for you to pay for the very basics like food, housing, medical care, and heating? Not hard   Housing Stability   In the last 12 months, was there a time when you were not able to pay the mortgage or rent on time? N   In the past 12 months, how many times have you moved where you were living? 0   At any time in the past 12 months, were you homeless or living in a shelter (including now)? N   Transportation Needs   In the past 12 months, has lack of transportation kept you from medical appointments or from getting medications? no   In the past 12 months, has lack of transportation kept you from meetings, work, or from getting things needed for daily living? No     Met with patient at bedside  Explained role of TCC  Patient admitted for SOB    Patient states he uses home o2 most of the time  Has MS but is able to get self in motorized wheelchair  Has aide 4 days a week for 4 hrs a day  Wife assists as needed also  Aide or wife provide transport as needed  Goes to outpt therapy for pt/ot at  on [place], plans to resume outpt therapy after dc  Lives in apartment at the [facility], has single floor living    ADOD 1-3 days  BARRIERS wean o2 as able, wheezing  DISPO home with wife and outpt therapy

## 2024-11-07 ENCOUNTER — APPOINTMENT (OUTPATIENT)
Dept: RADIOLOGY | Facility: HOSPITAL | Age: 79
End: 2024-11-07
Payer: MEDICARE

## 2024-11-07 ENCOUNTER — APPOINTMENT (OUTPATIENT)
Dept: OCCUPATIONAL THERAPY | Facility: CLINIC | Age: 79
End: 2024-11-07
Payer: MEDICARE

## 2024-11-07 ENCOUNTER — APPOINTMENT (OUTPATIENT)
Dept: PRIMARY CARE | Facility: CLINIC | Age: 79
End: 2024-11-07
Payer: MEDICARE

## 2024-11-07 PROBLEM — J81.0 ACUTE PULMONARY EDEMA: Status: ACTIVE | Noted: 2024-11-07

## 2024-11-07 PROBLEM — J84.9 INTERSTITIAL LUNG DISEASE (MULTI): Status: ACTIVE | Noted: 2024-11-07

## 2024-11-07 PROBLEM — J96.21 ACUTE ON CHRONIC HYPOXIC RESPIRATORY FAILURE (MULTI): Status: ACTIVE | Noted: 2024-11-07

## 2024-11-07 LAB
ANION GAP SERPL CALC-SCNC: 13 MMOL/L (ref 10–20)
BASOPHILS # BLD AUTO: 0.01 X10*3/UL (ref 0–0.1)
BASOPHILS NFR BLD AUTO: 0.1 %
BUN SERPL-MCNC: 78 MG/DL (ref 6–23)
CALCIUM SERPL-MCNC: 9.2 MG/DL (ref 8.6–10.3)
CHLORIDE SERPL-SCNC: 126 MMOL/L (ref 98–107)
CO2 SERPL-SCNC: 15 MMOL/L (ref 21–32)
CREAT SERPL-MCNC: 4.59 MG/DL (ref 0.5–1.3)
EGFRCR SERPLBLD CKD-EPI 2021: 12 ML/MIN/1.73M*2
EOSINOPHIL # BLD AUTO: 0 X10*3/UL (ref 0–0.4)
EOSINOPHIL NFR BLD AUTO: 0 %
ERYTHROCYTE [DISTWIDTH] IN BLOOD BY AUTOMATED COUNT: 19.2 % (ref 11.5–14.5)
GLUCOSE SERPL-MCNC: 154 MG/DL (ref 74–99)
HCT VFR BLD AUTO: 33.2 % (ref 41–52)
HGB BLD-MCNC: 10.3 G/DL (ref 13.5–17.5)
IMM GRANULOCYTES # BLD AUTO: 0.05 X10*3/UL (ref 0–0.5)
IMM GRANULOCYTES NFR BLD AUTO: 0.7 % (ref 0–0.9)
LACTATE SERPL-SCNC: 1.6 MMOL/L (ref 0.4–2)
LYMPHOCYTES # BLD AUTO: 0.34 X10*3/UL (ref 0.8–3)
LYMPHOCYTES NFR BLD AUTO: 4.7 %
MCH RBC QN AUTO: 32.6 PG (ref 26–34)
MCHC RBC AUTO-ENTMCNC: 31 G/DL (ref 32–36)
MCV RBC AUTO: 105 FL (ref 80–100)
MONOCYTES # BLD AUTO: 0.03 X10*3/UL (ref 0.05–0.8)
MONOCYTES NFR BLD AUTO: 0.4 %
NEUTROPHILS # BLD AUTO: 6.83 X10*3/UL (ref 1.6–5.5)
NEUTROPHILS NFR BLD AUTO: 94.1 %
NRBC BLD-RTO: 0 /100 WBCS (ref 0–0)
PLATELET # BLD AUTO: 94 X10*3/UL (ref 150–450)
POTASSIUM SERPL-SCNC: 5.3 MMOL/L (ref 3.5–5.3)
RBC # BLD AUTO: 3.16 X10*6/UL (ref 4.5–5.9)
SODIUM SERPL-SCNC: 149 MMOL/L (ref 136–145)
WBC # BLD AUTO: 7.3 X10*3/UL (ref 4.4–11.3)

## 2024-11-07 PROCEDURE — 80048 BASIC METABOLIC PNL TOTAL CA: CPT | Performed by: INTERNAL MEDICINE

## 2024-11-07 PROCEDURE — 36415 COLL VENOUS BLD VENIPUNCTURE: CPT | Performed by: INTERNAL MEDICINE

## 2024-11-07 PROCEDURE — 36415 COLL VENOUS BLD VENIPUNCTURE: CPT | Performed by: NURSE PRACTITIONER

## 2024-11-07 PROCEDURE — 99232 SBSQ HOSP IP/OBS MODERATE 35: CPT | Performed by: INTERNAL MEDICINE

## 2024-11-07 PROCEDURE — 2500000001 HC RX 250 WO HCPCS SELF ADMINISTERED DRUGS (ALT 637 FOR MEDICARE OP): Performed by: PHARMACIST

## 2024-11-07 PROCEDURE — 2500000004 HC RX 250 GENERAL PHARMACY W/ HCPCS (ALT 636 FOR OP/ED): Performed by: INTERNAL MEDICINE

## 2024-11-07 PROCEDURE — 99223 1ST HOSP IP/OBS HIGH 75: CPT | Performed by: INTERNAL MEDICINE

## 2024-11-07 PROCEDURE — 2500000002 HC RX 250 W HCPCS SELF ADMINISTERED DRUGS (ALT 637 FOR MEDICARE OP, ALT 636 FOR OP/ED): Performed by: INTERNAL MEDICINE

## 2024-11-07 PROCEDURE — 99232 SBSQ HOSP IP/OBS MODERATE 35: CPT

## 2024-11-07 PROCEDURE — 2500000001 HC RX 250 WO HCPCS SELF ADMINISTERED DRUGS (ALT 637 FOR MEDICARE OP): Performed by: INTERNAL MEDICINE

## 2024-11-07 PROCEDURE — 83605 ASSAY OF LACTIC ACID: CPT | Performed by: NURSE PRACTITIONER

## 2024-11-07 PROCEDURE — 71045 X-RAY EXAM CHEST 1 VIEW: CPT

## 2024-11-07 PROCEDURE — 9420000001 HC RT PATIENT EDUCATION 5 MIN

## 2024-11-07 PROCEDURE — 94640 AIRWAY INHALATION TREATMENT: CPT

## 2024-11-07 PROCEDURE — 85025 COMPLETE CBC W/AUTO DIFF WBC: CPT | Performed by: INTERNAL MEDICINE

## 2024-11-07 PROCEDURE — 2500000005 HC RX 250 GENERAL PHARMACY W/O HCPCS: Performed by: INTERNAL MEDICINE

## 2024-11-07 PROCEDURE — 94668 MNPJ CHEST WALL SBSQ: CPT

## 2024-11-07 PROCEDURE — 71045 X-RAY EXAM CHEST 1 VIEW: CPT | Performed by: RADIOLOGY

## 2024-11-07 PROCEDURE — 1100000001 HC PRIVATE ROOM DAILY

## 2024-11-07 PROCEDURE — 87040 BLOOD CULTURE FOR BACTERIA: CPT | Mod: AHULAB | Performed by: INTERNAL MEDICINE

## 2024-11-07 PROCEDURE — 99233 SBSQ HOSP IP/OBS HIGH 50: CPT | Performed by: INTERNAL MEDICINE

## 2024-11-07 RX ORDER — FUROSEMIDE 10 MG/ML
80 INJECTION INTRAMUSCULAR; INTRAVENOUS ONCE
Status: COMPLETED | OUTPATIENT
Start: 2024-11-07 | End: 2024-11-07

## 2024-11-07 RX ADMIN — IPRATROPIUM BROMIDE AND ALBUTEROL SULFATE 3 ML: 2.5; .5 SOLUTION RESPIRATORY (INHALATION) at 14:18

## 2024-11-07 RX ADMIN — METHYLPREDNISOLONE SODIUM SUCCINATE 40 MG: 40 INJECTION, POWDER, FOR SOLUTION INTRAMUSCULAR; INTRAVENOUS at 11:58

## 2024-11-07 RX ADMIN — TAMSULOSIN HYDROCHLORIDE 0.4 MG: 0.4 CAPSULE ORAL at 20:12

## 2024-11-07 RX ADMIN — SODIUM BICARBONATE 650 MG: 650 TABLET ORAL at 20:12

## 2024-11-07 RX ADMIN — HEPARIN SODIUM 5000 UNITS: 5000 INJECTION, SOLUTION INTRAVENOUS; SUBCUTANEOUS at 05:42

## 2024-11-07 RX ADMIN — Medication 2 L/MIN: at 19:36

## 2024-11-07 RX ADMIN — POLYETHYLENE GLYCOL 3350 17 G: 17 POWDER, FOR SOLUTION ORAL at 09:01

## 2024-11-07 RX ADMIN — FAMOTIDINE 10 MG: 20 TABLET, FILM COATED ORAL at 20:21

## 2024-11-07 RX ADMIN — Medication 1000 UNITS: at 09:01

## 2024-11-07 RX ADMIN — METHYLPREDNISOLONE SODIUM SUCCINATE 40 MG: 40 INJECTION, POWDER, FOR SOLUTION INTRAMUSCULAR; INTRAVENOUS at 00:25

## 2024-11-07 RX ADMIN — SODIUM BICARBONATE 650 MG: 650 TABLET ORAL at 09:01

## 2024-11-07 RX ADMIN — Medication 2 L/MIN: at 08:01

## 2024-11-07 RX ADMIN — CEFTRIAXONE 2 G: 2 INJECTION, POWDER, FOR SOLUTION INTRAMUSCULAR; INTRAVENOUS at 20:12

## 2024-11-07 RX ADMIN — IPRATROPIUM BROMIDE AND ALBUTEROL SULFATE 3 ML: 2.5; .5 SOLUTION RESPIRATORY (INHALATION) at 19:36

## 2024-11-07 RX ADMIN — HEPARIN SODIUM 5000 UNITS: 5000 INJECTION, SOLUTION INTRAVENOUS; SUBCUTANEOUS at 14:48

## 2024-11-07 RX ADMIN — ROSUVASTATIN CALCIUM 10 MG: 10 TABLET, FILM COATED ORAL at 20:12

## 2024-11-07 RX ADMIN — IPRATROPIUM BROMIDE AND ALBUTEROL SULFATE 3 ML: 2.5; .5 SOLUTION RESPIRATORY (INHALATION) at 08:01

## 2024-11-07 RX ADMIN — FUROSEMIDE 80 MG: 10 INJECTION, SOLUTION INTRAMUSCULAR; INTRAVENOUS at 11:59

## 2024-11-07 RX ADMIN — AZITHROMYCIN DIHYDRATE 500 MG: 500 TABLET ORAL at 20:12

## 2024-11-07 ASSESSMENT — COGNITIVE AND FUNCTIONAL STATUS - GENERAL
DAILY ACTIVITIY SCORE: 18
MOVING FROM LYING ON BACK TO SITTING ON SIDE OF FLAT BED WITH BEDRAILS: A LOT
DRESSING REGULAR LOWER BODY CLOTHING: A LITTLE
HELP NEEDED FOR BATHING: A LITTLE
CLIMB 3 TO 5 STEPS WITH RAILING: A LOT
MOBILITY SCORE: 12
CLIMB 3 TO 5 STEPS WITH RAILING: A LOT
STANDING UP FROM CHAIR USING ARMS: A LOT
MOVING TO AND FROM BED TO CHAIR: A LOT
MOVING FROM LYING ON BACK TO SITTING ON SIDE OF FLAT BED WITH BEDRAILS: A LOT
TURNING FROM BACK TO SIDE WHILE IN FLAT BAD: A LOT
TURNING FROM BACK TO SIDE WHILE IN FLAT BAD: A LOT
PERSONAL GROOMING: A LITTLE
STANDING UP FROM CHAIR USING ARMS: A LOT
TOILETING: A LOT
MOBILITY SCORE: 12
DRESSING REGULAR UPPER BODY CLOTHING: A LITTLE
WALKING IN HOSPITAL ROOM: A LOT
WALKING IN HOSPITAL ROOM: A LOT
MOVING TO AND FROM BED TO CHAIR: A LOT

## 2024-11-07 ASSESSMENT — PAIN SCALES - GENERAL
PAINLEVEL_OUTOF10: 0 - NO PAIN
PAINLEVEL_OUTOF10: 0 - NO PAIN

## 2024-11-07 ASSESSMENT — PAIN - FUNCTIONAL ASSESSMENT
PAIN_FUNCTIONAL_ASSESSMENT: 0-10
PAIN_FUNCTIONAL_ASSESSMENT: 0-10

## 2024-11-07 NOTE — CARE PLAN
The patient's goals for the shift include sleep    The clinical goals for the shift include pt sob and cough improve    Problem: Pain - Adult  Goal: Verbalizes/displays adequate comfort level or baseline comfort level  Outcome: Progressing     Problem: Safety - Adult  Goal: Free from fall injury  Outcome: Progressing     Problem: Discharge Planning  Goal: Discharge to home or other facility with appropriate resources  Outcome: Progressing     Problem: Chronic Conditions and Co-morbidities  Goal: Patient's chronic conditions and co-morbidity symptoms are monitored and maintained or improved  Outcome: Progressing

## 2024-11-07 NOTE — PROGRESS NOTES
"Antonio Willis is a 79 y.o. male on day 2 of admission presenting with Pneumonia of right upper lobe due to infectious organism.    Subjective   Received 500mL IV fluid bolus yesterday evening. This morning was more hypoxic and agitated. CXR showed increased pulmonary vascular congestion.        Objective   GEN: appears agitated and is picking at various tubes and lines. Breathing more labored  ENT: wearing O2 nasal cannula  CV: RRR, no m/g/r  LUNGS: moderate effort, bilateral crackles and rhonchi  EXT: no edema, cyanosis, clubbing    Physical Exam    Last Recorded Vitals  Blood pressure (!) 115/49, pulse 75, temperature 36.2 °C (97.2 °F), temperature source Temporal, resp. rate 18, height 1.651 m (5' 5\"), weight 76.7 kg (169 lb), SpO2 93%.  Intake/Output last 3 Shifts:  I/O last 3 completed shifts:  In: 345 (4.5 mL/kg) [P.O.:345]  Out: 751 (9.8 mL/kg) [Urine:750 (0.3 mL/kg/hr); Emesis/NG output:1]  Weight: 76.7 kg     Relevant Results  Scheduled medications  azithromycin, 500 mg, oral, q24h  cefTRIAXone, 2 g, intravenous, q24h  cholecalciferol, 1,000 Units, oral, Daily  famotidine, 10 mg, oral, q48h  heparin (porcine), 5,000 Units, subcutaneous, q8h PAULETTE  ipratropium-albuteroL, 3 mL, nebulization, TID  methylPREDNISolone sodium succinate (PF), 40 mg, intravenous, q12h  polyethylene glycol, 17 g, oral, Daily  rosuvastatin, 10 mg, oral, Nightly  sodium bicarbonate, 650 mg, oral, BID  tamsulosin, 0.4 mg, oral, Nightly      Continuous medications  oxygen, 2 L/min      PRN medications  PRN medications: acetaminophen, ipratropium-albuteroL, ondansetron **OR** ondansetron    Results for orders placed or performed during the hospital encounter of 11/05/24 (from the past 24 hours)   Basic metabolic panel   Result Value Ref Range    Glucose 125 (H) 74 - 99 mg/dL    Sodium 149 (H) 136 - 145 mmol/L    Potassium 4.6 3.5 - 5.3 mmol/L    Chloride 124 (H) 98 - 107 mmol/L    Bicarbonate 16 (L) 21 - 32 mmol/L    Anion Gap 14 10 - 20 " mmol/L    Urea Nitrogen 81 (H) 6 - 23 mg/dL    Creatinine 4.70 (H) 0.50 - 1.30 mg/dL    eGFR 12 (L) >60 mL/min/1.73m*2    Calcium 9.2 8.6 - 10.3 mg/dL   Basic metabolic panel   Result Value Ref Range    Glucose 154 (H) 74 - 99 mg/dL    Sodium 149 (H) 136 - 145 mmol/L    Potassium 5.3 3.5 - 5.3 mmol/L    Chloride 126 (H) 98 - 107 mmol/L    Bicarbonate 15 (L) 21 - 32 mmol/L    Anion Gap 13 10 - 20 mmol/L    Urea Nitrogen 78 (H) 6 - 23 mg/dL    Creatinine 4.59 (H) 0.50 - 1.30 mg/dL    eGFR 12 (L) >60 mL/min/1.73m*2    Calcium 9.2 8.6 - 10.3 mg/dL   CBC and Auto Differential   Result Value Ref Range    WBC 7.3 4.4 - 11.3 x10*3/uL    nRBC 0.0 0.0 - 0.0 /100 WBCs    RBC 3.16 (L) 4.50 - 5.90 x10*6/uL    Hemoglobin 10.3 (L) 13.5 - 17.5 g/dL    Hematocrit 33.2 (L) 41.0 - 52.0 %     (H) 80 - 100 fL    MCH 32.6 26.0 - 34.0 pg    MCHC 31.0 (L) 32.0 - 36.0 g/dL    RDW 19.2 (H) 11.5 - 14.5 %    Platelets 94 (L) 150 - 450 x10*3/uL    Neutrophils % 94.1 40.0 - 80.0 %    Immature Granulocytes %, Automated 0.7 0.0 - 0.9 %    Lymphocytes % 4.7 13.0 - 44.0 %    Monocytes % 0.4 2.0 - 10.0 %    Eosinophils % 0.0 0.0 - 6.0 %    Basophils % 0.1 0.0 - 2.0 %    Neutrophils Absolute 6.83 (H) 1.60 - 5.50 x10*3/uL    Immature Granulocytes Absolute, Automated 0.05 0.00 - 0.50 x10*3/uL    Lymphocytes Absolute 0.34 (L) 0.80 - 3.00 x10*3/uL    Monocytes Absolute 0.03 (L) 0.05 - 0.80 x10*3/uL    Eosinophils Absolute 0.00 0.00 - 0.40 x10*3/uL    Basophils Absolute 0.01 0.00 - 0.10 x10*3/uL   Lactate   Result Value Ref Range    Lactate 1.6 0.4 - 2.0 mmol/L                                     Assessment/Plan   Assessment & Plan  Pneumonia of right upper lobe due to infectious organism    Acute pulmonary edema    Acute on chronic hypoxic respiratory failure (Multi)    Interstitial lung disease (Multi)    Summary:  79 y.o. male admitted on 11/5/2024  8:13 PM for worsening dyspnea, weakness and sputum production. Lab work is negative for infectious  process, however, he does have some increased opacification on his chest x-ray which may be suggestive for community-acquired pneumonia.  He is currently requiring 3 L/min O2 supplementation.  He also states that he feels more weak which could be secondary to an MS flareup.  This could be a combination of CAP vs ILD flare. Today he is more  hypoxic and short of breath. Received IV fluids yesterday evening and CXR today with pulmonary edema      Recommendations:  -Recommend IV diuretics.  -Agree with current antibiotic regimen for possible CAP. Check urine Strep and Legionella Ag.  -Agree with IV steroids. Will switch to once daily. This may be contributing to some of his agitation  -O2 supplementation to keep SpO2 90-92%. Currently increased to 5L/min  -Advised to find a pulmonologist in [place] as they will be living there for the next 5 months  -Incentive spirometry  -Bronchopulmonary hygiene  -Bronchodilators  -Discussed with Dr. Saumya Mcnally, DO  Pulmonary & Critical Care  11/7/2024 12:40 PM

## 2024-11-07 NOTE — PROGRESS NOTES
Care Coordinator Note:    Plan: Patient in with PNA. COPD. Hypoxia this am now on 5 liters NC. Pulm following- iv steroids as well as iv atb. Cardio following.     Status: inpatient  Payor: [employer]  Disposition: Home no needs with wife. Will continue outpatient therapy. Wife will transport home  Barrier: Steroids, increased o2 needs. atb  ADOD: few days    Alix Amezquita [facility]      11/07/24 1404   Discharge Planning   Expected Discharge Disposition Home

## 2024-11-07 NOTE — PROGRESS NOTES
Antonio Willis is a 79 y.o. male on day 2 of admission presenting with Pneumonia of right upper lobe due to infectious organism.      Subjective   Patient seen and examined, awake, alert, and confused. He was confused but conversational. Noticed hypoxia, dropped Spo2 to 88%. Supplemental oxygen increased to 5L/NC.   Denies fever, chills, chest pain, shortness of breath, and nausea/vomiting/diarrhea.       Objective     Last Recorded Vitals  /62 (BP Location: Right arm)   Pulse 65   Temp 36.4 °C (97.5 °F) (Temporal)   Resp 18   Wt 76.7 kg (169 lb)   SpO2 94%   Intake/Output last 3 Shifts:    Intake/Output Summary (Last 24 hours) at 11/7/2024 1812  Last data filed at 11/7/2024 0500  Gross per 24 hour   Intake 120 ml   Output 501 ml   Net -381 ml       Admission Weight  Weight: 76.7 kg (169 lb) (11/05/24 1739)    Daily Weight  11/05/24 : 76.7 kg (169 lb)    Image Results  XR chest 1 view  Narrative: Interpreted By:  Marek Fenton,   STUDY:  XR CHEST 1 VIEW      INDICATION:  Signs/Symptoms:worsening hypoxia.      COMPARISON:  November 5, 2024      ACCESSION NUMBER(S):  [id number]      ORDERING CLINICIAN:  ALFREDA HORN      FINDINGS:  Cardiomegaly with pacemaker.      Interstitial changes throughout both lungs right worse than left  favoring chronic interstitial disease.      Overall appearance unchanged from prior.      Impression:     Interstitial changes throughout both lungs right worse than left  favoring chronic interstitial disease.      Overall appearance unchanged from prior.      Signed by: Marek Fenton 11/7/2024 11:36 AM  Dictation workstation:   [id number]      Physical Exam  Constitutional:       General: He is not in acute distress.     Appearance: He is not diaphoretic.   Cardiovascular:      Rate and Rhythm: Normal rate and regular rhythm.      Pulses: Normal pulses.      Heart sounds: Normal heart sounds.   Pulmonary:      Effort: Pulmonary effort is normal. No respiratory distress.       Breath sounds: Wheezing present.   Chest:      Chest wall: No tenderness.   Abdominal:      General: Bowel sounds are normal. There is no distension.      Palpations: Abdomen is soft.      Tenderness: There is no abdominal tenderness.   Musculoskeletal:      Right lower leg: No edema.      Left lower leg: No edema.   Skin:     General: Skin is dry.      Capillary Refill: Capillary refill takes less than 2 seconds.   Neurological:      Mental Status: He is alert and oriented to person, place, and time.       Relevant Results    Results for orders placed or performed during the hospital encounter of 11/05/24 (from the past 24 hours)  Basic metabolic panel  Result Value Ref Range   Glucose 125 (H) 74 - 99 mg/dL   Sodium 149 (H) 136 - 145 mmol/L   Potassium 4.6 3.5 - 5.3 mmol/L   Chloride 124 (H) 98 - 107 mmol/L   Bicarbonate 16 (L) 21 - 32 mmol/L   Anion Gap 14 10 - 20 mmol/L   Urea Nitrogen 81 (H) 6 - 23 mg/dL   Creatinine 4.70 (H) 0.50 - 1.30 mg/dL   eGFR 12 (L) >60 mL/min/1.73m*2   Calcium 9.2 8.6 - 10.3 mg/dL  Basic metabolic panel  Result Value Ref Range   Glucose 154 (H) 74 - 99 mg/dL   Sodium 149 (H) 136 - 145 mmol/L   Potassium 5.3 3.5 - 5.3 mmol/L   Chloride 126 (H) 98 - 107 mmol/L   Bicarbonate 15 (L) 21 - 32 mmol/L   Anion Gap 13 10 - 20 mmol/L   Urea Nitrogen 78 (H) 6 - 23 mg/dL   Creatinine 4.59 (H) 0.50 - 1.30 mg/dL   eGFR 12 (L) >60 mL/min/1.73m*2   Calcium 9.2 8.6 - 10.3 mg/dL  CBC and Auto Differential  Result Value Ref Range   WBC 7.3 4.4 - 11.3 x10*3/uL   nRBC 0.0 0.0 - 0.0 /100 WBCs   RBC 3.16 (L) 4.50 - 5.90 x10*6/uL   Hemoglobin 10.3 (L) 13.5 - 17.5 g/dL   Hematocrit 33.2 (L) 41.0 - 52.0 %    (H) 80 - 100 fL   MCH 32.6 26.0 - 34.0 pg   MCHC 31.0 (L) 32.0 - 36.0 g/dL   RDW 19.2 (H) 11.5 - 14.5 %   Platelets 94 (L) 150 - 450 x10*3/uL   Neutrophils % 94.1 40.0 - 80.0 %   Immature Granulocytes %, Automated 0.7 0.0 - 0.9 %   Lymphocytes % 4.7 13.0 - 44.0 %   Monocytes % 0.4 2.0 - 10.0  %   Eosinophils % 0.0 0.0 - 6.0 %   Basophils % 0.1 0.0 - 2.0 %   Neutrophils Absolute 6.83 (H) 1.60 - 5.50 x10*3/uL   Immature Granulocytes Absolute, Automated 0.05 0.00 - 0.50 x10*3/uL   Lymphocytes Absolute 0.34 (L) 0.80 - 3.00 x10*3/uL   Monocytes Absolute 0.03 (L) 0.05 - 0.80 x10*3/uL   Eosinophils Absolute 0.00 0.00 - 0.40 x10*3/uL   Basophils Absolute 0.01 0.00 - 0.10 x10*3/uL  Blood Culture   Specimen: Peripheral Venipuncture; Blood culture  Result Value Ref Range   Blood Culture Loaded on Instrument - Culture in progress   Lactate  Result Value Ref Range   Lactate 1.6 0.4 - 2.0 mmol/L    Assessment/Plan      Antonio Willis 79 years old male patient past medical history of hyperlipidemia, GERD, BPH, MS, was brought to [facility] emergency department on 11/5/2024 for shortness of breath, and productive cough. He was diagnosed with pneumonia, pulmonary edema, and respiratory failure, interstitial lung disease.      Right lower lobe pneumonia  -Chest XR shows Interstitial changes throughout both lungs right worse than left favoring chronic interstitial disease.   - Continue azithromycin PO daily, and ceftriaxone IV, Duo-neb treatments  - Methylprednisolone IV 40mg daily   - IV lasix 80 mg x1 dose  - keep Supplemental oxygen Spo2 >92%  - Currently on 5L    HLD  - Continue home medications including Rosuvastatin     BPH  - Continue home medications including Tamsulosin    DVT/GI prophylaxis  - subcutaneous Heparin/ SCD/ Pepcid     Discharge planing  - Discharge home when clinically stable  - Labs/Testing reviewed  - Interdisciplinary team rounding completed with hospitalist, nurse, TCC  - NP discussed plan and lab/testing results with Dr. Saumya Sneed, APRN-CNP

## 2024-11-07 NOTE — PROGRESS NOTES
"Antonio Willis is a 79 y.o. male on day 2 of admission presenting with Pneumonia of right upper lobe due to infectious organism.    Subjective   Patient seen and examined.  He was hypoxic this morning and dropped his saturation to 88% on 3 L nasal cannula therefore it has been increased to 5 L oxygen via nasal cannula and saturation is, above 91% now.  His blood pressures are slightly better than other day but is more short of breath.  He has no peripheral edema, denies any chest pain.  Has had no fever or chills.       Objective     Physical Exam  GENERAL:  Alert, moderate distress, cooperative  SKIN:  Skin color, texture, turgor normal. No rashes or lesions.  OROPHARYNX:  Lips, mucosa, and tongue are normal.Teeth and gums, normal. Oropharynx normal.  NECK:  No jugulovenous distention, No carotid bruits, Carotid pulse normal contour, Supple  LUNGS: Generalized diminished air entry with bibasilar crackles posteriorly with intermittent wheezing, no pleural rub  CARDIAC: Rate and rhythm is regular, normal S1 and S2; no rubs,  or gallops, 2/6 systolic ejection murmur left sternal border and permanent pacemaker left upper chest.  ABDOMEN:  Abdomen soft, non-tender, BS normal, No masses or organomegaly  EXTREMETIES:  Extremities normal, no deformities, edema, clubbing or skin discoloration. Good capillary refill., No ulcers  NEURO:  Alert, oriented X 3, Gait not examined. Non-focal. Reflexes normal and symmetric. Sensation grossly intact., Cranial nerves II-XII intact  PULSES:  2+ radial, 2+ carotid    Last Recorded Vitals  Blood pressure (!) 115/49, pulse 75, temperature 36.2 °C (97.2 °F), temperature source Temporal, resp. rate 18, height 1.651 m (5' 5\"), weight 76.7 kg (169 lb), SpO2 93%.  Intake/Output last 3 Shifts:  I/O last 3 completed shifts:  In: 345 (4.5 mL/kg) [P.O.:345]  Out: 751 (9.8 mL/kg) [Urine:750 (0.3 mL/kg/hr); Emesis/NG output:1]  Weight: 76.7 kg     Relevant Results  Scheduled " medications  azithromycin, 500 mg, oral, q24h  cefTRIAXone, 2 g, intravenous, q24h  cholecalciferol, 1,000 Units, oral, Daily  famotidine, 10 mg, oral, q48h  heparin (porcine), 5,000 Units, subcutaneous, q8h PAULETTE  ipratropium-albuteroL, 3 mL, nebulization, TID  [START ON 11/8/2024] methylPREDNISolone sodium succinate (PF), 40 mg, intravenous, Daily  polyethylene glycol, 17 g, oral, Daily  rosuvastatin, 10 mg, oral, Nightly  sodium bicarbonate, 650 mg, oral, BID  tamsulosin, 0.4 mg, oral, Nightly      Continuous medications  oxygen, 2 L/min      PRN medications  PRN medications: acetaminophen, ipratropium-albuteroL, ondansetron **OR** ondansetron      Results for orders placed or performed during the hospital encounter of 11/05/24 (from the past 96 hours)   Sars-CoV-2 PCR   Result Value Ref Range    Coronavirus 2019, PCR Not Detected Not Detected   Influenza A, and B PCR   Result Value Ref Range    Flu A Result Not Detected Not Detected    Flu B Result Not Detected Not Detected   CBC and Auto Differential   Result Value Ref Range    WBC 8.8 4.4 - 11.3 x10*3/uL    nRBC 0.0 0.0 - 0.0 /100 WBCs    RBC 3.62 (L) 4.50 - 5.90 x10*6/uL    Hemoglobin 11.8 (L) 13.5 - 17.5 g/dL    Hematocrit 37.8 (L) 41.0 - 52.0 %     (H) 80 - 100 fL    MCH 32.6 26.0 - 34.0 pg    MCHC 31.2 (L) 32.0 - 36.0 g/dL    RDW 18.9 (H) 11.5 - 14.5 %    Platelets 117 (L) 150 - 450 x10*3/uL    Neutrophils % 77.4 40.0 - 80.0 %    Immature Granulocytes %, Automated 0.7 0.0 - 0.9 %    Lymphocytes % 11.7 13.0 - 44.0 %    Monocytes % 6.2 2.0 - 10.0 %    Eosinophils % 3.7 0.0 - 6.0 %    Basophils % 0.3 0.0 - 2.0 %    Neutrophils Absolute 6.78 (H) 1.60 - 5.50 x10*3/uL    Immature Granulocytes Absolute, Automated 0.06 0.00 - 0.50 x10*3/uL    Lymphocytes Absolute 1.02 0.80 - 3.00 x10*3/uL    Monocytes Absolute 0.54 0.05 - 0.80 x10*3/uL    Eosinophils Absolute 0.32 0.00 - 0.40 x10*3/uL    Basophils Absolute 0.03 0.00 - 0.10 x10*3/uL   Comprehensive metabolic  panel   Result Value Ref Range    Glucose 56 (L) 74 - 99 mg/dL    Sodium 148 (H) 136 - 145 mmol/L    Potassium 5.6 (H) 3.5 - 5.3 mmol/L    Chloride 122 (H) 98 - 107 mmol/L    Bicarbonate 19 (L) 21 - 32 mmol/L    Anion Gap 13 10 - 20 mmol/L    Urea Nitrogen 87 (H) 6 - 23 mg/dL    Creatinine 4.98 (H) 0.50 - 1.30 mg/dL    eGFR 11 (L) >60 mL/min/1.73m*2    Calcium 9.9 8.6 - 10.3 mg/dL    Albumin 3.7 3.4 - 5.0 g/dL    Alkaline Phosphatase 293 (H) 33 - 136 U/L    Total Protein 7.6 6.4 - 8.2 g/dL     (H) 9 - 39 U/L    Bilirubin, Total 0.6 0.0 - 1.2 mg/dL     (H) 10 - 52 U/L   Troponin I, High Sensitivity   Result Value Ref Range    Troponin I, High Sensitivity 17 0 - 20 ng/L   B-Type Natriuretic Peptide   Result Value Ref Range     (H) 0 - 99 pg/mL   ECG 12 lead   Result Value Ref Range    Ventricular Rate 64 BPM    Atrial Rate 64 BPM    MO Interval 212 ms    QRS Duration 160 ms    QT Interval 442 ms    QTC Calculation(Bazett) 455 ms    P Axis 48 degrees    R Axis 253 degrees    T Axis 46 degrees    QRS Count 11 beats    Q Onset 184 ms    P Onset 86 ms    P Offset 135 ms    T Offset 405 ms    QTC Fredericia 451 ms   Basic metabolic panel   Result Value Ref Range    Glucose 137 (H) 74 - 99 mg/dL    Sodium 150 (H) 136 - 145 mmol/L    Potassium 4.3 3.5 - 5.3 mmol/L    Chloride 126 (H) 98 - 107 mmol/L    Bicarbonate 17 (L) 21 - 32 mmol/L    Anion Gap 11 10 - 20 mmol/L    Urea Nitrogen 90 (H) 6 - 23 mg/dL    Creatinine 4.72 (H) 0.50 - 1.30 mg/dL    eGFR 12 (L) >60 mL/min/1.73m*2    Calcium 9.3 8.6 - 10.3 mg/dL   Lavender Top   Result Value Ref Range    Extra Tube Hold for add-ons.    Basic metabolic panel   Result Value Ref Range    Glucose 125 (H) 74 - 99 mg/dL    Sodium 149 (H) 136 - 145 mmol/L    Potassium 4.6 3.5 - 5.3 mmol/L    Chloride 124 (H) 98 - 107 mmol/L    Bicarbonate 16 (L) 21 - 32 mmol/L    Anion Gap 14 10 - 20 mmol/L    Urea Nitrogen 81 (H) 6 - 23 mg/dL    Creatinine 4.70 (H) 0.50 - 1.30  mg/dL    eGFR 12 (L) >60 mL/min/1.73m*2    Calcium 9.2 8.6 - 10.3 mg/dL   Basic metabolic panel   Result Value Ref Range    Glucose 154 (H) 74 - 99 mg/dL    Sodium 149 (H) 136 - 145 mmol/L    Potassium 5.3 3.5 - 5.3 mmol/L    Chloride 126 (H) 98 - 107 mmol/L    Bicarbonate 15 (L) 21 - 32 mmol/L    Anion Gap 13 10 - 20 mmol/L    Urea Nitrogen 78 (H) 6 - 23 mg/dL    Creatinine 4.59 (H) 0.50 - 1.30 mg/dL    eGFR 12 (L) >60 mL/min/1.73m*2    Calcium 9.2 8.6 - 10.3 mg/dL   CBC and Auto Differential   Result Value Ref Range    WBC 7.3 4.4 - 11.3 x10*3/uL    nRBC 0.0 0.0 - 0.0 /100 WBCs    RBC 3.16 (L) 4.50 - 5.90 x10*6/uL    Hemoglobin 10.3 (L) 13.5 - 17.5 g/dL    Hematocrit 33.2 (L) 41.0 - 52.0 %     (H) 80 - 100 fL    MCH 32.6 26.0 - 34.0 pg    MCHC 31.0 (L) 32.0 - 36.0 g/dL    RDW 19.2 (H) 11.5 - 14.5 %    Platelets 94 (L) 150 - 450 x10*3/uL    Neutrophils % 94.1 40.0 - 80.0 %    Immature Granulocytes %, Automated 0.7 0.0 - 0.9 %    Lymphocytes % 4.7 13.0 - 44.0 %    Monocytes % 0.4 2.0 - 10.0 %    Eosinophils % 0.0 0.0 - 6.0 %    Basophils % 0.1 0.0 - 2.0 %    Neutrophils Absolute 6.83 (H) 1.60 - 5.50 x10*3/uL    Immature Granulocytes Absolute, Automated 0.05 0.00 - 0.50 x10*3/uL    Lymphocytes Absolute 0.34 (L) 0.80 - 3.00 x10*3/uL    Monocytes Absolute 0.03 (L) 0.05 - 0.80 x10*3/uL    Eosinophils Absolute 0.00 0.00 - 0.40 x10*3/uL    Basophils Absolute 0.01 0.00 - 0.10 x10*3/uL   Lactate   Result Value Ref Range    Lactate 1.6 0.4 - 2.0 mmol/L                 Assessment/Plan   Assessment & Plan  Pneumonia of right upper lobe due to infectious organism  79-year-old gentleman with history of multiple sclerosis, without significant neurological deficit, history of previous permanent pacemaker placement, hypertension, recent COVID infection about 2 months ago, interstitial lung disease, chronic smoking which he quit about a month ago, history of prostate carcinoma in the past s/p radiation treatment and history  of BPH and nephrolithiasis, patient is on home oxygen for interstitial lung disease, presents with increasing shortness of breath and cough.  He normally uses 2 to 3 L of nasal cannula oxygen at home.  His chest x-ray is consistent with interstitial lung disease, although he is admitted with a diagnosis of pneumonia but chest x-ray revealed no evidence of consolidation.  He has had a high-resolution CT in September 2024 which is consistent with interstitial lung disease and significant emphysema.    1.  Acute exacerbation of chronic lung disease with wheezing and shortness of breath and questionable pneumonic infiltrate and history of interstitial lung disease.  White cell count is normal, chest x-ray revealed interstitial lung disease without definite consolidation.  Patient started on IV ceftriaxone and azithromycin  He does have a history of smoking and there is some element of COPD exacerbation therefore I have added Solu-Medrol 40 mg every 8 hours and I have also requested a pulmonary consultation who have seen the patient and they concur with the treatment.  To continue with oxygen to maintain his saturation above 92%.  Oxygen increased to 5 L nasal cannula this morning because of significant hypoxia.  Repeat chest x-ray does reveal some pulmonary venous congestion particular on the right side therefore he received a dose of IV 80 mg Lasix one-time.  Appreciate cardiology and pulmonary input.    2.  Low normal blood pressures, asymptomatic, serum troponin is negative and EKG reveals no acute ischemic changes, will monitor closely.  No evidence of overt heart failure.  Most recent echocardiogram was in October 2024 which revealed normal left ventricular systolic function elevated right ventricular systolic pressure and diastolic dysfunction.  Will monitor blood pressure closely  Blood pressures have improved and they are about 100 mmHg    3.  History of COVID infection few months ago currently has no symptoms  related to COVID.    4.  Chronic kidney disease stage IV with a serum potassium of 5.6 and serum creatinine of 4.6 on admission.  Hyperkalemia has been treated and repeat potassium is 4.6 today.  Patient follows with Dr. Higgins as an outpatient.  No change in renal function remains elevated BUN and creatinine.    5.  Previous history of prostate carcinoma s/p radiation and history of BPH and nephrolithiasis, patient had taken a second urological opinion at [facility] and then he came back to  and has seen Dr. Buckner who had advised him to get CT of the abdomen and pelvis which I had ordered this afternoon.  Incidentally he was supposed to get CT of abdomen pelvis this afternoon as an outpatient.    6.  Hyperlipidemia continue with current medications    Reviewed all labs and imaging studies and discussed the plan of treatment with patient in detail.  Acute pulmonary edema    Acute on chronic hypoxic respiratory failure (Multi)    Interstitial lung disease (Multi)           I spent 35 minutes in the professional and overall care of this patient.      Rome Kerns MD

## 2024-11-08 PROBLEM — G93.41 ACUTE METABOLIC ENCEPHALOPATHY: Status: ACTIVE | Noted: 2024-11-08

## 2024-11-08 LAB
ALBUMIN SERPL BCP-MCNC: 3.5 G/DL (ref 3.4–5)
ALP SERPL-CCNC: 228 U/L (ref 33–136)
ALT SERPL W P-5'-P-CCNC: 97 U/L (ref 10–52)
ANION GAP SERPL CALC-SCNC: 15 MMOL/L (ref 10–20)
AST SERPL W P-5'-P-CCNC: 29 U/L (ref 9–39)
BASOPHILS # BLD AUTO: 0.02 X10*3/UL (ref 0–0.1)
BASOPHILS NFR BLD AUTO: 0.1 %
BILIRUB SERPL-MCNC: 0.5 MG/DL (ref 0–1.2)
BUN SERPL-MCNC: 84 MG/DL (ref 6–23)
CALCIUM SERPL-MCNC: 9.2 MG/DL (ref 8.6–10.3)
CHLORIDE SERPL-SCNC: 123 MMOL/L (ref 98–107)
CO2 SERPL-SCNC: 18 MMOL/L (ref 21–32)
CREAT SERPL-MCNC: 4.72 MG/DL (ref 0.5–1.3)
EGFRCR SERPLBLD CKD-EPI 2021: 12 ML/MIN/1.73M*2
EOSINOPHIL # BLD AUTO: 0.01 X10*3/UL (ref 0–0.4)
EOSINOPHIL NFR BLD AUTO: 0.1 %
ERYTHROCYTE [DISTWIDTH] IN BLOOD BY AUTOMATED COUNT: 19.4 % (ref 11.5–14.5)
GLUCOSE SERPL-MCNC: 111 MG/DL (ref 74–99)
HCT VFR BLD AUTO: 33.3 % (ref 41–52)
HGB BLD-MCNC: 10.4 G/DL (ref 13.5–17.5)
IMM GRANULOCYTES # BLD AUTO: 0.24 X10*3/UL (ref 0–0.5)
IMM GRANULOCYTES NFR BLD AUTO: 1.6 % (ref 0–0.9)
LYMPHOCYTES # BLD AUTO: 0.72 X10*3/UL (ref 0.8–3)
LYMPHOCYTES NFR BLD AUTO: 4.7 %
MCH RBC QN AUTO: 32.7 PG (ref 26–34)
MCHC RBC AUTO-ENTMCNC: 31.2 G/DL (ref 32–36)
MCV RBC AUTO: 105 FL (ref 80–100)
MONOCYTES # BLD AUTO: 0.89 X10*3/UL (ref 0.05–0.8)
MONOCYTES NFR BLD AUTO: 5.9 %
NEUTROPHILS # BLD AUTO: 13.3 X10*3/UL (ref 1.6–5.5)
NEUTROPHILS NFR BLD AUTO: 87.6 %
NRBC BLD-RTO: 0 /100 WBCS (ref 0–0)
PLATELET # BLD AUTO: 104 X10*3/UL (ref 150–450)
POTASSIUM SERPL-SCNC: 4.9 MMOL/L (ref 3.5–5.3)
PROT SERPL-MCNC: 6.6 G/DL (ref 6.4–8.2)
RBC # BLD AUTO: 3.18 X10*6/UL (ref 4.5–5.9)
SODIUM SERPL-SCNC: 151 MMOL/L (ref 136–145)
WBC # BLD AUTO: 15.2 X10*3/UL (ref 4.4–11.3)

## 2024-11-08 PROCEDURE — 2500000004 HC RX 250 GENERAL PHARMACY W/ HCPCS (ALT 636 FOR OP/ED)

## 2024-11-08 PROCEDURE — 2500000002 HC RX 250 W HCPCS SELF ADMINISTERED DRUGS (ALT 637 FOR MEDICARE OP, ALT 636 FOR OP/ED): Performed by: INTERNAL MEDICINE

## 2024-11-08 PROCEDURE — 97165 OT EVAL LOW COMPLEX 30 MIN: CPT | Mod: GO

## 2024-11-08 PROCEDURE — 2500000005 HC RX 250 GENERAL PHARMACY W/O HCPCS

## 2024-11-08 PROCEDURE — 94640 AIRWAY INHALATION TREATMENT: CPT

## 2024-11-08 PROCEDURE — 80053 COMPREHEN METABOLIC PANEL: CPT | Performed by: INTERNAL MEDICINE

## 2024-11-08 PROCEDURE — 99223 1ST HOSP IP/OBS HIGH 75: CPT | Performed by: INTERNAL MEDICINE

## 2024-11-08 PROCEDURE — 2500000001 HC RX 250 WO HCPCS SELF ADMINISTERED DRUGS (ALT 637 FOR MEDICARE OP): Performed by: INTERNAL MEDICINE

## 2024-11-08 PROCEDURE — 2500000004 HC RX 250 GENERAL PHARMACY W/ HCPCS (ALT 636 FOR OP/ED): Performed by: INTERNAL MEDICINE

## 2024-11-08 PROCEDURE — 36415 COLL VENOUS BLD VENIPUNCTURE: CPT | Performed by: INTERNAL MEDICINE

## 2024-11-08 PROCEDURE — 99233 SBSQ HOSP IP/OBS HIGH 50: CPT | Performed by: INTERNAL MEDICINE

## 2024-11-08 PROCEDURE — 99222 1ST HOSP IP/OBS MODERATE 55: CPT | Performed by: INTERNAL MEDICINE

## 2024-11-08 PROCEDURE — 97535 SELF CARE MNGMENT TRAINING: CPT | Mod: GO

## 2024-11-08 PROCEDURE — 85025 COMPLETE CBC W/AUTO DIFF WBC: CPT | Performed by: INTERNAL MEDICINE

## 2024-11-08 PROCEDURE — 2500000004 HC RX 250 GENERAL PHARMACY W/ HCPCS (ALT 636 FOR OP/ED): Performed by: NURSE PRACTITIONER

## 2024-11-08 PROCEDURE — 1100000001 HC PRIVATE ROOM DAILY

## 2024-11-08 PROCEDURE — 99497 ADVNCD CARE PLAN 30 MIN: CPT | Performed by: INTERNAL MEDICINE

## 2024-11-08 PROCEDURE — 2500000005 HC RX 250 GENERAL PHARMACY W/O HCPCS: Performed by: INTERNAL MEDICINE

## 2024-11-08 PROCEDURE — 92610 EVALUATE SWALLOWING FUNCTION: CPT | Mod: GN

## 2024-11-08 RX ORDER — OLANZAPINE 10 MG/2ML
2.5 INJECTION, POWDER, FOR SOLUTION INTRAMUSCULAR ONCE AS NEEDED
Status: COMPLETED | OUTPATIENT
Start: 2024-11-08 | End: 2024-11-08

## 2024-11-08 RX ORDER — OLANZAPINE 10 MG/2ML
5 INJECTION, POWDER, FOR SOLUTION INTRAMUSCULAR EVERY 30 MIN PRN
Status: COMPLETED | OUTPATIENT
Start: 2024-11-08 | End: 2024-11-12

## 2024-11-08 RX ORDER — DEXTROSE MONOHYDRATE 50 MG/ML
50 INJECTION, SOLUTION INTRAVENOUS CONTINUOUS
Status: ACTIVE | OUTPATIENT
Start: 2024-11-08 | End: 2024-11-09

## 2024-11-08 RX ORDER — DEXTROSE MONOHYDRATE 50 MG/ML
60 INJECTION, SOLUTION INTRAVENOUS CONTINUOUS
Status: DISCONTINUED | OUTPATIENT
Start: 2024-11-08 | End: 2024-11-08

## 2024-11-08 RX ORDER — TALC
5 POWDER (GRAM) TOPICAL EVERY EVENING
Status: DISCONTINUED | OUTPATIENT
Start: 2024-11-08 | End: 2024-11-13 | Stop reason: HOSPADM

## 2024-11-08 RX ORDER — OLANZAPINE 10 MG/2ML
5 INJECTION, POWDER, FOR SOLUTION INTRAMUSCULAR ONCE
Status: COMPLETED | OUTPATIENT
Start: 2024-11-08 | End: 2024-11-08

## 2024-11-08 RX ORDER — OLANZAPINE 10 MG/2ML
INJECTION, POWDER, FOR SOLUTION INTRAMUSCULAR
Status: COMPLETED
Start: 2024-11-08 | End: 2024-11-08

## 2024-11-08 RX ORDER — OLANZAPINE 5 MG/1
2.5 TABLET ORAL NIGHTLY
Status: DISCONTINUED | OUTPATIENT
Start: 2024-11-08 | End: 2024-11-13 | Stop reason: HOSPADM

## 2024-11-08 RX ADMIN — IPRATROPIUM BROMIDE AND ALBUTEROL SULFATE 3 ML: 2.5; .5 SOLUTION RESPIRATORY (INHALATION) at 14:40

## 2024-11-08 RX ADMIN — IPRATROPIUM BROMIDE AND ALBUTEROL SULFATE 3 ML: 2.5; .5 SOLUTION RESPIRATORY (INHALATION) at 19:34

## 2024-11-08 RX ADMIN — IPRATROPIUM BROMIDE AND ALBUTEROL SULFATE 3 ML: 2.5; .5 SOLUTION RESPIRATORY (INHALATION) at 07:17

## 2024-11-08 RX ADMIN — METHYLPREDNISOLONE SODIUM SUCCINATE 40 MG: 40 INJECTION, POWDER, FOR SOLUTION INTRAMUSCULAR; INTRAVENOUS at 09:44

## 2024-11-08 RX ADMIN — OLANZAPINE 2.5 MG: 10 INJECTION, POWDER, FOR SOLUTION INTRAMUSCULAR at 02:42

## 2024-11-08 RX ADMIN — SODIUM BICARBONATE 650 MG: 650 TABLET ORAL at 20:23

## 2024-11-08 RX ADMIN — OLANZAPINE 5 MG: 10 INJECTION, POWDER, FOR SOLUTION INTRAMUSCULAR at 06:20

## 2024-11-08 RX ADMIN — TAMSULOSIN HYDROCHLORIDE 0.4 MG: 0.4 CAPSULE ORAL at 20:23

## 2024-11-08 RX ADMIN — DEXTROSE MONOHYDRATE 60 ML/HR: 50 INJECTION, SOLUTION INTRAVENOUS at 10:41

## 2024-11-08 RX ADMIN — HEPARIN SODIUM 5000 UNITS: 5000 INJECTION, SOLUTION INTRAVENOUS; SUBCUTANEOUS at 15:47

## 2024-11-08 RX ADMIN — DEXTROSE MONOHYDRATE 50 ML/HR: 50 INJECTION, SOLUTION INTRAVENOUS at 12:32

## 2024-11-08 RX ADMIN — Medication 1000 UNITS: at 09:27

## 2024-11-08 RX ADMIN — PIPERACILLIN SODIUM AND TAZOBACTAM SODIUM 2.25 G: 2; .25 INJECTION, SOLUTION INTRAVENOUS at 20:23

## 2024-11-08 RX ADMIN — ROSUVASTATIN CALCIUM 10 MG: 10 TABLET, FILM COATED ORAL at 20:23

## 2024-11-08 RX ADMIN — PIPERACILLIN SODIUM AND TAZOBACTAM SODIUM 2.25 G: 2; .25 INJECTION, SOLUTION INTRAVENOUS at 15:47

## 2024-11-08 RX ADMIN — OLANZAPINE 2.5 MG: 5 TABLET, FILM COATED ORAL at 20:23

## 2024-11-08 RX ADMIN — PIPERACILLIN SODIUM AND TAZOBACTAM SODIUM 2.25 G: 2; .25 INJECTION, SOLUTION INTRAVENOUS at 09:27

## 2024-11-08 RX ADMIN — SODIUM BICARBONATE 650 MG: 650 TABLET ORAL at 09:27

## 2024-11-08 RX ADMIN — Medication 5 L/MIN: at 09:46

## 2024-11-08 RX ADMIN — Medication 4.5 MG: at 20:22

## 2024-11-08 RX ADMIN — HEPARIN SODIUM 5000 UNITS: 5000 INJECTION, SOLUTION INTRAVENOUS; SUBCUTANEOUS at 22:00

## 2024-11-08 ASSESSMENT — COGNITIVE AND FUNCTIONAL STATUS - GENERAL
CLIMB 3 TO 5 STEPS WITH RAILING: A LITTLE
PERSONAL GROOMING: A LITTLE
DRESSING REGULAR LOWER BODY CLOTHING: A LITTLE
TOILETING: A LITTLE
TURNING FROM BACK TO SIDE WHILE IN FLAT BAD: A LOT
DRESSING REGULAR LOWER BODY CLOTHING: A LOT
TOILETING: A LOT
HELP NEEDED FOR BATHING: A LITTLE
TOILETING: A LITTLE
DAILY ACTIVITIY SCORE: 16
DRESSING REGULAR LOWER BODY CLOTHING: A LITTLE
MOBILITY SCORE: 14
PERSONAL GROOMING: A LOT
MOVING FROM LYING ON BACK TO SITTING ON SIDE OF FLAT BED WITH BEDRAILS: A LOT
DAILY ACTIVITIY SCORE: 15
HELP NEEDED FOR BATHING: A LITTLE
WALKING IN HOSPITAL ROOM: A LITTLE
HELP NEEDED FOR BATHING: A LITTLE
EATING MEALS: A LOT
DRESSING REGULAR UPPER BODY CLOTHING: A LITTLE
CLIMB 3 TO 5 STEPS WITH RAILING: A LITTLE
DAILY ACTIVITIY SCORE: 16
MOVING TO AND FROM BED TO CHAIR: A LOT
DRESSING REGULAR UPPER BODY CLOTHING: A LOT
STANDING UP FROM CHAIR USING ARMS: A LOT
WALKING IN HOSPITAL ROOM: A LITTLE
TURNING FROM BACK TO SIDE WHILE IN FLAT BAD: A LOT
STANDING UP FROM CHAIR USING ARMS: A LOT
DRESSING REGULAR UPPER BODY CLOTHING: A LITTLE
EATING MEALS: A LOT
MOBILITY SCORE: 14
MOVING FROM LYING ON BACK TO SITTING ON SIDE OF FLAT BED WITH BEDRAILS: A LOT
EATING MEALS: A LITTLE
PERSONAL GROOMING: A LOT
MOVING TO AND FROM BED TO CHAIR: A LOT

## 2024-11-08 ASSESSMENT — PAIN SCALES - GENERAL
PAINLEVEL_OUTOF10: 0 - NO PAIN

## 2024-11-08 ASSESSMENT — PAIN - FUNCTIONAL ASSESSMENT
PAIN_FUNCTIONAL_ASSESSMENT: 0-10
PAIN_FUNCTIONAL_ASSESSMENT: 0-10

## 2024-11-08 ASSESSMENT — ACTIVITIES OF DAILY LIVING (ADL)
ADL_ASSISTANCE: NEEDS ASSISTANCE
BATHING_ASSISTANCE: MODERATE
HOME_MANAGEMENT_TIME_ENTRY: 25

## 2024-11-08 NOTE — CONSULTS
Inpatient consult to Palliative Care  Consult performed by: Marek Chao MD  Consult ordered by: Jelani Sneed APRN-CNP      Reason For Consult  Reason for Consult: symptom management and patient/family support     History Of Present Illness  Antonio Willis is a 79 y.o. male with past medical history of stable multiple sclerosis, dual chamber pacemaker for bifasicular block, current smoker, interstitial lung disease w/ CRF on 2-3L home O2, CKD 4,  history of GIB, history of prostate CA s/p radiation, presenting to the hospital for worsening dyspnea and cough. He is being treated for pneumonia with ceftriaxone and azithromycin  and COPD exacerbation with steroids and nebulizer therapy. CXR without florid evidence of PNA but perhaps mild volume overload. Viral and bacterial PNA work-up pending or negative thus far. He is back to his home dose of [place] oxygen. Labs significant for hypokalemia and    Palliative was consulted for symptom management.      Conversation with wife Emma Willis:  Goals are to keep him at home as long as possible. They have private duty and LTC insurance that will begin soon. He is DNR/DNI per wife and she has documentation of this at home. In the meantime she agrees to have the code status changed here.   We discussed tube feeding and agreed that this will likely cause him distress so pleasure feedings would be a better option.    They want his PPM checked because they have been told its 6 months from depletion a few times already.     We discussed hospice and she is accepting that he may soon be ready for this but I will not place a referral just yet. She was somewhat surprised to hear hospice mentioned; she thinks 6 months is when he will be ready.     The agreed upon plan is to continue treating him to allow for recovery and then determine next steps. She is accepting of SNF.         Symptoms (0 - 10, Best to Worst)  [place] Symptom Assessment System  0-10 (Numeric) Pain Score: 0 - No  pain    BM in last 48 hours? yes    Personal/Social History  Lives at home with wife. She has private pay HHA but is reaching limits of what care she can provide. They have LTC insurance that will begin paying out at the end of the month.  They have two children; a son in [facility] and a dtr in [place]. [facility]      He reports that he quit smoking about 22 months ago. His smoking use included cigars and cigarettes. He started smoking about 68 years ago. He has a 67 pack-year smoking history. He has been exposed to tobacco smoke. He does not have any smokeless tobacco history on file. He reports current alcohol use of about 1.0 standard drink of alcohol per week. He reports that he does not use drugs.    Functional Status        Caregiving/Caregiver Support  Does the patient require assistance in some or all components of his care, including coordination of medical care? Yes  If Yes, which person serves that role?  spouse   Caregiver emotional or practical needs:      Past Medical History  He has a past medical history of Cardiac pacemaker, Chronic anemia, CKD (chronic kidney disease), stage IV (Multi), Granuloma annulare, Hemiplegia, unspecified affecting right dominant side (Multi), History of GI bleed, History of prostate cancer, Hydronephrosis concurrent with and due to calculi of kidney and ureter, Hyperkalemia, Hyperlipidemia, unspecified, Multiple sclerosis (Multi), Pyuria, Recurrent kidney stones, and Tremor, unspecified.    Surgical History  He has a past surgical history that includes Hernia repair (Right, 1993); US guided abscess drain (11/24/2021); IR placement of nephrostomy catheter (11/24/2021); Prostate biopsy (02/13/2008); ORIF ankle fracture (Right, 2003); Cystoscopy w/ laser lithotripsy (Left); Extracorporeal shock wave lithotripsy (Left); Bladder stone removal (11/09/2007); and pacemaker placement (10/17/2011).     Family History  Family History   Problem Relation  "Name Age of Onset    Leukemia Mother      Heart attack Father      Diabetes Father      Diabetes Sister      Tremor Sister      Leukemia Brother      Parkinsonism Father's Sister       Allergies  Patient has no known allergies.    Review of Systems     Physical Exam  Constitutional:       Comments: Confused, picking at blanket. Easily redirectable but trying to get out of bed though too weak to do so effectively.   HENT:      Head: Normocephalic and atraumatic.   Eyes:      Extraocular Movements: Extraocular movements intact.      Pupils: Pupils are equal, round, and reactive to light.   Cardiovascular:      Rate and Rhythm: Normal rate and regular rhythm.      Heart sounds: Normal heart sounds.   Pulmonary:      Effort: Pulmonary effort is normal.      Breath sounds: Normal breath sounds.   Abdominal:      General: Abdomen is flat. Bowel sounds are normal. There is no distension.      Palpations: Abdomen is soft.      Tenderness: There is no abdominal tenderness.   Musculoskeletal:         General: No swelling.      Cervical back: Normal range of motion and neck supple.   Skin:     General: Skin is warm and dry.   Neurological:      Mental Status: He is disoriented.      Comments: Confused, delirious. Visual hallucinations. Redirectable.          Last Recorded Vitals  Blood pressure 135/70, pulse 62, temperature 36.3 °C (97.3 °F), temperature source Temporal, resp. rate 18, height 1.651 m (5' 5\"), weight 76.7 kg (169 lb), SpO2 94%.    Relevant Results  Results for orders placed or performed during the hospital encounter of 11/05/24 (from the past 24 hours)   CBC and Auto Differential   Result Value Ref Range    WBC 15.2 (H) 4.4 - 11.3 x10*3/uL    nRBC 0.0 0.0 - 0.0 /100 WBCs    RBC 3.18 (L) 4.50 - 5.90 x10*6/uL    Hemoglobin 10.4 (L) 13.5 - 17.5 g/dL    Hematocrit 33.3 (L) 41.0 - 52.0 %     (H) 80 - 100 fL    MCH 32.7 26.0 - 34.0 pg    MCHC 31.2 (L) 32.0 - 36.0 g/dL    RDW 19.4 (H) 11.5 - 14.5 %    Platelets " 104 (L) 150 - 450 x10*3/uL    Neutrophils % 87.6 40.0 - 80.0 %    Immature Granulocytes %, Automated 1.6 (H) 0.0 - 0.9 %    Lymphocytes % 4.7 13.0 - 44.0 %    Monocytes % 5.9 2.0 - 10.0 %    Eosinophils % 0.1 0.0 - 6.0 %    Basophils % 0.1 0.0 - 2.0 %    Neutrophils Absolute 13.30 (H) 1.60 - 5.50 x10*3/uL    Immature Granulocytes Absolute, Automated 0.24 0.00 - 0.50 x10*3/uL    Lymphocytes Absolute 0.72 (L) 0.80 - 3.00 x10*3/uL    Monocytes Absolute 0.89 (H) 0.05 - 0.80 x10*3/uL    Eosinophils Absolute 0.01 0.00 - 0.40 x10*3/uL    Basophils Absolute 0.02 0.00 - 0.10 x10*3/uL   Comprehensive metabolic panel   Result Value Ref Range    Glucose 111 (H) 74 - 99 mg/dL    Sodium 151 (H) 136 - 145 mmol/L    Potassium 4.9 3.5 - 5.3 mmol/L    Chloride 123 (H) 98 - 107 mmol/L    Bicarbonate 18 (L) 21 - 32 mmol/L    Anion Gap 15 10 - 20 mmol/L    Urea Nitrogen 84 (H) 6 - 23 mg/dL    Creatinine 4.72 (H) 0.50 - 1.30 mg/dL    eGFR 12 (L) >60 mL/min/1.73m*2    Calcium 9.2 8.6 - 10.3 mg/dL    Albumin 3.5 3.4 - 5.0 g/dL    Alkaline Phosphatase 228 (H) 33 - 136 U/L    Total Protein 6.6 6.4 - 8.2 g/dL    AST 29 9 - 39 U/L    Bilirubin, Total 0.5 0.0 - 1.2 mg/dL    ALT 97 (H) 10 - 52 U/L          Assessment/Plan   IMP:  78 yo M with history of stable MS, CKD4, history of CVA/prostate CA s/p radiation, BPH, PPM in situ, interstitial lung disease in current smoker presenting with acute on chronic respiratory failure. Currently being managed for copd exacerbation, bacterial PNA and acute pulmonary edema. Goals are optimizing patient for discharge, possibly to SNF and then return home as long as possible. Controlling delirium is priority. She is aware of his decline but does not fully grasp how frail he is.   Palliative is assisting with symptom management.       Plan:  - Continue current treatment plan  - Changed code status to DNR/DNI per discussion with wife  - If possible, please have PPM checked. No evidence of issues on heart  auscultation but this would allay wife's fears of it being an issue  -Wife wants him to have pleasure feedings. We discussed risk of aspiration and I will contact SLP for recommendations.   - Will try to arrange informational hospice referral next week if patient is still here and improved.   - Will schedule olanzapine 2.5mg nightly.   - Will continue to follow.     - Recommend Delirium/Dementia Precautions:       - Minimize use of benzos, opiates, anticholinergics, as these may worsen mental status       - Would use caution with narcotic pain medications       - Would still adequately controlling pain, as uncontrolled pain is also a risk factor for delirium       - Reinforce sleep hygiene; encourage patient to stay awake during the day       - Keep curtains/blinds open during the day and closed at night.       - Would recommend reorienting/redirecting patient as much as possible,        - Aim for consistent staffing, familiar objects, avoiding bright lights and loud noises, etc.       - Can consider melatonin at night before bed.        Provider estimate of survival: 6+ months Barring unexpected event. I would not be surprised with an event within six months.   Patient Prognostic Awareness: Yes - incongruent with provider estimation    Patient/proxy preference for information  Prefers full information    Goals of Care  DNR DNI per home orders    Is the patient hospice-eligible?   Yes  Was a discussion held re hospice services?   yes  Was a decision made re hospice services?  No, Deferring for now.    Other Palliative Support      I spent 50 minutes in the professional and overall care of this patient.  I spent 30 minutes in the professional and overall care of this patient related to ACP in addition to other time spent in assessment, chart review, and coordination of care        Marek Chao MD

## 2024-11-08 NOTE — CONSULTS
CONSULT: NEPHROLOGY SERVICE    REASON FOR CONSULT: CKD  Admit Date: 11/5/2024  8:13 PM       HPI: Patient is a 79 y.o. male admitted 11/5/2024 with 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 Hypotension, weakness, found to have worsening infiltrates more consistent with PNA, on zosyn, also had iv lasix  He denies worsening SOB, weak, sleepy  Consulted because of CKD    Past Medical History:   Diagnosis Date    Cardiac pacemaker     Implanted 10/17/2011 due to SSS    Chronic anemia     CKD (chronic kidney disease), stage IV (Multi)     Granuloma annulare     Hemiplegia, unspecified affecting right dominant side (Multi)     Right Hemiparesis from MS    History of GI bleed     UGI due to gastric and duodenal ulcer    History of prostate cancer     dx 02/13/2008; treated with hormone tx and external beam radiation    Hydronephrosis concurrent with and due to calculi of kidney and ureter     Recurrent episodes on the LEFT    Hyperkalemia     Due to CKD; followed by Dr [place] Higgins    Hyperlipidemia, unspecified     Multiple sclerosis (Multi)     Dx 2011    Pyuria     Chronic    Recurrent kidney stones     Left side    Tremor, unspecified      Allergies: Patient has no known allergies.     Past Surgical History:   Procedure Laterality Date    BLADDER STONE REMOVAL  11/09/2007    Cystolithalopaxy    CYSTOSCOPY W/ LASER LITHOTRIPSY Left     12/3/20, 5/6/21, 5/20/21, 8/26/21, 8/24/23    EXTRACORPOREAL SHOCK WAVE LITHOTRIPSY Left     10/01/20, 10/29/20    HERNIA REPAIR Right 1993    Right inguinal hernia repair    IR  NEPHROSTOMY PLACEMENT  11/24/2021    IR  NEPHROSTOMY PLACEMENT 11/24/2021 [facility]    ORIF ANKLE FRACTURE Right 2003    PACEMAKER PLACEMENT  10/17/2011    PROSTATE BIOPSY  02/13/2008    + cancer    US GUIDED ABSCESS DRAIN   11/24/2021    US GUIDED ABSCESS DRAIN 11/24/2021 [facility]       Family History   Problem Relation Name Age of Onset    Leukemia Mother      Heart attack Father      Diabetes Father      Diabetes Sister      Tremor Sister      Leukemia Brother      Parkinsonism Father's Sister         Social History  He reports that he quit smoking about 22 months ago. His smoking use included cigars and cigarettes. He started smoking about 68 years ago. He has a 67 pack-year smoking history. He has been exposed to tobacco smoke. He does not have any smokeless tobacco history on file. He reports current alcohol use of about 1.0 standard drink of alcohol per week. He reports that he does not use drugs.    Review of Systems  As above    CURRENT HOSP MEDS:    Current Facility-Administered Medications:     acetaminophen (Tylenol) tablet 650 mg, 650 mg, oral, q6h PRN, Rome Kerns MD    cholecalciferol (Vitamin D-3) tablet 1,000 Units, 1,000 Units, oral, Daily, Rome Kerns MD, 1,000 Units at 11/08/24 0927    dextrose 5% infusion, 50 mL/hr, intravenous, Continuous, Theo Davis MD    famotidine (Pepcid) tablet 10 mg, 10 mg, oral, q48h, Rome Kerns MD, 10 mg at 11/07/24 2021    heparin (porcine) injection 5,000 Units, 5,000 Units, subcutaneous, q8h PAULETTE, Rome Kerns MD, 5,000 Units at 11/07/24 1448    ipratropium-albuteroL (Duo-Neb) 0.5-2.5 mg/3 mL nebulizer solution 3 mL, 3 mL, nebulization, q2h PRN, Rome Kerns MD    ipratropium-albuteroL (Duo-Neb) 0.5-2.5 mg/3 mL nebulizer solution 3 mL, 3 mL, nebulization, TID, Rome Kerns MD, 3 mL at 11/08/24 0717    melatonin tablet 4.5 mg, 4.5 mg, oral, q PM, Marek Chao MD    [START ON 11/9/2024] methylPREDNISolone sod succinate (SOLU-Medrol) 40 mg/mL injection 20 mg, 20 mg, intravenous, Daily **FOLLOWED BY** [START ON 11/11/2024] methylPREDNISolone sod succinate (SOLU-Medrol) 40 mg/mL injection 10 mg, 10 mg, intravenous, Daily, Pallavi Mcnally DO     "OLANZapine (ZyPREXA) injection 5 mg, 5 mg, intramuscular, q30 min PRN, John Garcia MD    OLANZapine (ZyPREXA) tablet 2.5 mg, 2.5 mg, oral, Nightly, Marek Chao MD    ondansetron (Zofran) tablet 4 mg, 4 mg, oral, q8h PRN **OR** ondansetron (Zofran) injection 4 mg, 4 mg, intravenous, q8h PRN, Rome Kerns MD    oxygen (O2) therapy, 2 L/min, inhalation, Continuous, Jelani Sneed, APRN-CNP, Last Rate: 300,000 mL/hr at 11/08/24 0946, 5 L/min at 11/08/24 0946    piperacillin-tazobactam (Zosyn) 2.25 g in dextrose (iso) IV 50 mL, 2.25 g, intravenous, q6h, Chicho Avina MD, Stopped at 11/08/24 0937    polyethylene glycol (Glycolax, Miralax) packet 17 g, 17 g, oral, Daily, Rome Kerns MD, 17 g at 11/07/24 0901    rosuvastatin (Crestor) tablet 10 mg, 10 mg, oral, Nightly, Rome Kerns MD, 10 mg at 11/07/24 2012    sodium bicarbonate tablet 650 mg, 650 mg, oral, BID, Rome Kerns MD, 650 mg at 11/08/24 0927    tamsulosin (Flomax) 24 hr capsule 0.4 mg, 0.4 mg, oral, Nightly, Rome Kerns MD, 0.4 mg at 11/07/24 2012     PHYSICAL EXAM:  /75 (BP Location: Right arm, Patient Position: Lying)   Pulse 88   Temp 35.9 °C (96.6 °F) (Temporal)   Resp 18   Ht 1.651 m (5' 5\")   Wt 76.7 kg (169 lb)   SpO2 94%   BMI 28.12 kg/m²     Intake/Output Summary (Last 24 hours) at 11/8/2024 1225  Last data filed at 11/8/2024 0802  Gross per 24 hour   Intake --   Output 650 ml   Net -650 ml     Gen: sleepy, awake, NAD  Neck: No JVD  Cardiac: RRR  Resp: decrease BS  Abd: Soft, non tender, +BS, non distended   Ext: No edema   Neuro: moves 4 ext  Peripheral Pulses: weak peripheral pulses.  Skin: Skin color, texture, turgor normal, no suspicious rashes or lesions.    LABS:   Results for orders placed or performed during the hospital encounter of 11/05/24 (from the past 24 hours)   CBC and Auto Differential   Result Value Ref Range    WBC 15.2 (H) 4.4 - 11.3 x10*3/uL    nRBC 0.0 0.0 - 0.0 /100 WBCs "    RBC 3.18 (L) 4.50 - 5.90 x10*6/uL    Hemoglobin 10.4 (L) 13.5 - 17.5 g/dL    Hematocrit 33.3 (L) 41.0 - 52.0 %     (H) 80 - 100 fL    MCH 32.7 26.0 - 34.0 pg    MCHC 31.2 (L) 32.0 - 36.0 g/dL    RDW 19.4 (H) 11.5 - 14.5 %    Platelets 104 (L) 150 - 450 x10*3/uL    Neutrophils % 87.6 40.0 - 80.0 %    Immature Granulocytes %, Automated 1.6 (H) 0.0 - 0.9 %    Lymphocytes % 4.7 13.0 - 44.0 %    Monocytes % 5.9 2.0 - 10.0 %    Eosinophils % 0.1 0.0 - 6.0 %    Basophils % 0.1 0.0 - 2.0 %    Neutrophils Absolute 13.30 (H) 1.60 - 5.50 x10*3/uL    Immature Granulocytes Absolute, Automated 0.24 0.00 - 0.50 x10*3/uL    Lymphocytes Absolute 0.72 (L) 0.80 - 3.00 x10*3/uL    Monocytes Absolute 0.89 (H) 0.05 - 0.80 x10*3/uL    Eosinophils Absolute 0.01 0.00 - 0.40 x10*3/uL    Basophils Absolute 0.02 0.00 - 0.10 x10*3/uL   Comprehensive metabolic panel   Result Value Ref Range    Glucose 111 (H) 74 - 99 mg/dL    Sodium 151 (H) 136 - 145 mmol/L    Potassium 4.9 3.5 - 5.3 mmol/L    Chloride 123 (H) 98 - 107 mmol/L    Bicarbonate 18 (L) 21 - 32 mmol/L    Anion Gap 15 10 - 20 mmol/L    Urea Nitrogen 84 (H) 6 - 23 mg/dL    Creatinine 4.72 (H) 0.50 - 1.30 mg/dL    eGFR 12 (L) >60 mL/min/1.73m*2    Calcium 9.2 8.6 - 10.3 mg/dL    Albumin 3.5 3.4 - 5.0 g/dL    Alkaline Phosphatase 228 (H) 33 - 136 U/L    Total Protein 6.6 6.4 - 8.2 g/dL    AST 29 9 - 39 U/L    Bilirubin, Total 0.5 0.0 - 1.2 mg/dL    ALT 97 (H) 10 - 52 U/L       DATA:   Diagnostic tests reviewed for today's visit:    Labs and meds  BMP, CBC, UA, CXR, CT    ASSESSMENT AND PLAN:  - CHRISTIAN on CKD stage 4/5 (baseline Scr 4.5-4.9): Scr at baseline but pt looks a bit volume contracted  BP: controlled  Hypernatremia: due to dehydration  AGMA, mild from CKD  Hb 10.4    PLAN:  - adding gentle D5W drip 50 ml/h till tomorrow, supportive care, no need of dialysis     Greatly appreciate the opportunity to assist in the care of this patient. Will continue to follow.   patient  at-risk for clinically significant deterioration      Signature: Theo Davis MD  Division of Nephrology and Hypertension

## 2024-11-08 NOTE — PROGRESS NOTES
"Antonio Willis is a 79 y.o. male on day 3 of admission presenting with Pneumonia of right upper lobe due to infectious organism.    Subjective       79-year-old  male, he was admitted because of acute hypoxia and pneumonia.  Seen by me today morning, he was very confused, hallucinating, also discussed with the staff, he was very agitated overnight, he was not using the oxygen, more hallucinations, he had to get Zyprexa, 2 times, and he is still very confused, the patient's wife wife did not want the Zyprexa, went over his goals with the wife, and also according to the nursing staff he is coughing with each bite,.    He is on 5 L of oxygen  Objective     Physical Exam  He is awake, very confused.  Heart S1-S2.  Lungs reduced air entry.  Abdomen is soft nontender.  Legs no edema.    Last Recorded Vitals  Blood pressure 135/70, pulse 62, temperature 36.3 °C (97.3 °F), temperature source Temporal, resp. rate 18, height 1.651 m (5' 5\"), weight 76.7 kg (169 lb), SpO2 94%.  Intake/Output last 3 Shifts:  I/O last 3 completed shifts:  In: - (0 mL/kg)   Out: 200 (2.6 mL/kg) [Urine:200 (0.1 mL/kg/hr)]  Weight: 76.7 kg     Relevant Results  Results for orders placed or performed during the hospital encounter of 11/05/24 (from the past 96 hours)   Sars-CoV-2 PCR   Result Value Ref Range    Coronavirus 2019, PCR Not Detected Not Detected   Influenza A, and B PCR   Result Value Ref Range    Flu A Result Not Detected Not Detected    Flu B Result Not Detected Not Detected   CBC and Auto Differential   Result Value Ref Range    WBC 8.8 4.4 - 11.3 x10*3/uL    nRBC 0.0 0.0 - 0.0 /100 WBCs    RBC 3.62 (L) 4.50 - 5.90 x10*6/uL    Hemoglobin 11.8 (L) 13.5 - 17.5 g/dL    Hematocrit 37.8 (L) 41.0 - 52.0 %     (H) 80 - 100 fL    MCH 32.6 26.0 - 34.0 pg    MCHC 31.2 (L) 32.0 - 36.0 g/dL    RDW 18.9 (H) 11.5 - 14.5 %    Platelets 117 (L) 150 - 450 x10*3/uL    Neutrophils % 77.4 40.0 - 80.0 %    Immature Granulocytes %, " Automated 0.7 0.0 - 0.9 %    Lymphocytes % 11.7 13.0 - 44.0 %    Monocytes % 6.2 2.0 - 10.0 %    Eosinophils % 3.7 0.0 - 6.0 %    Basophils % 0.3 0.0 - 2.0 %    Neutrophils Absolute 6.78 (H) 1.60 - 5.50 x10*3/uL    Immature Granulocytes Absolute, Automated 0.06 0.00 - 0.50 x10*3/uL    Lymphocytes Absolute 1.02 0.80 - 3.00 x10*3/uL    Monocytes Absolute 0.54 0.05 - 0.80 x10*3/uL    Eosinophils Absolute 0.32 0.00 - 0.40 x10*3/uL    Basophils Absolute 0.03 0.00 - 0.10 x10*3/uL   Comprehensive metabolic panel   Result Value Ref Range    Glucose 56 (L) 74 - 99 mg/dL    Sodium 148 (H) 136 - 145 mmol/L    Potassium 5.6 (H) 3.5 - 5.3 mmol/L    Chloride 122 (H) 98 - 107 mmol/L    Bicarbonate 19 (L) 21 - 32 mmol/L    Anion Gap 13 10 - 20 mmol/L    Urea Nitrogen 87 (H) 6 - 23 mg/dL    Creatinine 4.98 (H) 0.50 - 1.30 mg/dL    eGFR 11 (L) >60 mL/min/1.73m*2    Calcium 9.9 8.6 - 10.3 mg/dL    Albumin 3.7 3.4 - 5.0 g/dL    Alkaline Phosphatase 293 (H) 33 - 136 U/L    Total Protein 7.6 6.4 - 8.2 g/dL     (H) 9 - 39 U/L    Bilirubin, Total 0.6 0.0 - 1.2 mg/dL     (H) 10 - 52 U/L   Troponin I, High Sensitivity   Result Value Ref Range    Troponin I, High Sensitivity 17 0 - 20 ng/L   B-Type Natriuretic Peptide   Result Value Ref Range     (H) 0 - 99 pg/mL   ECG 12 lead   Result Value Ref Range    Ventricular Rate 64 BPM    Atrial Rate 64 BPM    MS Interval 212 ms    QRS Duration 160 ms    QT Interval 442 ms    QTC Calculation(Bazett) 455 ms    P Axis 48 degrees    R Axis 253 degrees    T Axis 46 degrees    QRS Count 11 beats    Q Onset 184 ms    P Onset 86 ms    P Offset 135 ms    T Offset 405 ms    QTC Fredericia 451 ms   Basic metabolic panel   Result Value Ref Range    Glucose 137 (H) 74 - 99 mg/dL    Sodium 150 (H) 136 - 145 mmol/L    Potassium 4.3 3.5 - 5.3 mmol/L    Chloride 126 (H) 98 - 107 mmol/L    Bicarbonate 17 (L) 21 - 32 mmol/L    Anion Gap 11 10 - 20 mmol/L    Urea Nitrogen 90 (H) 6 - 23 mg/dL     Creatinine 4.72 (H) 0.50 - 1.30 mg/dL    eGFR 12 (L) >60 mL/min/1.73m*2    Calcium 9.3 8.6 - 10.3 mg/dL   Lavender Top   Result Value Ref Range    Extra Tube Hold for add-ons.    Basic metabolic panel   Result Value Ref Range    Glucose 125 (H) 74 - 99 mg/dL    Sodium 149 (H) 136 - 145 mmol/L    Potassium 4.6 3.5 - 5.3 mmol/L    Chloride 124 (H) 98 - 107 mmol/L    Bicarbonate 16 (L) 21 - 32 mmol/L    Anion Gap 14 10 - 20 mmol/L    Urea Nitrogen 81 (H) 6 - 23 mg/dL    Creatinine 4.70 (H) 0.50 - 1.30 mg/dL    eGFR 12 (L) >60 mL/min/1.73m*2    Calcium 9.2 8.6 - 10.3 mg/dL   Basic metabolic panel   Result Value Ref Range    Glucose 154 (H) 74 - 99 mg/dL    Sodium 149 (H) 136 - 145 mmol/L    Potassium 5.3 3.5 - 5.3 mmol/L    Chloride 126 (H) 98 - 107 mmol/L    Bicarbonate 15 (L) 21 - 32 mmol/L    Anion Gap 13 10 - 20 mmol/L    Urea Nitrogen 78 (H) 6 - 23 mg/dL    Creatinine 4.59 (H) 0.50 - 1.30 mg/dL    eGFR 12 (L) >60 mL/min/1.73m*2    Calcium 9.2 8.6 - 10.3 mg/dL   CBC and Auto Differential   Result Value Ref Range    WBC 7.3 4.4 - 11.3 x10*3/uL    nRBC 0.0 0.0 - 0.0 /100 WBCs    RBC 3.16 (L) 4.50 - 5.90 x10*6/uL    Hemoglobin 10.3 (L) 13.5 - 17.5 g/dL    Hematocrit 33.2 (L) 41.0 - 52.0 %     (H) 80 - 100 fL    MCH 32.6 26.0 - 34.0 pg    MCHC 31.0 (L) 32.0 - 36.0 g/dL    RDW 19.2 (H) 11.5 - 14.5 %    Platelets 94 (L) 150 - 450 x10*3/uL    Neutrophils % 94.1 40.0 - 80.0 %    Immature Granulocytes %, Automated 0.7 0.0 - 0.9 %    Lymphocytes % 4.7 13.0 - 44.0 %    Monocytes % 0.4 2.0 - 10.0 %    Eosinophils % 0.0 0.0 - 6.0 %    Basophils % 0.1 0.0 - 2.0 %    Neutrophils Absolute 6.83 (H) 1.60 - 5.50 x10*3/uL    Immature Granulocytes Absolute, Automated 0.05 0.00 - 0.50 x10*3/uL    Lymphocytes Absolute 0.34 (L) 0.80 - 3.00 x10*3/uL    Monocytes Absolute 0.03 (L) 0.05 - 0.80 x10*3/uL    Eosinophils Absolute 0.00 0.00 - 0.40 x10*3/uL    Basophils Absolute 0.01 0.00 - 0.10 x10*3/uL   Blood Culture    Specimen:  Peripheral Venipuncture; Blood culture   Result Value Ref Range    Blood Culture Loaded on Instrument - Culture in progress    Lactate   Result Value Ref Range    Lactate 1.6 0.4 - 2.0 mmol/L        Medications:  azithromycin, 500 mg, oral, q24h  cefTRIAXone, 2 g, intravenous, q24h  cholecalciferol, 1,000 Units, oral, Daily  famotidine, 10 mg, oral, q48h  heparin (porcine), 5,000 Units, subcutaneous, q8h PAULETTE  ipratropium-albuteroL, 3 mL, nebulization, TID  methylPREDNISolone sodium succinate (PF), 40 mg, intravenous, Daily  polyethylene glycol, 17 g, oral, Daily  rosuvastatin, 10 mg, oral, Nightly  sodium bicarbonate, 650 mg, oral, BID  tamsulosin, 0.4 mg, oral, Nightly      PRN medications: acetaminophen, ipratropium-albuteroL, OLANZapine, ondansetron **OR** ondansetron    CT abdomen pelvis wo IV contrast    Result Date: 11/6/2024  Interpreted By:  Chase Rivera, STUDY: CT ABDOMEN PELVIS WO IV CONTRAST;  11/6/2024 2:59 pm   INDICATION: Signs/Symptoms:nephrolithiasis and history ogf Prostate carcinoma.     COMPARISON: 07/26/2023   ACCESSION NUMBER(S): [id number]   ORDERING CLINICIAN: YUDELKA HANKINS   TECHNIQUE: CT of the abdomen and pelvis was performed. Contiguous axial images were obtained at 3 mm slice thickness through the abdomen and pelvis. Coronal and sagittal reconstructions at 3 mm slice thickness were performed.  No intravenous contrast was administered.   FINDINGS: Please note that the evaluation of vessels, lymph nodes and organs is limited without intravenous contrast.   LOWER CHEST: The included lung bases demonstrate bibasilar bronchiectasis. Bibasilar infiltrates and interstitial thickening. Findings have progressed slightly since the prior exam. Cardiac pacer.   ABDOMEN:   LIVER: Grossly unremarkable. No definite focal liver lesions.   BILE DUCTS: No bile duct dilatation.   GALLBLADDER: Suboptimally distended and evaluated. No definite gallstones.   PANCREAS: Unremarkable pancreas.   SPLEEN:  Unremarkable spleen.   ADRENAL GLANDS: Grossly unremarkable.   KIDNEYS AND URETERS: Cortical thinning of the left kidney with severe left-sided hydronephrosis and hydroureter. Nonobstructing left renal stones. There is a tiny stone within the dependent portion of the dilated distal left ureter measuring approximately 4.5 mm. Also another punctate 2 mm stone near the left UVJ. The larger previously noted distal left ureteral stone is no longer seen.   PELVIS:   BLADDER: Grossly unremarkable.   REPRODUCTIVE ORGANS: Calcifications within the prostate.       ABDOMINAL WALL: Fat containing left inguinal hernia.   BONES: Discogenic degenerative changes. Scoliosis.   No ascites.No retroperitoneal adenopathy.       1.  Persistent severe left-sided hydronephrosis and hydroureter with cortical thinning of the left kidney. However previously noted larger distal left ureteral stone has resolved with residual much smaller 4 mm stone in the distal left ureter as described. 2. Worsening bibasilar bronchiectasis and interstitial thickening and ground-glass infiltrates.     MACRO: None   Signed by: Chase Rivera 11/6/2024 4:17 PM Dictation workstation:   [id number]    ECG 12 lead    Result Date: 11/6/2024  Atrial-paced rhythm with prolonged AV conduction Right bundle branch block Abnormal ECG When compared with ECG of 02-OCT-2024 10:58, (unconfirmed) No significant change was found    XR chest 2 views    Result Date: 11/5/2024  Interpreted By:  Pepe Scruggs, STUDY: XR CHEST 2 VIEWS;  11/5/2024 7:00 pm   INDICATION: Signs/Symptoms:cough.     COMPARISON: Radiographs of the chest dated 08/25/2024.   ACCESSION NUMBER(S): [id number]   ORDERING CLINICIAN: GEORGE LOPEZ   FINDINGS: PA and lateral radiographs of the chest were provided.   Dual lead left subclavian pacemaker is in place.   CARDIOMEDIASTINAL SILHOUETTE: Cardiomediastinal silhouette is mildly enlarged, similar in appearance to prior exam.   LUNGS: There is  redemonstration of the interstitial prominence in the lungs bilaterally, likely representing component of chronic interstitial lung disease, with some increase in airspace opacities in the right upper lung compared to prior study on 08/24/2024. No evidence of pneumothorax or sizable pleural effusion.   ABDOMEN: No remarkable upper abdominal findings.   BONES: No acute osseous changes.       1.  Somewhat increased interstitial markings in the right upper lung compared to prior exam on 08/25/2024, possibly representing developing airspace infiltrate. 2. Prominence of the interstitial markings in the lungs bilaterally, likely representing component of chronic interstitial lung disease.   MACRO: None   Signed by: Pepe Scruggs 11/5/2024 7:12 PM Dictation workstation:   [id number]      Assessment/Plan   Principal Problem:    Pneumonia of right upper lobe due to infectious organism  Active Problems:    Acute pulmonary edema    Acute on chronic hypoxic respiratory failure (Multi)    Interstitial lung disease (Multi)  79-year-old  male, who is known to have a history of multiple sclerosis, with no deficits, history of permanent pacemaker, hypertension, COVID infection about 2 months ago, since then he had been on oxygen, increasing shortness of breath, smoker, history of prostate cancer, history of chronic hydronephrosis of the left kidney, BPH, nephrolithiasis, and he came to the emergency department, because of increasing shortness of breath,.  Acute on chronic respiratory failure, chronic lung disease, multifactorial, could be a pneumonia underlying interstitial lung disease, post-COVID lung disease, also recent pulmonary hypertension with right heart failure and cor pulmonale, he is on IV Solu-Medrol, IV Lasix, IV antibiotics, also according to the staff he is aspirating, could be a combination of aspiration pneumonia also, cardiology and pulmonology is following, we will also get a speech therapy  evaluation,.  Acute delirium, could be could have been secondary to hypoxia, we are getting a palliative care, for symptom control,.  Recent COVID,.  CKD 4, will monitor closely, he does have chronic CKD, history of prostate cancer,.  History of prostate cancer, history of radiation for the prostate and BPH and nephrolithiasis, and chronic hydronephrosis.  Hyperlipidemia same    Discussed with the staff and the wife at the bedside,  PT OT consult       I spent 35 minutes in the professional and overall care of this patient.    Chicho Avina MD

## 2024-11-08 NOTE — PROGRESS NOTES
"Subjective Data:  BP improved   Received IV lasix yesterday.   Remains hypernatremic   Started on D5W by nephrology       Overnight Events:    N/A     Objective Data:  Last Recorded Vitals:  Vitals:    11/08/24 0523 11/08/24 0717 11/08/24 0802 11/08/24 1132   BP: 117/62  135/70 118/75   BP Location: Right arm  Right arm Right arm   Patient Position: Lying  Sitting Lying   Pulse: 65  62 88   Resp: 18  18 18   Temp: 37 °C (98.6 °F)  36.3 °C (97.3 °F) 35.9 °C (96.6 °F)   TempSrc: Temporal  Temporal Temporal   SpO2: 96% 97% 94% 94%   Weight:       Height:           Last Labs:  LABS:  CMP:  Results from last 7 days   Lab Units 11/08/24  0810 11/07/24  0516 11/06/24  1819 11/06/24  1124 11/05/24  1828   SODIUM mmol/L 151* 149* 149* 150* 148*   POTASSIUM mmol/L 4.9 5.3 4.6 4.3 5.6*   CHLORIDE mmol/L 123* 126* 124* 126* 122*   CO2 mmol/L 18* 15* 16* 17* 19*   ANION GAP mmol/L 15 13 14 11 13   BUN mg/dL 84* 78* 81* 90* 87*   CREATININE mg/dL 4.72* 4.59* 4.70* 4.72* 4.98*   EGFR mL/min/1.73m*2 12* 12* 12* 12* 11*   ALBUMIN g/dL 3.5  --   --   --  3.7   ALT U/L 97*  --   --   --  188*   AST U/L 29  --   --   --  130*   BILIRUBIN TOTAL mg/dL 0.5  --   --   --  0.6     CBC:  Results from last 7 days   Lab Units 11/08/24  0810 11/07/24  0516 11/05/24  1828   WBC AUTO x10*3/uL 15.2* 7.3 8.8   HEMOGLOBIN g/dL 10.4* 10.3* 11.8*   HEMATOCRIT % 33.3* 33.2* 37.8*   PLATELETS AUTO x10*3/uL 104* 94* 117*   MCV fL 105* 105* 104*     COAG:     ABO: No results found for: \"ABO\"  HEME/ENDO:     CARDIAC:   Results from last 7 days   Lab Units 11/05/24  1828   TROPHS ng/L 17   BNP pg/mL 127*     Recent Labs     10/31/24  1353 08/29/23  0825 07/30/22  1002 07/26/21  0902   CHOL 105 114 112 118   LDLF  --  55 54 52   LDLCALC 40  --   --   --    HDL 46.8 38.4* 44.8 46.5   TRIG 89 104 67 100      [facility] Results  XR chest 1 view   Final Result        Interstitial changes throughout both lungs right worse than left   favoring chronic interstitial " disease.        Overall appearance unchanged from prior.        Signed by: Marek Fenton 11/7/2024 11:36 AM   Dictation workstation:   [id number]      CT abdomen pelvis wo IV contrast   Final Result   1.  Persistent severe left-sided hydronephrosis and hydroureter with   cortical thinning of the left kidney. However previously noted larger   distal left ureteral stone has resolved with residual much smaller 4   mm stone in the distal left ureter as described.   2. Worsening bibasilar bronchiectasis and interstitial thickening and   ground-glass infiltrates.             MACRO:   None        Signed by: Chase Rivera 11/6/2024 4:17 PM   Dictation workstation:   [id number]      XR chest 2 views   Final Result   1.  Somewhat increased interstitial markings in the right upper lung   compared to prior exam on 08/25/2024, possibly representing   developing airspace infiltrate.   2. Prominence of the interstitial markings in the lungs bilaterally,   likely representing component of chronic interstitial lung disease.        MACRO:   None        Signed by: Pepe Scruggs 11/5/2024 7:12 PM   Dictation workstation:   [id number]           Last I/O:  I/O last 3 completed shifts:  In: - (0 mL/kg)   Out: 200 (2.6 mL/kg) [Urine:200 (0.1 mL/kg/hr)]  Weight: 76.7 kg     Past Cardiology Tests (Last 3 Years):  EKG:  ECG 12 lead 11/05/2024        Echo:  Transthoracic echo (TTE) complete 10/10/2024   1. Left ventricular ejection fraction is normal, by visual estimate at 60-65%.   2. Spectral Doppler shows an impaired relaxation pattern of left ventricular diastolic filling.   3. Left ventricular cavity size is decreased.   4. There is normal right ventricular global systolic function.   5. Mild to moderate tricuspid regurgitation visualized.   6. Moderately elevated right ventricular systolic pressure.    Ejection Fractions:  EF   Date/Time Value Ref Range Status   10/10/2024 10:46 AM 63 %      Cath:  No results found for this or  any previous visit from the past 1095 days.    Stress Test:  No results found for this or any previous visit from the past 1095 days.    Cardiac Imaging:  No results found for this or any previous visit from the past 1095 days.      Inpatient Medications:  Scheduled medications   Medication Dose Route Frequency    cholecalciferol  1,000 Units oral Daily    famotidine  10 mg oral q48h    heparin (porcine)  5,000 Units subcutaneous q8h PAULETTE    ipratropium-albuteroL  3 mL nebulization TID    melatonin  4.5 mg oral q PM    [START ON 11/9/2024] methylPREDNISolone sodium succinate (PF)  20 mg intravenous Daily    Followed by    [START ON 11/11/2024] methylPREDNISolone sodium succinate (PF)  10 mg intravenous Daily    OLANZapine  2.5 mg oral Nightly    piperacillin-tazobactam  2.25 g intravenous q6h    polyethylene glycol  17 g oral Daily    rosuvastatin  10 mg oral Nightly    sodium bicarbonate  650 mg oral BID    tamsulosin  0.4 mg oral Nightly     PRN medications   Medication    acetaminophen    ipratropium-albuteroL    OLANZapine    ondansetron    Or    ondansetron     Continuous Medications   Medication Dose Last Rate    dextrose 5%  50 mL/hr 50 mL/hr (11/08/24 1232)    oxygen  2 L/min 5 L/min (11/08/24 0946)       Physical Exam:  GENERAL: alert, cooperative, pleasant, in no acute distress  SKIN: warm, dry  NECK: no JVD  CARDIAC: Regular rate and rhythm no murmurs  PULMONARY: SOB + cough and expiratory wheezes.  On supplemental oxygen   ABDOMEN: soft, nondistended  EXTREMITIES: no lower extremity edema  NEURO: Alert and oriented x 3.  Grossly normal.  Moves all 4 extremities.     Assessment/Plan   Antonio Willis is a 79 y.o. male with past medical history significant for hyperlipidemia with statin intolerance, sinus node dysfunction status post dual-chamber pacemaker placement 10/17/2011, bifascicular block, upper GI bleed in the setting of gastric and duodenal ulcers, interstitial lung disease, COPD, chronic hypoxic  respiratory failure, chronic tobacco use,  chronic kidney disease, multiple sclerosis. Presented with shortness of breath. Cardiology is consulted for Patient with low BP, blood pressure ranging 86-104mmHG, not an antihypertensive medication and troponin negative and EKG unremarkable.     Patient presents with 2 to 3-day history of increasing shortness of breath, generalized malaise and fatigue, and low oxygen saturation at home. No chest discomfort.     Acute on chronic hypoxic respiratory failure due to interstitial lung disease, COPD exacerbation, possible pneumonia  Asymptomatic hypotension due to volume depletion - resolved  CKD 4  Hyperlipidemia  History of sinus node dysfunction status post pacemaker placement  Bifascicular block  CHRISTIAN on CKD- Nephrology now following.  Hypernatremia      Recommendation:  Getting IV hydration per nephrology   Monitor volume status   Cont rosuvastatin      Cardiology will follow peripherally  Please call with questions       Code Status:  DNR and No Intubation      Tobi Merchant, APRN-CNP

## 2024-11-08 NOTE — PROGRESS NOTES
Speech-Language Pathology    Inpatient Speech-Language Pathology Clinical Swallow Evaluation    Patient Name: Antonio Willis  MRN: [id number]  : 1945  Today's Date: 24   Time Calculation  Start Time: 844  Stop Time: 909  Time Calculation (min): 25 min        RECOMMENDATIONS:    1.    NPO WITH SLP REASSESSMENT ON  TO DETERMINE APPROPRIATENESS OF MODIFIED BARIUM SWALLOW (DO NOT RECOMMEND PO MEDS)     Assessment:  Assessment Results: Patient seen for swallow assessment. Spouse present at bedside and reporting patient not at baseline level of functioning. HOB elevated with generalized weakness and confusion noted. Spouse reported patient hallucinating since receiving new medication. Patient able to follow commands for oral motor movements with generalized weakness noted, however confusion evident. Patient given ice chips x2. Bolus formation slowed with no swallow elicited given initial ice chip. As second ice chip presented, cough occurred. Swallow completed with spontaneous reswallows performed. Coughing persisted. Discontinued further bolus trials due to overt s/s aspiration identified. Do not feel patient safe for po diet at this time. Given altered cognition and weakness, do not feel patient ready to proceed with instrumental swallow assessment. Spouse endorsing post prandial cough pta, which may likely be a related to MS. Will need to reassess swallow at bedside to determine readiness and appropriateness of MBSS.     Baseline Assessment:  Respiratory Status: Oxygen via nasal cannula  History of Intubation: No        Behavior/Cognition: Alert, Confused  Patient Positioning: Upright in Bed  Baseline Vocal Quality: Weak    Oral-Motor Assessment:  Dentition: Adequate/Natural  Oral Motor: Impaired Function    Plan:  SLP Plan: Skilled SLP  SLP Frequency: 3x per week  Duration: 2 weeks  SLP Discharge Recommendations: Continue skilled speech therapy services post discharge (as needed)  Discussed POC:  "Patient, Caregiver/family  Discussed Risks/Benefits: Yes  Patient/Caregiver Agreeable: Yes    Goals established 11/08/24:   - Pt will complete a Modified Barium Swallow Study to fully assess swallow function, safety with PO intake and determine recommendations for least restrictive consistencies.        General Visit Information:  Patient admitted: 11/5/24    Past Medical History: hyperlipidemia, GERD, BPH, MS     Chief Complaint/Reason for admission: admitted with SOB and productive cough. Diagnosed with pneumonia, pulmonary edema, and respiratory failure with interstitial lung disease.    Relevant Imaging Results: CXR 11/7/24> \"nterstitial changes throughout both lungs right worse than left favoring chronic interstitial disease.\"    Living Environment: Home  Reason for Referral: concern for dysphagia  Ordering Physician: Dr. Kerns  Current Diet : Puree/Nectar liquids    Pain:  None reported    Treatment:    N/A    Inpatient Education:  Family educated on current swallow function  Family education results: gave verbal understanding    Following completion of session:  *Bed alarm: on  *Bed position: lowered                                           "

## 2024-11-08 NOTE — PROGRESS NOTES
Occupational Therapy    Evaluation/Treatment    Patient Name: Antonio Willis  MRN: [id number]  Department: [place] [address]  Room: [address] [place] [address]  Today's Date: 11/08/24  Time Calculation  Start Time: 1456  Stop Time: 1540  Time Calculation (min): 44 min       Assessment:  OT Assessment: Pt confused A&O x2, pt requiring assist to complete bathing and grooming tasks. Energy conservation and deep breathing strategies instructed to pt throughout session. Pt has potential to progress while in house with OT/PT. Pt would benefit from mod intensity pending progress with functional mobility and support at home.      Prognosis: Good  Barriers to Discharge: None  Evaluation/Treatment Tolerance: Patient tolerated treatment well, Patient limited by fatigue  Medical Staff Made Aware: Yes  End of Session Communication: Bedside nurse, PCT/NA/CTA  End of Session Patient Position: Alarm on, Up in chair  OT Assessment Results: Decreased ADL status, Decreased upper extremity range of motion, Decreased upper extremity strength, Decreased safe judgment during ADL, Decreased cognition, Decreased endurance, Decreased functional mobility, Decreased IADLs  Prognosis: Good  Barriers to Discharge: None  Evaluation/Treatment Tolerance: Patient tolerated treatment well, Patient limited by fatigue  Medical Staff Made Aware: Yes  Strengths: Attitude of self, Support of Caregivers, Premorbid level of function  Barriers to Participation: Ability to acquire knowledge, Comorbidities  Plan:  Treatment Interventions: ADL retraining, Functional transfer training, UE strengthening/ROM, Endurance training, Compensatory technique education  OT Frequency: 3 times per week  OT Discharge Recommendations: Moderate intensity level of continued care  OT Recommended Transfer Status: Assist of 1  OT - OK to Discharge: Yes  Treatment Interventions: ADL retraining, Functional transfer training, UE strengthening/ROM, Endurance training, Compensatory technique  education    Subjective   Current Problem:  1. Pneumonia of right upper lobe due to infectious organism        2. Hypoglycemia        3. Chronic kidney disease, unspecified CKD stage        4. Pacemaker  CANCELED: Cardiac device check - Inpatient    CANCELED: Cardiac device check - Inpatient        General:   OT Received On: 11/08/24  General  Reason for Referral: 79 y.o. male presenting with increasing shortness of breath and cough without any sputum production. history of multiple sclerosis, He has a history of COVID infection few months ago but currently has no COVID-related symptoms.  Referred By: МАРИНА Delaney-CNP  Past Medical History Relevant to Rehab: Cardiac pacemaker, Chronic anemia, CKD (chronic kidney disease), stage IV (Multi), Granuloma annulare, Hemiplegia, unspecified affecting right dominant side (Multi), History of GI bleed, History of prostate cancer, Hydronephrosis concurrent with and due to calculi of kidney and ureter, Hyperkalemia, Hyperlipidemia, unspecified, Multiple sclerosis (Multi), Pyuria, Recurrent kidney stones, and Tremor,  Family/Caregiver Present: Yes  Caregiver Feedback: wife present and encouraging  Prior to Session Communication: Bedside nurse  Patient Position Received: Bed, 3 rail up, Alarm on  Preferred Learning Style: auditory, kinesthetic, verbal, visual  General Comment: Pt agreeable to OT  Precautions:  Medical Precautions: Fall precautions, Oxygen therapy device and L/min    Pain:  Pain Assessment  Pain Assessment: 0-10  0-10 (Numeric) Pain Score: 0 - No pain    Objective   Cognition:  Overall Cognitive Status: Impaired  Orientation Level: Disoriented to situation, Disoriented to time (pt able to state year, requiries cues for month, pt stating he is at his house (baseline x3))  Problem Solving: Exceptions to WFL  Processing Speed: Delayed     Home Living:  Type of Home: Apartment  Lives With: Spouse  Home Adaptive Equipment: Walker rolling or standard,  Wheelchair-power  Home Layout: One level  Bathroom Shower/Tub: Tub/shower unit (chair and grab bars)  Bathroom Equipment: Shower chair with back  Home Living Comments: 1 floor apartment  Prior Function:  Level of [place]: Needs assistance with ADLs, Needs assistance with homemaking  Receives Help From: Family (wife, caregiver 4 hours per day/4 days per week.)  ADL Assistance: Needs assistance (pt requires assist for some dressing, SUP for bathing seated in the shower, wife/caregiver complete IADLs)  Homemaking Assistance: Needs assistance  Ambulatory Assistance: Independent (pt is indep with his rollator or power wheelchair, transfers with independence. Prior to August pt was indep with ambulation, recently he has not been able to ambulate far distances. Has been going to OP PT, SP02 desats limiting mobility.)  Vocational: Retired  Leisure: gardening, fishing  Hand Dominance: Right  Prior Function Comments: wife reports decline in the past 2 weeks. stopped going to OP OT due to fatigue and continued OP PT for ambulation. recently stand pivots to chair or wheelchair. has an adjustable bed     ADL:  Bathing Assistance: Moderate  UE Dressing Assistance: Moderate  LE Dressing Assistance: Maximal  Activities of Daily Living: UE Bathing  UE Bathing Level of Assistance: Minimum assistance  UE Bathing Where Assessed: Bed level  UE Bathing Comments: pt washed B UE's and chest with cues to complete task, pt washed B LE's with assist to wash feet, assist to wash jaye area. Pt requiring increased time to complete due to fatigue.       UE Dressing  UE Dressing Level of Assistance: Moderate assistance  UE Dressing Where Assessed: Bed level  UE Dressing Comments: pt able to pull B UE's in shirt, assist to fully thread into B UE's, pt able to pull over head, assist to pull shirt over trunk    LE Dressing  LE Dressing: Yes  Pants Level of Assistance: Maximum assistance  Adult Briefs Level of Assistance: Maximum assistance  LE  Dressing Where Assessed: Edge of bed  LE Dressing Comments: pt requiring assist to don briefs and pants to thread into B LE's and pull up over hips while standing with B UE's on the FWW  Activity Tolerance:  Endurance: Tolerates less than 10 min exercise, no significant change in vital signs  Activity Tolerance Comments: pt fatigued after ADLs and transfer to chair, deep breathing strategies throughout  Functional Standing Tolerance:     Bed Mobility/Transfers: Bed Mobility  Bed Mobility: Yes  Bed Mobility 1  Bed Mobility 1: Supine to sitting  Level of Assistance 1: Moderate assistance, Moderate verbal cues  Bed Mobility Comments 1: cues to sequence due to confusion, assist at trunk and to fully bring B LE's off bed    Transfers  Transfer: Yes  Transfer 1  Technique 1: Sit to stand, Stand to sit  Transfer Device 1: Gait belt, Walker  Transfer Level of Assistance 1: Minimum assistance  Trials/Comments 1: min A to stand from the bed, assist to sit upon transfer to chair, tactile cues for arms rests upon sitting  Transfers 2  Transfer From 2: Bed to  Transfer to 2: Chair with arms  Technique 2: Stand pivot  Transfer Device 2: Walker, Gait belt  Transfer Level of Assistance 2: Minimum assistance  Trials/Comments 2: pt able to take 2-3 turning steps to pivot to chair, min A for steadying, pt fatigued requiring deep breathing strategies    Sitting Balance:  Static Sitting Balance  Static Sitting-Balance Support: Feet supported  Static Sitting-Level of Assistance: Contact guard  Standing Balance:  Static Standing Balance  Static Standing-Balance Support: Bilateral upper extremity supported  Static Standing-Level of Assistance: Minimum assistance  Static Standing-Comment/Number of Minutes: min A for balance standing EOB, pt stood for ~1 minute to complete LB dressing     Vision:Vision - Basic Assessment  Current Vision: No visual deficits  Sensation:  Light Touch: No apparent deficits  Strength:  Strength Comments:  decreased UE strength  Coordination:  Movements are Fluid and Coordinated: No   Hand Function:  Hand Function  Gross Grasp: Functional  Coordination: Impaired  Extremities:  LUE/RUE: Within Functional Limits (decreased shoulder flexion)    Outcome Measures: [facility] Daily Activity  Putting on and taking off regular lower body clothing: A lot  Bathing (including washing, rinsing, drying): A little  Putting on and taking off regular upper body clothing: A lot  Toileting, which includes using toilet, bedpan or urinal: A lot  Taking care of personal grooming such as brushing teeth: A little  Eating Meals: A little  Daily Activity - Total Score: 15      Education Documentation  Handouts, taught by Melany Geiger OT at 11/8/2024  4:14 PM.  Learner: Family, Patient  Readiness: Acceptance  Method: Explanation, Demonstration  Response: Needs Reinforcement    Body Mechanics, taught by Melany Geiger OT at 11/8/2024  4:14 PM.  Learner: Family, Patient  Readiness: Acceptance  Method: Explanation, Demonstration  Response: Needs Reinforcement    Home Exercise Program, taught by Melany Geiger OT at 11/8/2024  4:14 PM.  Learner: Family, Patient  Readiness: Acceptance  Method: Explanation, Demonstration  Response: Needs Reinforcement    ADL Training, taught by Melany Geiger OT at 11/8/2024  4:14 PM.  Learner: Family, Patient  Readiness: Acceptance  Method: Explanation, Demonstration  Response: Needs Reinforcement    Education Comments  No comments found.           Goals:  Encounter Problems       Encounter Problems (Active)       ADLs       Patient will perform UB and LB bathing with modified independent level of assistance.       Start:  11/08/24            Patient with complete upper body dressing with modified independent level of assistance donning and doffing all UE clothes.       Start:  11/08/24            Patient with complete lower body dressing with minimal assist  level of assistance donning and doffing all LE clothes.        Start:  11/08/24            Patient will feed self with set-up level of assistance.       Start:  11/08/24            Patient will complete daily grooming tasks brushing teeth, shaving, and washing face/hair with set-up level of assistance and PRN adaptive equipment.       Start:  11/08/24               MOBILITY       Patient will perform Functional mobility min Household distances/Community Distances with modified independent level of assistance and front wheeled walker in order to improve safety and functional mobility.       Start:  11/08/24               TRANSFERS       Patient will perform bed mobility minimal assist  level of assistance and bed rails in order to improve safety and independence with mobility       Start:  11/08/24            Patient will complete functional transfer to chair with front wheeled walker with stand by assist level of assistance.       Start:  11/08/24

## 2024-11-08 NOTE — PROGRESS NOTES
"Antonio Willis is a 79 y.o. male on day 3 of admission presenting with Pneumonia of right upper lobe due to infectious organism.    Subjective   Patient received 80mg IV lasix yesterday. His breathing appears less labored. He is more encephalopathic. Palliative care involved and he is now DNR/DNI. He failed swallow eval and NGT recommended.       Objective   GEN: appears confused. Wants to get up and go to his car  ENT: wearing nasal cannula 5L/min  CV: RRR, no m/g/r  LUNGS: moderate effort, less rhonchorous and crackly than yesterday      Physical Exam    Last Recorded Vitals  Blood pressure 118/75, pulse 88, temperature 35.9 °C (96.6 °F), temperature source Temporal, resp. rate 18, height 1.651 m (5' 5\"), weight 76.7 kg (169 lb), SpO2 94%.  Intake/Output last 3 Shifts:  I/O last 3 completed shifts:  In: - (0 mL/kg)   Out: 200 (2.6 mL/kg) [Urine:200 (0.1 mL/kg/hr)]  Weight: 76.7 kg     Relevant Results  Scheduled medications  cholecalciferol, 1,000 Units, oral, Daily  famotidine, 10 mg, oral, q48h  heparin (porcine), 5,000 Units, subcutaneous, q8h PAULETTE  ipratropium-albuteroL, 3 mL, nebulization, TID  melatonin, 4.5 mg, oral, q PM  methylPREDNISolone sodium succinate (PF), 40 mg, intravenous, Daily  OLANZapine, 2.5 mg, oral, Nightly  piperacillin-tazobactam, 2.25 g, intravenous, q6h  polyethylene glycol, 17 g, oral, Daily  rosuvastatin, 10 mg, oral, Nightly  sodium bicarbonate, 650 mg, oral, BID  tamsulosin, 0.4 mg, oral, Nightly      Continuous medications  dextrose 5%, 60 mL/hr, Last Rate: 60 mL/hr (11/08/24 1041)  oxygen, 2 L/min, Last Rate: 5 L/min (11/08/24 0946)      PRN medications  PRN medications: acetaminophen, ipratropium-albuteroL, OLANZapine, ondansetron **OR** ondansetron    Results for orders placed or performed during the hospital encounter of 11/05/24 (from the past 24 hours)   CBC and Auto Differential   Result Value Ref Range    WBC 15.2 (H) 4.4 - 11.3 x10*3/uL    nRBC 0.0 0.0 - 0.0 /100 WBCs    " RBC 3.18 (L) 4.50 - 5.90 x10*6/uL    Hemoglobin 10.4 (L) 13.5 - 17.5 g/dL    Hematocrit 33.3 (L) 41.0 - 52.0 %     (H) 80 - 100 fL    MCH 32.7 26.0 - 34.0 pg    MCHC 31.2 (L) 32.0 - 36.0 g/dL    RDW 19.4 (H) 11.5 - 14.5 %    Platelets 104 (L) 150 - 450 x10*3/uL    Neutrophils % 87.6 40.0 - 80.0 %    Immature Granulocytes %, Automated 1.6 (H) 0.0 - 0.9 %    Lymphocytes % 4.7 13.0 - 44.0 %    Monocytes % 5.9 2.0 - 10.0 %    Eosinophils % 0.1 0.0 - 6.0 %    Basophils % 0.1 0.0 - 2.0 %    Neutrophils Absolute 13.30 (H) 1.60 - 5.50 x10*3/uL    Immature Granulocytes Absolute, Automated 0.24 0.00 - 0.50 x10*3/uL    Lymphocytes Absolute 0.72 (L) 0.80 - 3.00 x10*3/uL    Monocytes Absolute 0.89 (H) 0.05 - 0.80 x10*3/uL    Eosinophils Absolute 0.01 0.00 - 0.40 x10*3/uL    Basophils Absolute 0.02 0.00 - 0.10 x10*3/uL   Comprehensive metabolic panel   Result Value Ref Range    Glucose 111 (H) 74 - 99 mg/dL    Sodium 151 (H) 136 - 145 mmol/L    Potassium 4.9 3.5 - 5.3 mmol/L    Chloride 123 (H) 98 - 107 mmol/L    Bicarbonate 18 (L) 21 - 32 mmol/L    Anion Gap 15 10 - 20 mmol/L    Urea Nitrogen 84 (H) 6 - 23 mg/dL    Creatinine 4.72 (H) 0.50 - 1.30 mg/dL    eGFR 12 (L) >60 mL/min/1.73m*2    Calcium 9.2 8.6 - 10.3 mg/dL    Albumin 3.5 3.4 - 5.0 g/dL    Alkaline Phosphatase 228 (H) 33 - 136 U/L    Total Protein 6.6 6.4 - 8.2 g/dL    AST 29 9 - 39 U/L    Bilirubin, Total 0.5 0.0 - 1.2 mg/dL    ALT 97 (H) 10 - 52 U/L                                     Assessment/Plan   Assessment & Plan  Pneumonia of right upper lobe due to infectious organism    Acute pulmonary edema    Acute on chronic hypoxic respiratory failure (Multi)    Interstitial lung disease (Multi)    Acute metabolic encephalopathy    Summary:  79 y.o. male admitted on 11/5/2024  8:13 PM for worsening dyspnea, weakness and sputum production. Lab work is negative for infectious process, however, he does have some increased opacification on his chest x-ray which may  be suggestive for community-acquired pneumonia.  He is currently requiring 5 L/min O2 supplementation.  He also states that he feels more weak which could be secondary to an MS flareup.  This could be a combination of CAP vs ILD flare. Breathing has improved after lasix on 11/7/2024. He is more encephalopathic and hypernatremic. IV fluids started by nephro.      Recommendations:  -Would cautiously hydrate this patient given his propensity to develop pulmonary edema. Discussed with cardiology service who have placed him on IV fluids. Difficult situation to manage as he failed his swallow eval and is effectively NPO  -Recommend total 5-7 days antibiotics for possible CAP. Check urine Strep and Legionella Ag.  -Continue IV steroids once daily. Will start tapering over the weekend  -O2 supplementation to keep SpO2 90-92%. Currently increased to 5L/min  -Advised to find a pulmonologist in [place] as they will be living there for the next 5 months  -Bronchopulmonary hygiene  -Bronchodilators  -Updated patient's wife at bedside      Pallavi Mcnally, DO  Pulmonary & Critical Care  11/8/2024 12:03 PM

## 2024-11-08 NOTE — PROGRESS NOTES
11/08/24 1014   Discharge Planning   Living Arrangements Spouse/significant other   Support Systems Spouse/significant other   Assistance Needed Per spouse patient normally A&0x4, has MS, mobility is dependent for dressing, assist for toileting, 1 person assist to stand pivot transfer from bed to motorized wheelchiar, has shower chair. [facility] Patient on 2 L 02 continuous at home.   Type of Residence Private residence   Number of Stairs to Enter Residence 0   Number of Stairs Within Residence 0   Who is requesting discharge planning? Provider   Home or Post Acute Services Other (Comment)  (TBD)   Expected Discharge Disposition Othe  (Plan TBD: request made to provider to order PT/ OT for evaluation per spouse request. new confusion today. Patient on 5L here and normally on 2L.)

## 2024-11-09 LAB
ALBUMIN SERPL BCP-MCNC: 3.6 G/DL (ref 3.4–5)
ANION GAP SERPL CALC-SCNC: 14 MMOL/L (ref 10–20)
ATRIAL RATE: 64 BPM
BUN SERPL-MCNC: 81 MG/DL (ref 6–23)
CALCIUM SERPL-MCNC: 9.4 MG/DL (ref 8.6–10.3)
CHLORIDE SERPL-SCNC: 120 MMOL/L (ref 98–107)
CO2 SERPL-SCNC: 19 MMOL/L (ref 21–32)
CREAT SERPL-MCNC: 4.57 MG/DL (ref 0.5–1.3)
EGFRCR SERPLBLD CKD-EPI 2021: 12 ML/MIN/1.73M*2
GLUCOSE BLD MANUAL STRIP-MCNC: 190 MG/DL (ref 74–99)
GLUCOSE SERPL-MCNC: 130 MG/DL (ref 74–99)
P AXIS: 48 DEGREES
P OFFSET: 135 MS
P ONSET: 86 MS
PHOSPHATE SERPL-MCNC: 4.7 MG/DL (ref 2.5–4.9)
POTASSIUM SERPL-SCNC: 5 MMOL/L (ref 3.5–5.3)
PR INTERVAL: 212 MS
Q ONSET: 184 MS
QRS COUNT: 11 BEATS
QRS DURATION: 160 MS
QT INTERVAL: 442 MS
QTC CALCULATION(BAZETT): 455 MS
QTC FREDERICIA: 451 MS
R AXIS: 253 DEGREES
SODIUM SERPL-SCNC: 148 MMOL/L (ref 136–145)
T AXIS: 46 DEGREES
T OFFSET: 405 MS
VENTRICULAR RATE: 64 BPM

## 2024-11-09 PROCEDURE — 94668 MNPJ CHEST WALL SBSQ: CPT

## 2024-11-09 PROCEDURE — 2500000004 HC RX 250 GENERAL PHARMACY W/ HCPCS (ALT 636 FOR OP/ED): Performed by: HOSPITALIST

## 2024-11-09 PROCEDURE — 2500000005 HC RX 250 GENERAL PHARMACY W/O HCPCS: Performed by: INTERNAL MEDICINE

## 2024-11-09 PROCEDURE — 99233 SBSQ HOSP IP/OBS HIGH 50: CPT | Performed by: INTERNAL MEDICINE

## 2024-11-09 PROCEDURE — 2500000004 HC RX 250 GENERAL PHARMACY W/ HCPCS (ALT 636 FOR OP/ED): Performed by: INTERNAL MEDICINE

## 2024-11-09 PROCEDURE — 80069 RENAL FUNCTION PANEL: CPT | Performed by: INTERNAL MEDICINE

## 2024-11-09 PROCEDURE — 1100000001 HC PRIVATE ROOM DAILY

## 2024-11-09 PROCEDURE — 36415 COLL VENOUS BLD VENIPUNCTURE: CPT | Performed by: INTERNAL MEDICINE

## 2024-11-09 PROCEDURE — 2500000002 HC RX 250 W HCPCS SELF ADMINISTERED DRUGS (ALT 637 FOR MEDICARE OP, ALT 636 FOR OP/ED): Performed by: INTERNAL MEDICINE

## 2024-11-09 PROCEDURE — 2500000001 HC RX 250 WO HCPCS SELF ADMINISTERED DRUGS (ALT 637 FOR MEDICARE OP): Performed by: INTERNAL MEDICINE

## 2024-11-09 PROCEDURE — 94640 AIRWAY INHALATION TREATMENT: CPT

## 2024-11-09 PROCEDURE — 82947 ASSAY GLUCOSE BLOOD QUANT: CPT

## 2024-11-09 RX ORDER — DEXTROSE MONOHYDRATE 50 MG/ML
50 INJECTION, SOLUTION INTRAVENOUS CONTINUOUS
Status: ACTIVE | OUTPATIENT
Start: 2024-11-09 | End: 2024-11-10

## 2024-11-09 RX ADMIN — SODIUM BICARBONATE 650 MG: 650 TABLET ORAL at 20:33

## 2024-11-09 RX ADMIN — HEPARIN SODIUM 5000 UNITS: 5000 INJECTION, SOLUTION INTRAVENOUS; SUBCUTANEOUS at 05:47

## 2024-11-09 RX ADMIN — PIPERACILLIN SODIUM AND TAZOBACTAM SODIUM 2.25 G: 2; .25 INJECTION, SOLUTION INTRAVENOUS at 14:46

## 2024-11-09 RX ADMIN — PIPERACILLIN SODIUM AND TAZOBACTAM SODIUM 2.25 G: 2; .25 INJECTION, SOLUTION INTRAVENOUS at 20:33

## 2024-11-09 RX ADMIN — FAMOTIDINE 10 MG: 20 TABLET, FILM COATED ORAL at 21:41

## 2024-11-09 RX ADMIN — IPRATROPIUM BROMIDE AND ALBUTEROL SULFATE 3 ML: 2.5; .5 SOLUTION RESPIRATORY (INHALATION) at 07:29

## 2024-11-09 RX ADMIN — PIPERACILLIN SODIUM AND TAZOBACTAM SODIUM 2.25 G: 2; .25 INJECTION, SOLUTION INTRAVENOUS at 02:36

## 2024-11-09 RX ADMIN — OLANZAPINE 2.5 MG: 5 TABLET, FILM COATED ORAL at 20:33

## 2024-11-09 RX ADMIN — Medication 4 L/MIN: at 17:28

## 2024-11-09 RX ADMIN — OLANZAPINE 5 MG: 10 INJECTION, POWDER, FOR SOLUTION INTRAMUSCULAR at 06:40

## 2024-11-09 RX ADMIN — Medication 1000 UNITS: at 09:49

## 2024-11-09 RX ADMIN — Medication 4 L/MIN: at 19:04

## 2024-11-09 RX ADMIN — ROSUVASTATIN CALCIUM 10 MG: 10 TABLET, FILM COATED ORAL at 20:33

## 2024-11-09 RX ADMIN — SODIUM BICARBONATE 650 MG: 650 TABLET ORAL at 09:49

## 2024-11-09 RX ADMIN — METHYLPREDNISOLONE SODIUM SUCCINATE 20 MG: 40 INJECTION, POWDER, FOR SOLUTION INTRAMUSCULAR; INTRAVENOUS at 09:49

## 2024-11-09 RX ADMIN — HEPARIN SODIUM 5000 UNITS: 5000 INJECTION, SOLUTION INTRAVENOUS; SUBCUTANEOUS at 14:46

## 2024-11-09 RX ADMIN — Medication 4.5 MG: at 20:32

## 2024-11-09 RX ADMIN — HEPARIN SODIUM 5000 UNITS: 5000 INJECTION, SOLUTION INTRAVENOUS; SUBCUTANEOUS at 21:42

## 2024-11-09 RX ADMIN — DEXTROSE MONOHYDRATE 50 ML/HR: 50 INJECTION, SOLUTION INTRAVENOUS at 12:34

## 2024-11-09 RX ADMIN — IPRATROPIUM BROMIDE AND ALBUTEROL SULFATE 3 ML: 2.5; .5 SOLUTION RESPIRATORY (INHALATION) at 19:04

## 2024-11-09 RX ADMIN — PIPERACILLIN SODIUM AND TAZOBACTAM SODIUM 2.25 G: 2; .25 INJECTION, SOLUTION INTRAVENOUS at 09:49

## 2024-11-09 RX ADMIN — TAMSULOSIN HYDROCHLORIDE 0.4 MG: 0.4 CAPSULE ORAL at 20:33

## 2024-11-09 RX ADMIN — IPRATROPIUM BROMIDE AND ALBUTEROL SULFATE 3 ML: 2.5; .5 SOLUTION RESPIRATORY (INHALATION) at 14:08

## 2024-11-09 RX ADMIN — IPRATROPIUM BROMIDE AND ALBUTEROL SULFATE 3 ML: 2.5; .5 SOLUTION RESPIRATORY (INHALATION) at 16:58

## 2024-11-09 ASSESSMENT — COGNITIVE AND FUNCTIONAL STATUS - GENERAL
MOBILITY SCORE: 12
PERSONAL GROOMING: A LITTLE
DRESSING REGULAR LOWER BODY CLOTHING: A LOT
MOBILITY SCORE: 12
DRESSING REGULAR UPPER BODY CLOTHING: A LOT
WALKING IN HOSPITAL ROOM: A LOT
TURNING FROM BACK TO SIDE WHILE IN FLAT BAD: A LOT
DAILY ACTIVITIY SCORE: 14
STANDING UP FROM CHAIR USING ARMS: A LOT
WALKING IN HOSPITAL ROOM: A LOT
MOVING TO AND FROM BED TO CHAIR: A LOT
MOVING TO AND FROM BED TO CHAIR: A LOT
MOVING FROM LYING ON BACK TO SITTING ON SIDE OF FLAT BED WITH BEDRAILS: A LOT
HELP NEEDED FOR BATHING: A LOT
CLIMB 3 TO 5 STEPS WITH RAILING: A LOT
DAILY ACTIVITIY SCORE: 14
EATING MEALS: A LITTLE
STANDING UP FROM CHAIR USING ARMS: A LOT
CLIMB 3 TO 5 STEPS WITH RAILING: A LOT
TOILETING: A LOT
DRESSING REGULAR LOWER BODY CLOTHING: A LOT
HELP NEEDED FOR BATHING: A LOT
EATING MEALS: A LITTLE
TURNING FROM BACK TO SIDE WHILE IN FLAT BAD: A LOT
PERSONAL GROOMING: A LITTLE
DRESSING REGULAR UPPER BODY CLOTHING: A LOT
TOILETING: A LOT
MOVING FROM LYING ON BACK TO SITTING ON SIDE OF FLAT BED WITH BEDRAILS: A LOT

## 2024-11-09 ASSESSMENT — PAIN SCALES - GENERAL
PAINLEVEL_OUTOF10: 0 - NO PAIN

## 2024-11-09 ASSESSMENT — PAIN - FUNCTIONAL ASSESSMENT
PAIN_FUNCTIONAL_ASSESSMENT: 0-10

## 2024-11-09 NOTE — PROGRESS NOTES
"Antonio Willis is a 79 y.o. male on day 4 of admission presenting with Pneumonia of right upper lobe due to infectious organism.    Subjective   Appears less agitated this afternoon. No complaints       Objective   GEN: resting and breathing comfortably  EYES: SPENCER, EOMI  ENT: wearing O2 nasal cannula  CV: RRR, no m/g/r  LUNGS: moderate effort, clear bilaterally, no w/r/r    Physical Exam    Last Recorded Vitals  Blood pressure 130/89, pulse 64, temperature 34.8 °C (94.6 °F), temperature source Temporal, resp. rate 16, height 1.651 m (5' 5\"), weight 76.7 kg (169 lb), SpO2 95%.  Intake/Output last 3 Shifts:  I/O last 3 completed shifts:  In: 614.2 (8 mL/kg) [I.V.:564.2 (7.4 mL/kg); IV Piggyback:50]  Out: 650 (8.5 mL/kg) [Urine:650 (0.2 mL/kg/hr)]  Weight: 76.7 kg     Relevant Results  Scheduled medications  cholecalciferol, 1,000 Units, oral, Daily  famotidine, 10 mg, oral, q48h  heparin (porcine), 5,000 Units, subcutaneous, q8h PAULETTE  ipratropium-albuteroL, 3 mL, nebulization, TID  melatonin, 4.5 mg, oral, q PM  methylPREDNISolone sodium succinate (PF), 20 mg, intravenous, Daily   Followed by  [START ON 11/11/2024] methylPREDNISolone sodium succinate (PF), 10 mg, intravenous, Daily  OLANZapine, 2.5 mg, oral, Nightly  oxygen, , inhalation, Continuous - Inhalation  piperacillin-tazobactam, 2.25 g, intravenous, q6h  polyethylene glycol, 17 g, oral, Daily  rosuvastatin, 10 mg, oral, Nightly  sodium bicarbonate, 650 mg, oral, BID  tamsulosin, 0.4 mg, oral, Nightly      Continuous medications  dextrose 5%, 50 mL/hr, Last Rate: 50 mL/hr (11/09/24 1843)      PRN medications  PRN medications: acetaminophen, ipratropium-albuteroL, OLANZapine, ondansetron **OR** ondansetron    Results for orders placed or performed during the hospital encounter of 11/05/24 (from the past 24 hours)   Renal function panel   Result Value Ref Range    Glucose 130 (H) 74 - 99 mg/dL    Sodium 148 (H) 136 - 145 mmol/L    Potassium 5.0 3.5 - 5.3 mmol/L "    Chloride 120 (H) 98 - 107 mmol/L    Bicarbonate 19 (L) 21 - 32 mmol/L    Anion Gap 14 10 - 20 mmol/L    Urea Nitrogen 81 (H) 6 - 23 mg/dL    Creatinine 4.57 (H) 0.50 - 1.30 mg/dL    eGFR 12 (L) >60 mL/min/1.73m*2    Calcium 9.4 8.6 - 10.3 mg/dL    Phosphorus 4.7 2.5 - 4.9 mg/dL    Albumin 3.6 3.4 - 5.0 g/dL   POCT GLUCOSE   Result Value Ref Range    POCT Glucose 190 (H) 74 - 99 mg/dL                                     Assessment/Plan   Assessment & Plan  Pneumonia of right upper lobe due to infectious organism    Acute pulmonary edema    Acute on chronic hypoxic respiratory failure (Multi)    Interstitial lung disease (Multi)    Acute metabolic encephalopathy    Summary:  79 y.o. male admitted on 11/5/2024  8:13 PM for worsening dyspnea, weakness and sputum production. Lab work is negative for infectious process, however, he does have some increased opacification on his chest x-ray which may be suggestive for community-acquired pneumonia.  He remains stable on 5 L/min O2 supplementation.  This could be a combination of CAP vs ILD flare. Breathing has improved after lasix on 11/7/2024. He remains encephalopathic.      Recommendations:  -Recommend total 5-7 days antibiotics for possible CAP. Check urine Strep and Legionella Ag.  -Continue IV steroid taper as ordered.  -O2 supplementation to keep SpO2 90-92%. Currently increased to 5L/min  -Bronchopulmonary hygiene  -Bronchodilators  -DNR and DNI  -will follow up PRN over the weekend         Pallavi Mcnally DO  Pulmonary & Critical Care  11/9/2024 9:20 PM

## 2024-11-09 NOTE — PROGRESS NOTES
"Antonio Willis is a 79 y.o. male on day 4 of admission presenting with Pneumonia of right upper lobe due to infectious organism.    Subjective    Seen and examined,  He is still hallucinating.  But he appears more comfortable than yesterday.  He is DO NOT RESUSCITATE DNI.       Objective     Physical Exam  Awake comfortable.  HEENT unremarkable.  Neck no JVD.  Heart S1-S2.  Lungs reduced air entry.  Abdomen is soft nontender.  Legs no edema  Last Recorded Vitals  Blood pressure 139/86, pulse 63, temperature (!) 30.4 °C (86.7 °F), temperature source Temporal, resp. rate 16, height 1.651 m (5' 5\"), weight 76.7 kg (169 lb), SpO2 94%.  Intake/Output last 3 Shifts:  I/O last 3 completed shifts:  In: 614.2 (8 mL/kg) [I.V.:564.2 (7.4 mL/kg); IV Piggyback:50]  Out: 650 (8.5 mL/kg) [Urine:650 (0.2 mL/kg/hr)]  Weight: 76.7 kg     Relevant Results  Results for orders placed or performed during the hospital encounter of 11/05/24 (from the past 96 hours)   Sars-CoV-2 PCR   Result Value Ref Range    Coronavirus 2019, PCR Not Detected Not Detected   Influenza A, and B PCR   Result Value Ref Range    Flu A Result Not Detected Not Detected    Flu B Result Not Detected Not Detected   CBC and Auto Differential   Result Value Ref Range    WBC 8.8 4.4 - 11.3 x10*3/uL    nRBC 0.0 0.0 - 0.0 /100 WBCs    RBC 3.62 (L) 4.50 - 5.90 x10*6/uL    Hemoglobin 11.8 (L) 13.5 - 17.5 g/dL    Hematocrit 37.8 (L) 41.0 - 52.0 %     (H) 80 - 100 fL    MCH 32.6 26.0 - 34.0 pg    MCHC 31.2 (L) 32.0 - 36.0 g/dL    RDW 18.9 (H) 11.5 - 14.5 %    Platelets 117 (L) 150 - 450 x10*3/uL    Neutrophils % 77.4 40.0 - 80.0 %    Immature Granulocytes %, Automated 0.7 0.0 - 0.9 %    Lymphocytes % 11.7 13.0 - 44.0 %    Monocytes % 6.2 2.0 - 10.0 %    Eosinophils % 3.7 0.0 - 6.0 %    Basophils % 0.3 0.0 - 2.0 %    Neutrophils Absolute 6.78 (H) 1.60 - 5.50 x10*3/uL    Immature Granulocytes Absolute, Automated 0.06 0.00 - 0.50 x10*3/uL    Lymphocytes Absolute 1.02 " 0.80 - 3.00 x10*3/uL    Monocytes Absolute 0.54 0.05 - 0.80 x10*3/uL    Eosinophils Absolute 0.32 0.00 - 0.40 x10*3/uL    Basophils Absolute 0.03 0.00 - 0.10 x10*3/uL   Comprehensive metabolic panel   Result Value Ref Range    Glucose 56 (L) 74 - 99 mg/dL    Sodium 148 (H) 136 - 145 mmol/L    Potassium 5.6 (H) 3.5 - 5.3 mmol/L    Chloride 122 (H) 98 - 107 mmol/L    Bicarbonate 19 (L) 21 - 32 mmol/L    Anion Gap 13 10 - 20 mmol/L    Urea Nitrogen 87 (H) 6 - 23 mg/dL    Creatinine 4.98 (H) 0.50 - 1.30 mg/dL    eGFR 11 (L) >60 mL/min/1.73m*2    Calcium 9.9 8.6 - 10.3 mg/dL    Albumin 3.7 3.4 - 5.0 g/dL    Alkaline Phosphatase 293 (H) 33 - 136 U/L    Total Protein 7.6 6.4 - 8.2 g/dL     (H) 9 - 39 U/L    Bilirubin, Total 0.6 0.0 - 1.2 mg/dL     (H) 10 - 52 U/L   Troponin I, High Sensitivity   Result Value Ref Range    Troponin I, High Sensitivity 17 0 - 20 ng/L   B-Type Natriuretic Peptide   Result Value Ref Range     (H) 0 - 99 pg/mL   ECG 12 lead   Result Value Ref Range    Ventricular Rate 64 BPM    Atrial Rate 64 BPM    DC Interval 212 ms    QRS Duration 160 ms    QT Interval 442 ms    QTC Calculation(Bazett) 455 ms    P Axis 48 degrees    R Axis 253 degrees    T Axis 46 degrees    QRS Count 11 beats    Q Onset 184 ms    P Onset 86 ms    P Offset 135 ms    T Offset 405 ms    QTC Fredericia 451 ms   Basic metabolic panel   Result Value Ref Range    Glucose 137 (H) 74 - 99 mg/dL    Sodium 150 (H) 136 - 145 mmol/L    Potassium 4.3 3.5 - 5.3 mmol/L    Chloride 126 (H) 98 - 107 mmol/L    Bicarbonate 17 (L) 21 - 32 mmol/L    Anion Gap 11 10 - 20 mmol/L    Urea Nitrogen 90 (H) 6 - 23 mg/dL    Creatinine 4.72 (H) 0.50 - 1.30 mg/dL    eGFR 12 (L) >60 mL/min/1.73m*2    Calcium 9.3 8.6 - 10.3 mg/dL   Lavender Top   Result Value Ref Range    Extra Tube Hold for add-ons.    Basic metabolic panel   Result Value Ref Range    Glucose 125 (H) 74 - 99 mg/dL    Sodium 149 (H) 136 - 145 mmol/L    Potassium 4.6 3.5  - 5.3 mmol/L    Chloride 124 (H) 98 - 107 mmol/L    Bicarbonate 16 (L) 21 - 32 mmol/L    Anion Gap 14 10 - 20 mmol/L    Urea Nitrogen 81 (H) 6 - 23 mg/dL    Creatinine 4.70 (H) 0.50 - 1.30 mg/dL    eGFR 12 (L) >60 mL/min/1.73m*2    Calcium 9.2 8.6 - 10.3 mg/dL   Basic metabolic panel   Result Value Ref Range    Glucose 154 (H) 74 - 99 mg/dL    Sodium 149 (H) 136 - 145 mmol/L    Potassium 5.3 3.5 - 5.3 mmol/L    Chloride 126 (H) 98 - 107 mmol/L    Bicarbonate 15 (L) 21 - 32 mmol/L    Anion Gap 13 10 - 20 mmol/L    Urea Nitrogen 78 (H) 6 - 23 mg/dL    Creatinine 4.59 (H) 0.50 - 1.30 mg/dL    eGFR 12 (L) >60 mL/min/1.73m*2    Calcium 9.2 8.6 - 10.3 mg/dL   CBC and Auto Differential   Result Value Ref Range    WBC 7.3 4.4 - 11.3 x10*3/uL    nRBC 0.0 0.0 - 0.0 /100 WBCs    RBC 3.16 (L) 4.50 - 5.90 x10*6/uL    Hemoglobin 10.3 (L) 13.5 - 17.5 g/dL    Hematocrit 33.2 (L) 41.0 - 52.0 %     (H) 80 - 100 fL    MCH 32.6 26.0 - 34.0 pg    MCHC 31.0 (L) 32.0 - 36.0 g/dL    RDW 19.2 (H) 11.5 - 14.5 %    Platelets 94 (L) 150 - 450 x10*3/uL    Neutrophils % 94.1 40.0 - 80.0 %    Immature Granulocytes %, Automated 0.7 0.0 - 0.9 %    Lymphocytes % 4.7 13.0 - 44.0 %    Monocytes % 0.4 2.0 - 10.0 %    Eosinophils % 0.0 0.0 - 6.0 %    Basophils % 0.1 0.0 - 2.0 %    Neutrophils Absolute 6.83 (H) 1.60 - 5.50 x10*3/uL    Immature Granulocytes Absolute, Automated 0.05 0.00 - 0.50 x10*3/uL    Lymphocytes Absolute 0.34 (L) 0.80 - 3.00 x10*3/uL    Monocytes Absolute 0.03 (L) 0.05 - 0.80 x10*3/uL    Eosinophils Absolute 0.00 0.00 - 0.40 x10*3/uL    Basophils Absolute 0.01 0.00 - 0.10 x10*3/uL   Blood Culture    Specimen: Peripheral Venipuncture; Blood culture   Result Value Ref Range    Blood Culture No growth at 1 day    Lactate   Result Value Ref Range    Lactate 1.6 0.4 - 2.0 mmol/L   CBC and Auto Differential   Result Value Ref Range    WBC 15.2 (H) 4.4 - 11.3 x10*3/uL    nRBC 0.0 0.0 - 0.0 /100 WBCs    RBC 3.18 (L) 4.50 - 5.90  x10*6/uL    Hemoglobin 10.4 (L) 13.5 - 17.5 g/dL    Hematocrit 33.3 (L) 41.0 - 52.0 %     (H) 80 - 100 fL    MCH 32.7 26.0 - 34.0 pg    MCHC 31.2 (L) 32.0 - 36.0 g/dL    RDW 19.4 (H) 11.5 - 14.5 %    Platelets 104 (L) 150 - 450 x10*3/uL    Neutrophils % 87.6 40.0 - 80.0 %    Immature Granulocytes %, Automated 1.6 (H) 0.0 - 0.9 %    Lymphocytes % 4.7 13.0 - 44.0 %    Monocytes % 5.9 2.0 - 10.0 %    Eosinophils % 0.1 0.0 - 6.0 %    Basophils % 0.1 0.0 - 2.0 %    Neutrophils Absolute 13.30 (H) 1.60 - 5.50 x10*3/uL    Immature Granulocytes Absolute, Automated 0.24 0.00 - 0.50 x10*3/uL    Lymphocytes Absolute 0.72 (L) 0.80 - 3.00 x10*3/uL    Monocytes Absolute 0.89 (H) 0.05 - 0.80 x10*3/uL    Eosinophils Absolute 0.01 0.00 - 0.40 x10*3/uL    Basophils Absolute 0.02 0.00 - 0.10 x10*3/uL   Comprehensive metabolic panel   Result Value Ref Range    Glucose 111 (H) 74 - 99 mg/dL    Sodium 151 (H) 136 - 145 mmol/L    Potassium 4.9 3.5 - 5.3 mmol/L    Chloride 123 (H) 98 - 107 mmol/L    Bicarbonate 18 (L) 21 - 32 mmol/L    Anion Gap 15 10 - 20 mmol/L    Urea Nitrogen 84 (H) 6 - 23 mg/dL    Creatinine 4.72 (H) 0.50 - 1.30 mg/dL    eGFR 12 (L) >60 mL/min/1.73m*2    Calcium 9.2 8.6 - 10.3 mg/dL    Albumin 3.5 3.4 - 5.0 g/dL    Alkaline Phosphatase 228 (H) 33 - 136 U/L    Total Protein 6.6 6.4 - 8.2 g/dL    AST 29 9 - 39 U/L    Bilirubin, Total 0.5 0.0 - 1.2 mg/dL    ALT 97 (H) 10 - 52 U/L        Medications:  cholecalciferol, 1,000 Units, oral, Daily  famotidine, 10 mg, oral, q48h  heparin (porcine), 5,000 Units, subcutaneous, q8h PAULETTE  ipratropium-albuteroL, 3 mL, nebulization, TID  melatonin, 4.5 mg, oral, q PM  methylPREDNISolone sodium succinate (PF), 20 mg, intravenous, Daily   Followed by  [START ON 11/11/2024] methylPREDNISolone sodium succinate (PF), 10 mg, intravenous, Daily  OLANZapine, 2.5 mg, oral, Nightly  piperacillin-tazobactam, 2.25 g, intravenous, q6h  polyethylene glycol, 17 g, oral, Daily  rosuvastatin, 10  mg, oral, Nightly  sodium bicarbonate, 650 mg, oral, BID  tamsulosin, 0.4 mg, oral, Nightly      PRN medications: acetaminophen, ipratropium-albuteroL, OLANZapine, ondansetron **OR** ondansetron    CT abdomen pelvis wo IV contrast    Result Date: 11/6/2024  Interpreted By:  Chase Rivera, STUDY: CT ABDOMEN PELVIS WO IV CONTRAST;  11/6/2024 2:59 pm   INDICATION: Signs/Symptoms:nephrolithiasis and history ogf Prostate carcinoma.     COMPARISON: 07/26/2023   ACCESSION NUMBER(S): [id number]   ORDERING CLINICIAN: YUDELKA HANKINS   TECHNIQUE: CT of the abdomen and pelvis was performed. Contiguous axial images were obtained at 3 mm slice thickness through the abdomen and pelvis. Coronal and sagittal reconstructions at 3 mm slice thickness were performed.  No intravenous contrast was administered.   FINDINGS: Please note that the evaluation of vessels, lymph nodes and organs is limited without intravenous contrast.   LOWER CHEST: The included lung bases demonstrate bibasilar bronchiectasis. Bibasilar infiltrates and interstitial thickening. Findings have progressed slightly since the prior exam. Cardiac pacer.   ABDOMEN:   LIVER: Grossly unremarkable. No definite focal liver lesions.   BILE DUCTS: No bile duct dilatation.   GALLBLADDER: Suboptimally distended and evaluated. No definite gallstones.   PANCREAS: Unremarkable pancreas.   SPLEEN: Unremarkable spleen.   ADRENAL GLANDS: Grossly unremarkable.   KIDNEYS AND URETERS: Cortical thinning of the left kidney with severe left-sided hydronephrosis and hydroureter. Nonobstructing left renal stones. There is a tiny stone within the dependent portion of the dilated distal left ureter measuring approximately 4.5 mm. Also another punctate 2 mm stone near the left UVJ. The larger previously noted distal left ureteral stone is no longer seen.   PELVIS:   BLADDER: Grossly unremarkable.   REPRODUCTIVE ORGANS: Calcifications within the prostate.       ABDOMINAL WALL: Fat containing  left inguinal hernia.   BONES: Discogenic degenerative changes. Scoliosis.   No ascites.No retroperitoneal adenopathy.       1.  Persistent severe left-sided hydronephrosis and hydroureter with cortical thinning of the left kidney. However previously noted larger distal left ureteral stone has resolved with residual much smaller 4 mm stone in the distal left ureter as described. 2. Worsening bibasilar bronchiectasis and interstitial thickening and ground-glass infiltrates.     MACRO: None   Signed by: Chase Rivera 11/6/2024 4:17 PM Dictation workstation:   [id number]    ECG 12 lead    Result Date: 11/6/2024  Atrial-paced rhythm with prolonged AV conduction Right bundle branch block Abnormal ECG When compared with ECG of 02-OCT-2024 10:58, (unconfirmed) No significant change was found    XR chest 2 views    Result Date: 11/5/2024  Interpreted By:  Pepe Scruggs, STUDY: XR CHEST 2 VIEWS;  11/5/2024 7:00 pm   INDICATION: Signs/Symptoms:cough.     COMPARISON: Radiographs of the chest dated 08/25/2024.   ACCESSION NUMBER(S): [id number]   ORDERING CLINICIAN: GEORGE LOPEZ   FINDINGS: PA and lateral radiographs of the chest were provided.   Dual lead left subclavian pacemaker is in place.   CARDIOMEDIASTINAL SILHOUETTE: Cardiomediastinal silhouette is mildly enlarged, similar in appearance to prior exam.   LUNGS: There is redemonstration of the interstitial prominence in the lungs bilaterally, likely representing component of chronic interstitial lung disease, with some increase in airspace opacities in the right upper lung compared to prior study on 08/24/2024. No evidence of pneumothorax or sizable pleural effusion.   ABDOMEN: No remarkable upper abdominal findings.   BONES: No acute osseous changes.       1.  Somewhat increased interstitial markings in the right upper lung compared to prior exam on 08/25/2024, possibly representing developing airspace infiltrate. 2. Prominence of the interstitial  markings in the lungs bilaterally, likely representing component of chronic interstitial lung disease.   MACRO: None   Signed by: Pepe Scruggs 11/5/2024 7:12 PM Dictation workstation:   [id number]      Assessment/Plan   Principal Problem:    Pneumonia of right upper lobe due to infectious organism  Active Problems:    Acute pulmonary edema    Acute on chronic hypoxic respiratory failure (Multi)    Interstitial lung disease (Multi)    Acute metabolic encephalopathy    79-year-old  male, who is known to have a history of multiple sclerosis, with no deficits, history of permanent pacemaker, hypertension, COVID infection about 2 months ago, since then he had been on oxygen, increasing shortness of breath, smoker, history of prostate cancer, history of chronic hydronephrosis of the left kidney, BPH, nephrolithiasis, and he came to the emergency department, because of increasing shortness of breath,.  Acute on chronic respiratory failure, chronic lung disease, multifactorial, could be a pneumonia underlying interstitial lung disease, post-COVID lung disease, also recent pulmonary hypertension with right heart failure and cor pulmonale, he was given IV Lasix, but he has problem with swallowing, he was evaluated by the speech therapist, essentially he needed to be n.p.o., but the family wife refused the [place], seen by the palliative care, we are giving pleasure diet, he is DO NOT RESUSCITATE DO NOT INTUBATE, also hospice discussion is ongoing, his sodium is high, as a result renal has started on D5 water, his oxygen saturation is better now   acute delirium, and metabolic encephalopathy secondary to above.  Recent COVID,.  CKD 4, will monitor closely, he does have chronic CKD, history of prostate cancer,.  History of prostate cancer, history of radiation for the prostate and BPH and nephrolithiasis, and chronic hydronephrosis.  Hyperlipidemia same     Again he is DO NOT RESUSCITATE, on pressure diet,  IV fluids, supportive care,  I spent 25 minutes in the professional and overall care of this patient.    Chicho Avina MD

## 2024-11-09 NOTE — PROGRESS NOTES
Nephrology Consult Progress Note    Admit Date: 11/5/2024    Interval history:  He is more awake, no SOB    CURRENT MEDICATIONS:    Current Facility-Administered Medications:     acetaminophen (Tylenol) tablet 650 mg, 650 mg, oral, q6h PRN, Rome Kerns MD    cholecalciferol (Vitamin D-3) tablet 1,000 Units, 1,000 Units, oral, Daily, Rome Kerns MD, 1,000 Units at 11/09/24 0949    dextrose 5% infusion, 50 mL/hr, intravenous, Continuous, Theo Davis MD, Last Rate: 50 mL/hr at 11/08/24 2349, 50 mL/hr at 11/08/24 2349    dextrose 5% infusion, 50 mL/hr, intravenous, Continuous, Theo Davis MD    famotidine (Pepcid) tablet 10 mg, 10 mg, oral, q48h, Rome Kerns MD, 10 mg at 11/07/24 2021    heparin (porcine) injection 5,000 Units, 5,000 Units, subcutaneous, q8h PAULETTE, Rome Kerns MD, 5,000 Units at 11/09/24 0547    ipratropium-albuteroL (Duo-Neb) 0.5-2.5 mg/3 mL nebulizer solution 3 mL, 3 mL, nebulization, q2h PRN, Rome Kerns MD    ipratropium-albuteroL (Duo-Neb) 0.5-2.5 mg/3 mL nebulizer solution 3 mL, 3 mL, nebulization, TID, Rome Kerns MD, 3 mL at 11/09/24 0729    melatonin tablet 4.5 mg, 4.5 mg, oral, q PM, Marek Chao MD, 4.5 mg at 11/08/24 2022    methylPREDNISolone sod succinate (SOLU-Medrol) 40 mg/mL injection 20 mg, 20 mg, intravenous, Daily, 20 mg at 11/09/24 0949 **FOLLOWED BY** [START ON 11/11/2024] methylPREDNISolone sod succinate (SOLU-Medrol) 40 mg/mL injection 10 mg, 10 mg, intravenous, Daily, Pallavi Mcnally DO    OLANZapine (ZyPREXA) injection 5 mg, 5 mg, intramuscular, q30 min PRN, John Garcia MD, 5 mg at 11/09/24 0640    OLANZapine (ZyPREXA) tablet 2.5 mg, 2.5 mg, oral, Nightly, Marek Chao MD, 2.5 mg at 11/08/24 2023    ondansetron (Zofran) tablet 4 mg, 4 mg, oral, q8h PRN **OR** ondansetron (Zofran) injection 4 mg, 4 mg, intravenous, q8h PRN, Rome Kerns MD    oxygen (O2) therapy, 2 L/min, inhalation, Continuous, Jelani Sneed, APRN-CNP, Last  "Rate: 300,000 mL/hr at 11/08/24 0946, 5 L/min at 11/08/24 0946    piperacillin-tazobactam (Zosyn) 2.25 g in dextrose (iso) IV 50 mL, 2.25 g, intravenous, q6h, Chicho Avina MD, Stopped at 11/09/24 1016    polyethylene glycol (Glycolax, Miralax) packet 17 g, 17 g, oral, Daily, Rome Kerns MD, 17 g at 11/07/24 0901    rosuvastatin (Crestor) tablet 10 mg, 10 mg, oral, Nightly, Rome Kerns MD, 10 mg at 11/08/24 2023    sodium bicarbonate tablet 650 mg, 650 mg, oral, BID, Rome Kerns MD, 650 mg at 11/09/24 0949    tamsulosin (Flomax) 24 hr capsule 0.4 mg, 0.4 mg, oral, Nightly, Rome Kerns MD, 0.4 mg at 11/08/24 2023       Intake/Output Summary (Last 24 hours) at 11/9/2024 1145  Last data filed at 11/9/2024 0735  Gross per 24 hour   Intake 614.17 ml   Output 700 ml   Net -85.83 ml       PHYSICAL EXAM:  /86 (BP Location: Right arm, Patient Position: Lying)   Pulse 63   Temp 35.9 °C (96.7 °F) (Temporal)   Resp 16   Ht 1.651 m (5' 5\")   Wt 76.7 kg (169 lb)   SpO2 94%   BMI 28.12 kg/m²     Intake/Output Summary (Last 24 hours) at 11/9/2024 1145  Last data filed at 11/9/2024 0735  Gross per 24 hour   Intake 614.17 ml   Output 700 ml   Net -85.83 ml     Gen: sleepy, awake, NAD  Neck: No JVD  Cardiac: RRR  Resp: decrease BS  Abd: Soft, non tender, +BS, non distended   Ext: No edema   Neuro: moves 4 ext  Peripheral Pulses: weak peripheral pulses.  Skin: Skin color, texture, turgor normal, no suspicious rashes or lesions.    Labs:  Results for orders placed or performed during the hospital encounter of 11/05/24 (from the past 24 hours)   Renal function panel   Result Value Ref Range    Glucose 130 (H) 74 - 99 mg/dL    Sodium 148 (H) 136 - 145 mmol/L    Potassium 5.0 3.5 - 5.3 mmol/L    Chloride 120 (H) 98 - 107 mmol/L    Bicarbonate 19 (L) 21 - 32 mmol/L    Anion Gap 14 10 - 20 mmol/L    Urea Nitrogen 81 (H) 6 - 23 mg/dL    Creatinine 4.57 (H) 0.50 - 1.30 mg/dL    eGFR 12 (L) >60 " mL/min/1.73m*2    Calcium 9.4 8.6 - 10.3 mg/dL    Phosphorus 4.7 2.5 - 4.9 mg/dL    Albumin 3.6 3.4 - 5.0 g/dL        DATA:   Diagnostic tests reviewed for today's visit:    New labs and imaging   RFP, CBC and lactate seen    Assessment and Plan:  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 Hypotension, weakness, found to have worsening infiltrates more consistent with PNA, on zosyn, also had iv lasix  - CHRISTIAN on CKD stage 4/5 (baseline Scr 4.5-4.9): Scr remains at baseline  BP: controlled  Hypernatremia: due to dehydration, better Na down to 148, had gentle D5W drip, off now   AGMA, mild from CKD, stable   Hb 10.4     PLAN:  - gentle D5W drip 50 ml/h for another day, supportive care, no need of dialysis     Will continue to follow.   patient at-risk for clinically significant deterioration      Signature: Theo Davis MD

## 2024-11-09 NOTE — CARE PLAN
Problem: Pain - Adult  Goal: Verbalizes/displays adequate comfort level or baseline comfort level  Outcome: Progressing     Problem: Safety - Adult  Goal: Free from fall injury  Outcome: Progressing     Problem: Discharge Planning  Goal: Discharge to home or other facility with appropriate resources  Outcome: Progressing     Problem: Chronic Conditions and Co-morbidities  Goal: Patient's chronic conditions and co-morbidity symptoms are monitored and maintained or improved  Outcome: Progressing     Problem: Skin  Goal: Decreased wound size/increased tissue granulation at next dressing change  11/8/2024 2332 by Candi Li RN  Flowsheets (Taken 11/8/2024 2332)  Decreased wound size/increased tissue granulation at next dressing change: Promote sleep for wound healing  11/8/2024 2317 by Candi Li RN  Outcome: Progressing  Goal: Participates in plan/prevention/treatment measures  Outcome: Progressing  Goal: Prevent/manage excess moisture  Outcome: Progressing  Goal: Prevent/minimize sheer/friction injuries  Outcome: Progressing  Goal: Promote/optimize nutrition  Outcome: Progressing  Goal: Promote skin healing  Outcome: Progressing

## 2024-11-09 NOTE — NURSING NOTE
Patient was restless intermittently throughout the shift, nurses repositioned in bed and patient was more comfortable. MD aware of more audible wheezing and rhonchi after eating lunch. Per MD patient is DNR DNI and no new interventions needed. Nurse asked if MD wants to order any medications for secretions management or CXR, no new orders per MD.  RT also aware and nursing asked RT to come assess patient.   Patient was up in chair with assist of 3 staff, gait belt and walker from mid morning until after lunch. Patient tolerated well. No signs of pain noted throughout shift.   Nurse washed hair, bathed patient, changed shirt pants and linens.   Nurse educated family that patient is puree thickened liquids diet and pleasure foods as tolerated per palliative care. Family fed patient brownie and told nurse that patient did not cough but held food in mouth and needed to be reminded several times to swallow. Nurse educated family that patient might silently aspirate and has PNA so have to balance pleasure feeding with making patient uncomfortable with difficulty breathing from aspirating. Family was very adamant to try to feed patient most of lunch tray, but agreed to only give bites of dinner if patient awake and asking to eat.   At this time, family is visiting with patient. Patient is asleep in bed, breathing is regular, continues to have audible rhonchi and needing supplemental oxygen as charted by RT. Bed alarm is on. Call light in reach of family and patient. Door is open.

## 2024-11-09 NOTE — CARE PLAN
The patient's goals for the shift include sleep    The clinical goals for the shift include patient will remain safe this shift      Problem: Pain - Adult  Goal: Verbalizes/displays adequate comfort level or baseline comfort level  Outcome: Progressing     Problem: Safety - Adult  Goal: Free from fall injury  Outcome: Progressing     Problem: Skin  Goal: Decreased wound size/increased tissue granulation at next dressing change  Outcome: Progressing  Goal: Participates in plan/prevention/treatment measures  Outcome: Progressing  Goal: Prevent/manage excess moisture  Outcome: Progressing  Goal: Prevent/minimize sheer/friction injuries  Outcome: Progressing  Goal: Promote/optimize nutrition  Outcome: Progressing  Goal: Promote skin healing  Outcome: Progressing     Problem: Fall/Injury  Goal: Not fall by end of shift  Outcome: Progressing  Goal: Be free from injury by end of the shift  Outcome: Progressing  Goal: Verbalize understanding of personal risk factors for fall in the hospital  Outcome: Progressing  Goal: Verbalize understanding of risk factor reduction measures to prevent injury from fall in the home  Outcome: Progressing  Goal: Use assistive devices by end of the shift  Outcome: Progressing  Goal: Pace activities to prevent fatigue by end of the shift  Outcome: Progressing

## 2024-11-10 VITALS
BODY MASS INDEX: 28.16 KG/M2 | HEIGHT: 65 IN | RESPIRATION RATE: 16 BRPM | OXYGEN SATURATION: 95 % | TEMPERATURE: 86 F | DIASTOLIC BLOOD PRESSURE: 64 MMHG | WEIGHT: 169 LBS | SYSTOLIC BLOOD PRESSURE: 100 MMHG | HEART RATE: 64 BPM

## 2024-11-10 LAB
ALBUMIN SERPL BCP-MCNC: 3.3 G/DL (ref 3.4–5)
ANION GAP SERPL CALC-SCNC: 14 MMOL/L (ref 10–20)
BACTERIA BLD CULT: NORMAL
BUN SERPL-MCNC: 76 MG/DL (ref 6–23)
CALCIUM SERPL-MCNC: 8.8 MG/DL (ref 8.6–10.3)
CHLORIDE SERPL-SCNC: 120 MMOL/L (ref 98–107)
CO2 SERPL-SCNC: 17 MMOL/L (ref 21–32)
CREAT SERPL-MCNC: 4.13 MG/DL (ref 0.5–1.3)
EGFRCR SERPLBLD CKD-EPI 2021: 14 ML/MIN/1.73M*2
ERYTHROCYTE [DISTWIDTH] IN BLOOD BY AUTOMATED COUNT: 19 % (ref 11.5–14.5)
GLUCOSE BLD MANUAL STRIP-MCNC: 116 MG/DL (ref 74–99)
GLUCOSE BLD MANUAL STRIP-MCNC: 157 MG/DL (ref 74–99)
GLUCOSE BLD MANUAL STRIP-MCNC: 275 MG/DL (ref 74–99)
GLUCOSE BLD MANUAL STRIP-MCNC: 75 MG/DL (ref 74–99)
GLUCOSE SERPL-MCNC: 192 MG/DL (ref 74–99)
HCT VFR BLD AUTO: 35.7 % (ref 41–52)
HGB BLD-MCNC: 11 G/DL (ref 13.5–17.5)
MCH RBC QN AUTO: 32.9 PG (ref 26–34)
MCHC RBC AUTO-ENTMCNC: 30.8 G/DL (ref 32–36)
MCV RBC AUTO: 107 FL (ref 80–100)
NRBC BLD-RTO: 0 /100 WBCS (ref 0–0)
PHOSPHATE SERPL-MCNC: 4.6 MG/DL (ref 2.5–4.9)
PLATELET # BLD AUTO: 93 X10*3/UL (ref 150–450)
POTASSIUM SERPL-SCNC: 5 MMOL/L (ref 3.5–5.3)
RBC # BLD AUTO: 3.34 X10*6/UL (ref 4.5–5.9)
SODIUM SERPL-SCNC: 146 MMOL/L (ref 136–145)
WBC # BLD AUTO: 12.4 X10*3/UL (ref 4.4–11.3)

## 2024-11-10 PROCEDURE — 1100000001 HC PRIVATE ROOM DAILY

## 2024-11-10 PROCEDURE — 94640 AIRWAY INHALATION TREATMENT: CPT

## 2024-11-10 PROCEDURE — 2500000001 HC RX 250 WO HCPCS SELF ADMINISTERED DRUGS (ALT 637 FOR MEDICARE OP): Performed by: INTERNAL MEDICINE

## 2024-11-10 PROCEDURE — 9420000001 HC RT PATIENT EDUCATION 5 MIN

## 2024-11-10 PROCEDURE — 85027 COMPLETE CBC AUTOMATED: CPT | Performed by: INTERNAL MEDICINE

## 2024-11-10 PROCEDURE — 36415 COLL VENOUS BLD VENIPUNCTURE: CPT | Performed by: INTERNAL MEDICINE

## 2024-11-10 PROCEDURE — 2500000002 HC RX 250 W HCPCS SELF ADMINISTERED DRUGS (ALT 637 FOR MEDICARE OP, ALT 636 FOR OP/ED): Performed by: INTERNAL MEDICINE

## 2024-11-10 PROCEDURE — 2500000005 HC RX 250 GENERAL PHARMACY W/O HCPCS: Performed by: INTERNAL MEDICINE

## 2024-11-10 PROCEDURE — 2500000004 HC RX 250 GENERAL PHARMACY W/ HCPCS (ALT 636 FOR OP/ED): Performed by: INTERNAL MEDICINE

## 2024-11-10 PROCEDURE — 82947 ASSAY GLUCOSE BLOOD QUANT: CPT

## 2024-11-10 PROCEDURE — 99233 SBSQ HOSP IP/OBS HIGH 50: CPT | Performed by: INTERNAL MEDICINE

## 2024-11-10 PROCEDURE — 80069 RENAL FUNCTION PANEL: CPT | Performed by: INTERNAL MEDICINE

## 2024-11-10 PROCEDURE — 94668 MNPJ CHEST WALL SBSQ: CPT

## 2024-11-10 RX ORDER — SODIUM BICARBONATE 650 MG/1
650 TABLET ORAL 3 TIMES DAILY
Status: DISCONTINUED | OUTPATIENT
Start: 2024-11-10 | End: 2024-11-13 | Stop reason: HOSPADM

## 2024-11-10 RX ADMIN — Medication 4 L/MIN: at 09:03

## 2024-11-10 RX ADMIN — PIPERACILLIN SODIUM AND TAZOBACTAM SODIUM 2.25 G: 2; .25 INJECTION, SOLUTION INTRAVENOUS at 08:56

## 2024-11-10 RX ADMIN — PIPERACILLIN SODIUM AND TAZOBACTAM SODIUM 2.25 G: 2; .25 INJECTION, SOLUTION INTRAVENOUS at 22:13

## 2024-11-10 RX ADMIN — HEPARIN SODIUM 5000 UNITS: 5000 INJECTION, SOLUTION INTRAVENOUS; SUBCUTANEOUS at 22:13

## 2024-11-10 RX ADMIN — Medication 1000 UNITS: at 08:56

## 2024-11-10 RX ADMIN — SODIUM BICARBONATE 650 MG: 650 TABLET ORAL at 08:56

## 2024-11-10 RX ADMIN — TAMSULOSIN HYDROCHLORIDE 0.4 MG: 0.4 CAPSULE ORAL at 22:15

## 2024-11-10 RX ADMIN — METHYLPREDNISOLONE SODIUM SUCCINATE 20 MG: 40 INJECTION, POWDER, FOR SOLUTION INTRAMUSCULAR; INTRAVENOUS at 08:56

## 2024-11-10 RX ADMIN — IPRATROPIUM BROMIDE AND ALBUTEROL SULFATE 3 ML: 2.5; .5 SOLUTION RESPIRATORY (INHALATION) at 19:00

## 2024-11-10 RX ADMIN — OLANZAPINE 2.5 MG: 5 TABLET, FILM COATED ORAL at 22:15

## 2024-11-10 RX ADMIN — Medication 4.5 MG: at 22:14

## 2024-11-10 RX ADMIN — IPRATROPIUM BROMIDE AND ALBUTEROL SULFATE 3 ML: 2.5; .5 SOLUTION RESPIRATORY (INHALATION) at 10:44

## 2024-11-10 RX ADMIN — SODIUM BICARBONATE 650 MG: 650 TABLET ORAL at 12:55

## 2024-11-10 RX ADMIN — SODIUM BICARBONATE 650 MG: 650 TABLET ORAL at 17:05

## 2024-11-10 RX ADMIN — ROSUVASTATIN CALCIUM 10 MG: 10 TABLET, FILM COATED ORAL at 22:14

## 2024-11-10 RX ADMIN — Medication 5 L/MIN: at 19:01

## 2024-11-10 RX ADMIN — SODIUM BICARBONATE 650 MG: 650 TABLET ORAL at 22:15

## 2024-11-10 RX ADMIN — HEPARIN SODIUM 5000 UNITS: 5000 INJECTION, SOLUTION INTRAVENOUS; SUBCUTANEOUS at 05:15

## 2024-11-10 RX ADMIN — POLYETHYLENE GLYCOL 3350 17 G: 17 POWDER, FOR SOLUTION ORAL at 08:56

## 2024-11-10 RX ADMIN — PIPERACILLIN SODIUM AND TAZOBACTAM SODIUM 2.25 G: 2; .25 INJECTION, SOLUTION INTRAVENOUS at 14:48

## 2024-11-10 RX ADMIN — PIPERACILLIN SODIUM AND TAZOBACTAM SODIUM 2.25 G: 2; .25 INJECTION, SOLUTION INTRAVENOUS at 03:06

## 2024-11-10 RX ADMIN — HEPARIN SODIUM 5000 UNITS: 5000 INJECTION, SOLUTION INTRAVENOUS; SUBCUTANEOUS at 14:48

## 2024-11-10 ASSESSMENT — COGNITIVE AND FUNCTIONAL STATUS - GENERAL
DRESSING REGULAR UPPER BODY CLOTHING: A LOT
EATING MEALS: A LOT
HELP NEEDED FOR BATHING: A LOT
HELP NEEDED FOR BATHING: A LOT
MOVING FROM LYING ON BACK TO SITTING ON SIDE OF FLAT BED WITH BEDRAILS: A LITTLE
WALKING IN HOSPITAL ROOM: A LOT
MOBILITY SCORE: 12
DRESSING REGULAR LOWER BODY CLOTHING: A LOT
CLIMB 3 TO 5 STEPS WITH RAILING: TOTAL
DAILY ACTIVITIY SCORE: 12
DRESSING REGULAR LOWER BODY CLOTHING: A LOT
STANDING UP FROM CHAIR USING ARMS: A LOT
STANDING UP FROM CHAIR USING ARMS: A LOT
CLIMB 3 TO 5 STEPS WITH RAILING: TOTAL
TOILETING: A LOT
DRESSING REGULAR UPPER BODY CLOTHING: A LOT
MOVING TO AND FROM BED TO CHAIR: A LOT
TOILETING: A LOT
TURNING FROM BACK TO SIDE WHILE IN FLAT BAD: A LOT
PERSONAL GROOMING: A LOT
WALKING IN HOSPITAL ROOM: A LOT
MOVING TO AND FROM BED TO CHAIR: A LOT
TURNING FROM BACK TO SIDE WHILE IN FLAT BAD: A LOT
EATING MEALS: A LOT
MOVING FROM LYING ON BACK TO SITTING ON SIDE OF FLAT BED WITH BEDRAILS: A LITTLE
DAILY ACTIVITIY SCORE: 12
MOBILITY SCORE: 12
PERSONAL GROOMING: A LOT

## 2024-11-10 ASSESSMENT — PAIN - FUNCTIONAL ASSESSMENT: PAIN_FUNCTIONAL_ASSESSMENT: 0-10

## 2024-11-10 ASSESSMENT — PAIN SCALES - GENERAL: PAINLEVEL_OUTOF10: 0 - NO PAIN

## 2024-11-10 NOTE — CARE PLAN
Problem: Pain - Adult  Goal: Verbalizes/displays adequate comfort level or baseline comfort level  Outcome: Progressing     Problem: Safety - Adult  Goal: Free from fall injury  Outcome: Progressing     Problem: Discharge Planning  Goal: Discharge to home or other facility with appropriate resources  Outcome: Progressing     Problem: Chronic Conditions and Co-morbidities  Goal: Patient's chronic conditions and co-morbidity symptoms are monitored and maintained or improved  Outcome: Progressing     Problem: Skin  Goal: Decreased wound size/increased tissue granulation at next dressing change  Outcome: Progressing  Flowsheets (Taken 11/9/2024 2205)  Decreased wound size/increased tissue granulation at next dressing change: Promote sleep for wound healing  Goal: Participates in plan/prevention/treatment measures  Outcome: Progressing  Goal: Prevent/manage excess moisture  Outcome: Progressing  Goal: Prevent/minimize sheer/friction injuries  Outcome: Progressing  Goal: Promote/optimize nutrition  Outcome: Progressing  Goal: Promote skin healing  Outcome: Progressing     Problem: Fall/Injury  Goal: Not fall by end of shift  Outcome: Progressing  Goal: Be free from injury by end of the shift  Outcome: Progressing  Goal: Verbalize understanding of personal risk factors for fall in the hospital  Outcome: Progressing  Goal: Verbalize understanding of risk factor reduction measures to prevent injury from fall in the home  Outcome: Progressing  Goal: Use assistive devices by end of the shift  Outcome: Progressing  Goal: Pace activities to prevent fatigue by end of the shift  Outcome: Progressing

## 2024-11-10 NOTE — PROGRESS NOTES
Nephrology Consult Progress Note    Admit Date: 11/5/2024    Interval history:  He is more awake, no SOB  Still confuse     CURRENT MEDICATIONS:    Current Facility-Administered Medications:     acetaminophen (Tylenol) tablet 650 mg, 650 mg, oral, q6h PRN, Rome Kerns MD    cholecalciferol (Vitamin D-3) tablet 1,000 Units, 1,000 Units, oral, Daily, Rome Kerns MD, 1,000 Units at 11/10/24 0856    dextrose 5% infusion, 50 mL/hr, intravenous, Continuous, Theo Davis MD, Last Rate: 50 mL/hr at 11/10/24 1027, 50 mL/hr at 11/10/24 1027    famotidine (Pepcid) tablet 10 mg, 10 mg, oral, q48h, Rome Kerns MD, 10 mg at 11/09/24 2141    heparin (porcine) injection 5,000 Units, 5,000 Units, subcutaneous, q8h PAULETTE, Rome Kerns MD, 5,000 Units at 11/10/24 0515    ipratropium-albuteroL (Duo-Neb) 0.5-2.5 mg/3 mL nebulizer solution 3 mL, 3 mL, nebulization, q2h PRN, Rome Kerns MD, 3 mL at 11/10/24 1044    ipratropium-albuteroL (Duo-Neb) 0.5-2.5 mg/3 mL nebulizer solution 3 mL, 3 mL, nebulization, TID, Rome Kerns MD, 3 mL at 11/09/24 1904    melatonin tablet 4.5 mg, 4.5 mg, oral, q PM, Marek Chao MD, 4.5 mg at 11/09/24 2032    [COMPLETED] methylPREDNISolone sod succinate (SOLU-Medrol) 40 mg/mL injection 20 mg, 20 mg, intravenous, Daily, 20 mg at 11/10/24 0856 **FOLLOWED BY** [START ON 11/11/2024] methylPREDNISolone sod succinate (SOLU-Medrol) 40 mg/mL injection 10 mg, 10 mg, intravenous, Daily, Pallavi Mcnally,     OLANZapine (ZyPREXA) injection 5 mg, 5 mg, intramuscular, q30 min PRN, John Garcia MD, 5 mg at 11/09/24 0640    OLANZapine (ZyPREXA) tablet 2.5 mg, 2.5 mg, oral, Nightly, Marek Chao MD, 2.5 mg at 11/09/24 2033    ondansetron (Zofran) tablet 4 mg, 4 mg, oral, q8h PRN **OR** ondansetron (Zofran) injection 4 mg, 4 mg, intravenous, q8h PRN, Rome Kerns MD    oxygen (O2) therapy, , inhalation, Continuous - Inhalation, Rome Kerns MD, 4 L/min at 11/10/24 0903     "piperacillin-tazobactam (Zosyn) 2.25 g in dextrose (iso) IV 50 mL, 2.25 g, intravenous, q6h, Chicho Avina MD, Stopped at 11/10/24 1002    polyethylene glycol (Glycolax, Miralax) packet 17 g, 17 g, oral, Daily, Rome Kerns MD, 17 g at 11/10/24 0856    rosuvastatin (Crestor) tablet 10 mg, 10 mg, oral, Nightly, Rome Kerns MD, 10 mg at 11/09/24 2033    sodium bicarbonate tablet 650 mg, 650 mg, oral, BID, Rome Kerns MD, 650 mg at 11/10/24 0856    tamsulosin (Flomax) 24 hr capsule 0.4 mg, 0.4 mg, oral, Nightly, Rome Kerns MD, 0.4 mg at 11/09/24 2033       Intake/Output Summary (Last 24 hours) at 11/10/2024 1107  Last data filed at 11/10/2024 1027  Gross per 24 hour   Intake 2340 ml   Output 1050 ml   Net 1290 ml       PHYSICAL EXAM:  /67 (BP Location: Right arm, Patient Position: Lying)   Pulse 69   Temp 34.8 °C (94.6 °F) (Temporal)   Resp 16   Ht 1.651 m (5' 5\")   Wt 76.7 kg (169 lb)   SpO2 96%   BMI 28.12 kg/m²     Intake/Output Summary (Last 24 hours) at 11/10/2024 1107  Last data filed at 11/10/2024 1027  Gross per 24 hour   Intake 2340 ml   Output 1050 ml   Net 1290 ml     Gen: sleepy, awake, NAD  Neck: No JVD  Cardiac: RRR  Resp: decrease BS  Abd: Soft, non tender, +BS, non distended   Ext: No edema   Neuro: moves 4 ext  Peripheral Pulses: weak peripheral pulses.  Skin: Skin color, texture, turgor normal, no suspicious rashes or lesions.    Labs:  Results for orders placed or performed during the hospital encounter of 11/05/24 (from the past 24 hours)   POCT GLUCOSE   Result Value Ref Range    POCT Glucose 190 (H) 74 - 99 mg/dL   POCT GLUCOSE   Result Value Ref Range    POCT Glucose 275 (H) 74 - 99 mg/dL   Renal Function Panel   Result Value Ref Range    Glucose 192 (H) 74 - 99 mg/dL    Sodium 146 (H) 136 - 145 mmol/L    Potassium 5.0 3.5 - 5.3 mmol/L    Chloride 120 (H) 98 - 107 mmol/L    Bicarbonate 17 (L) 21 - 32 mmol/L    Anion Gap 14 10 - 20 mmol/L    Urea " Nitrogen 76 (H) 6 - 23 mg/dL    Creatinine 4.13 (H) 0.50 - 1.30 mg/dL    eGFR 14 (L) >60 mL/min/1.73m*2    Calcium 8.8 8.6 - 10.3 mg/dL    Phosphorus 4.6 2.5 - 4.9 mg/dL    Albumin 3.3 (L) 3.4 - 5.0 g/dL   CBC   Result Value Ref Range    WBC 12.4 (H) 4.4 - 11.3 x10*3/uL    nRBC 0.0 0.0 - 0.0 /100 WBCs    RBC 3.34 (L) 4.50 - 5.90 x10*6/uL    Hemoglobin 11.0 (L) 13.5 - 17.5 g/dL    Hematocrit 35.7 (L) 41.0 - 52.0 %     (H) 80 - 100 fL    MCH 32.9 26.0 - 34.0 pg    MCHC 30.8 (L) 32.0 - 36.0 g/dL    RDW 19.0 (H) 11.5 - 14.5 %    Platelets 93 (L) 150 - 450 x10*3/uL   POCT GLUCOSE   Result Value Ref Range    POCT Glucose 157 (H) 74 - 99 mg/dL        DATA:   Diagnostic tests reviewed for today's visit:    New labs and imaging   RFP, CBC and lactate seen    Assessment and Plan:  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 Hypotension, weakness, found to have worsening infiltrates more consistent with PNA, on zosyn, also had iv lasix  - CHRISTIAN on CKD stage 4/5 (baseline Scr 4.5-4.9): Scr stable at baseline  BP: controlled  Hypernatremia: due to dehydration, better Na down to 146, had gentle D5W drip  AGMA, worsening from CKD, stable   Hb 11     PLAN:  - to finish gentle D5W drip today, encouraging po fluid intake, increasing po bicarbonate dose, supportive care, no need of dialysis     Will continue to follow.   patient at-risk for clinically significant deterioration      Signature: Theo Davis MD

## 2024-11-10 NOTE — PROGRESS NOTES
"Antonio Willis is a 79 y.o. male on day 5 of admission presenting with Pneumonia of right upper lobe due to infectious organism.    Subjective    Seen and examined,  He is still hallucinating.  But he appears more comfortable than yesterday.  He is DO NOT RESUSCITATE DNI.       Objective     Physical Exam  Awake comfortable.  HEENT unremarkable.  Neck no JVD.  Heart S1-S2.  Lungs RHONCHIAbdomen is soft nontender.  Legs no edema  Last Recorded Vitals  Blood pressure 139/67, pulse 69, temperature 34.8 °C (94.6 °F), temperature source Temporal, resp. rate 16, height 1.651 m (5' 5\"), weight 76.7 kg (169 lb), SpO2 96%.  Intake/Output last 3 Shifts:  I/O last 3 completed shifts:  In: 2380.8 (31.1 mL/kg) [P.O.:240; I.V.:1840.8 (24 mL/kg); IV Piggyback:300]  Out: 1750 (22.8 mL/kg) [Urine:1750 (0.6 mL/kg/hr)]  Weight: 76.7 kg     Relevant Results  Results for orders placed or performed during the hospital encounter of 11/05/24 (from the past 96 hours)   Basic metabolic panel   Result Value Ref Range    Glucose 137 (H) 74 - 99 mg/dL    Sodium 150 (H) 136 - 145 mmol/L    Potassium 4.3 3.5 - 5.3 mmol/L    Chloride 126 (H) 98 - 107 mmol/L    Bicarbonate 17 (L) 21 - 32 mmol/L    Anion Gap 11 10 - 20 mmol/L    Urea Nitrogen 90 (H) 6 - 23 mg/dL    Creatinine 4.72 (H) 0.50 - 1.30 mg/dL    eGFR 12 (L) >60 mL/min/1.73m*2    Calcium 9.3 8.6 - 10.3 mg/dL   Lavender Top   Result Value Ref Range    Extra Tube Hold for add-ons.    Basic metabolic panel   Result Value Ref Range    Glucose 125 (H) 74 - 99 mg/dL    Sodium 149 (H) 136 - 145 mmol/L    Potassium 4.6 3.5 - 5.3 mmol/L    Chloride 124 (H) 98 - 107 mmol/L    Bicarbonate 16 (L) 21 - 32 mmol/L    Anion Gap 14 10 - 20 mmol/L    Urea Nitrogen 81 (H) 6 - 23 mg/dL    Creatinine 4.70 (H) 0.50 - 1.30 mg/dL    eGFR 12 (L) >60 mL/min/1.73m*2    Calcium 9.2 8.6 - 10.3 mg/dL   Basic metabolic panel   Result Value Ref Range    Glucose 154 (H) 74 - 99 mg/dL    Sodium 149 (H) 136 - 145 mmol/L    " Potassium 5.3 3.5 - 5.3 mmol/L    Chloride 126 (H) 98 - 107 mmol/L    Bicarbonate 15 (L) 21 - 32 mmol/L    Anion Gap 13 10 - 20 mmol/L    Urea Nitrogen 78 (H) 6 - 23 mg/dL    Creatinine 4.59 (H) 0.50 - 1.30 mg/dL    eGFR 12 (L) >60 mL/min/1.73m*2    Calcium 9.2 8.6 - 10.3 mg/dL   CBC and Auto Differential   Result Value Ref Range    WBC 7.3 4.4 - 11.3 x10*3/uL    nRBC 0.0 0.0 - 0.0 /100 WBCs    RBC 3.16 (L) 4.50 - 5.90 x10*6/uL    Hemoglobin 10.3 (L) 13.5 - 17.5 g/dL    Hematocrit 33.2 (L) 41.0 - 52.0 %     (H) 80 - 100 fL    MCH 32.6 26.0 - 34.0 pg    MCHC 31.0 (L) 32.0 - 36.0 g/dL    RDW 19.2 (H) 11.5 - 14.5 %    Platelets 94 (L) 150 - 450 x10*3/uL    Neutrophils % 94.1 40.0 - 80.0 %    Immature Granulocytes %, Automated 0.7 0.0 - 0.9 %    Lymphocytes % 4.7 13.0 - 44.0 %    Monocytes % 0.4 2.0 - 10.0 %    Eosinophils % 0.0 0.0 - 6.0 %    Basophils % 0.1 0.0 - 2.0 %    Neutrophils Absolute 6.83 (H) 1.60 - 5.50 x10*3/uL    Immature Granulocytes Absolute, Automated 0.05 0.00 - 0.50 x10*3/uL    Lymphocytes Absolute 0.34 (L) 0.80 - 3.00 x10*3/uL    Monocytes Absolute 0.03 (L) 0.05 - 0.80 x10*3/uL    Eosinophils Absolute 0.00 0.00 - 0.40 x10*3/uL    Basophils Absolute 0.01 0.00 - 0.10 x10*3/uL   Blood Culture    Specimen: Peripheral Venipuncture; Blood culture   Result Value Ref Range    Blood Culture No growth at 2 days    Lactate   Result Value Ref Range    Lactate 1.6 0.4 - 2.0 mmol/L   CBC and Auto Differential   Result Value Ref Range    WBC 15.2 (H) 4.4 - 11.3 x10*3/uL    nRBC 0.0 0.0 - 0.0 /100 WBCs    RBC 3.18 (L) 4.50 - 5.90 x10*6/uL    Hemoglobin 10.4 (L) 13.5 - 17.5 g/dL    Hematocrit 33.3 (L) 41.0 - 52.0 %     (H) 80 - 100 fL    MCH 32.7 26.0 - 34.0 pg    MCHC 31.2 (L) 32.0 - 36.0 g/dL    RDW 19.4 (H) 11.5 - 14.5 %    Platelets 104 (L) 150 - 450 x10*3/uL    Neutrophils % 87.6 40.0 - 80.0 %    Immature Granulocytes %, Automated 1.6 (H) 0.0 - 0.9 %    Lymphocytes % 4.7 13.0 - 44.0 %    Monocytes  % 5.9 2.0 - 10.0 %    Eosinophils % 0.1 0.0 - 6.0 %    Basophils % 0.1 0.0 - 2.0 %    Neutrophils Absolute 13.30 (H) 1.60 - 5.50 x10*3/uL    Immature Granulocytes Absolute, Automated 0.24 0.00 - 0.50 x10*3/uL    Lymphocytes Absolute 0.72 (L) 0.80 - 3.00 x10*3/uL    Monocytes Absolute 0.89 (H) 0.05 - 0.80 x10*3/uL    Eosinophils Absolute 0.01 0.00 - 0.40 x10*3/uL    Basophils Absolute 0.02 0.00 - 0.10 x10*3/uL   Comprehensive metabolic panel   Result Value Ref Range    Glucose 111 (H) 74 - 99 mg/dL    Sodium 151 (H) 136 - 145 mmol/L    Potassium 4.9 3.5 - 5.3 mmol/L    Chloride 123 (H) 98 - 107 mmol/L    Bicarbonate 18 (L) 21 - 32 mmol/L    Anion Gap 15 10 - 20 mmol/L    Urea Nitrogen 84 (H) 6 - 23 mg/dL    Creatinine 4.72 (H) 0.50 - 1.30 mg/dL    eGFR 12 (L) >60 mL/min/1.73m*2    Calcium 9.2 8.6 - 10.3 mg/dL    Albumin 3.5 3.4 - 5.0 g/dL    Alkaline Phosphatase 228 (H) 33 - 136 U/L    Total Protein 6.6 6.4 - 8.2 g/dL    AST 29 9 - 39 U/L    Bilirubin, Total 0.5 0.0 - 1.2 mg/dL    ALT 97 (H) 10 - 52 U/L   Renal function panel   Result Value Ref Range    Glucose 130 (H) 74 - 99 mg/dL    Sodium 148 (H) 136 - 145 mmol/L    Potassium 5.0 3.5 - 5.3 mmol/L    Chloride 120 (H) 98 - 107 mmol/L    Bicarbonate 19 (L) 21 - 32 mmol/L    Anion Gap 14 10 - 20 mmol/L    Urea Nitrogen 81 (H) 6 - 23 mg/dL    Creatinine 4.57 (H) 0.50 - 1.30 mg/dL    eGFR 12 (L) >60 mL/min/1.73m*2    Calcium 9.4 8.6 - 10.3 mg/dL    Phosphorus 4.7 2.5 - 4.9 mg/dL    Albumin 3.6 3.4 - 5.0 g/dL   POCT GLUCOSE   Result Value Ref Range    POCT Glucose 190 (H) 74 - 99 mg/dL   POCT GLUCOSE   Result Value Ref Range    POCT Glucose 275 (H) 74 - 99 mg/dL   Renal Function Panel   Result Value Ref Range    Glucose 192 (H) 74 - 99 mg/dL    Sodium 146 (H) 136 - 145 mmol/L    Potassium 5.0 3.5 - 5.3 mmol/L    Chloride 120 (H) 98 - 107 mmol/L    Bicarbonate 17 (L) 21 - 32 mmol/L    Anion Gap 14 10 - 20 mmol/L    Urea Nitrogen 76 (H) 6 - 23 mg/dL    Creatinine 4.13 (H)  0.50 - 1.30 mg/dL    eGFR 14 (L) >60 mL/min/1.73m*2    Calcium 8.8 8.6 - 10.3 mg/dL    Phosphorus 4.6 2.5 - 4.9 mg/dL    Albumin 3.3 (L) 3.4 - 5.0 g/dL   CBC   Result Value Ref Range    WBC 12.4 (H) 4.4 - 11.3 x10*3/uL    nRBC 0.0 0.0 - 0.0 /100 WBCs    RBC 3.34 (L) 4.50 - 5.90 x10*6/uL    Hemoglobin 11.0 (L) 13.5 - 17.5 g/dL    Hematocrit 35.7 (L) 41.0 - 52.0 %     (H) 80 - 100 fL    MCH 32.9 26.0 - 34.0 pg    MCHC 30.8 (L) 32.0 - 36.0 g/dL    RDW 19.0 (H) 11.5 - 14.5 %    Platelets 93 (L) 150 - 450 x10*3/uL   POCT GLUCOSE   Result Value Ref Range    POCT Glucose 157 (H) 74 - 99 mg/dL        Medications:  cholecalciferol, 1,000 Units, oral, Daily  famotidine, 10 mg, oral, q48h  heparin (porcine), 5,000 Units, subcutaneous, q8h PAULETTE  ipratropium-albuteroL, 3 mL, nebulization, TID  melatonin, 4.5 mg, oral, q PM  [START ON 11/11/2024] methylPREDNISolone sodium succinate (PF), 10 mg, intravenous, Daily  OLANZapine, 2.5 mg, oral, Nightly  oxygen, , inhalation, Continuous - Inhalation  piperacillin-tazobactam, 2.25 g, intravenous, q6h  polyethylene glycol, 17 g, oral, Daily  rosuvastatin, 10 mg, oral, Nightly  sodium bicarbonate, 650 mg, oral, BID  tamsulosin, 0.4 mg, oral, Nightly      PRN medications: acetaminophen, ipratropium-albuteroL, OLANZapine, ondansetron **OR** ondansetron    ECG 12 lead    Result Date: 11/9/2024  Atrial-paced rhythm with prolonged AV conduction Right bundle branch block Abnormal ECG When compared with ECG of 02-OCT-2024 10:58, (unconfirmed) No significant change was found See ED provider note for full interpretation and clinical correlation Confirmed by Rosa Rocha ([id number]) on 11/9/2024 11:48:27 AM    CT abdomen pelvis wo IV contrast    Result Date: 11/6/2024  Interpreted By:  Chase Rivera, STUDY: CT ABDOMEN PELVIS WO IV CONTRAST;  11/6/2024 2:59 pm   INDICATION: Signs/Symptoms:nephrolithiasis and history ogf Prostate carcinoma.     COMPARISON: 07/26/2023   ACCESSION NUMBER(S):  [id number]   ORDERING CLINICIAN: YUDELKA HANKINS   TECHNIQUE: CT of the abdomen and pelvis was performed. Contiguous axial images were obtained at 3 mm slice thickness through the abdomen and pelvis. Coronal and sagittal reconstructions at 3 mm slice thickness were performed.  No intravenous contrast was administered.   FINDINGS: Please note that the evaluation of vessels, lymph nodes and organs is limited without intravenous contrast.   LOWER CHEST: The included lung bases demonstrate bibasilar bronchiectasis. Bibasilar infiltrates and interstitial thickening. Findings have progressed slightly since the prior exam. Cardiac pacer.   ABDOMEN:   LIVER: Grossly unremarkable. No definite focal liver lesions.   BILE DUCTS: No bile duct dilatation.   GALLBLADDER: Suboptimally distended and evaluated. No definite gallstones.   PANCREAS: Unremarkable pancreas.   SPLEEN: Unremarkable spleen.   ADRENAL GLANDS: Grossly unremarkable.   KIDNEYS AND URETERS: Cortical thinning of the left kidney with severe left-sided hydronephrosis and hydroureter. Nonobstructing left renal stones. There is a tiny stone within the dependent portion of the dilated distal left ureter measuring approximately 4.5 mm. Also another punctate 2 mm stone near the left UVJ. The larger previously noted distal left ureteral stone is no longer seen.   PELVIS:   BLADDER: Grossly unremarkable.   REPRODUCTIVE ORGANS: Calcifications within the prostate.       ABDOMINAL WALL: Fat containing left inguinal hernia.   BONES: Discogenic degenerative changes. Scoliosis.   No ascites.No retroperitoneal adenopathy.       1.  Persistent severe left-sided hydronephrosis and hydroureter with cortical thinning of the left kidney. However previously noted larger distal left ureteral stone has resolved with residual much smaller 4 mm stone in the distal left ureter as described. 2. Worsening bibasilar bronchiectasis and interstitial thickening and ground-glass infiltrates.      MACRO: None   Signed by: Chase Rivera 11/6/2024 4:17 PM Dictation workstation:   [id number]    XR chest 2 views    Result Date: 11/5/2024  Interpreted By:  Pepe Scruggs, STUDY: XR CHEST 2 VIEWS;  11/5/2024 7:00 pm   INDICATION: Signs/Symptoms:cough.     COMPARISON: Radiographs of the chest dated 08/25/2024.   ACCESSION NUMBER(S): [id number]   ORDERING CLINICIAN: GEORGE LOPEZ   FINDINGS: PA and lateral radiographs of the chest were provided.   Dual lead left subclavian pacemaker is in place.   CARDIOMEDIASTINAL SILHOUETTE: Cardiomediastinal silhouette is mildly enlarged, similar in appearance to prior exam.   LUNGS: There is redemonstration of the interstitial prominence in the lungs bilaterally, likely representing component of chronic interstitial lung disease, with some increase in airspace opacities in the right upper lung compared to prior study on 08/24/2024. No evidence of pneumothorax or sizable pleural effusion.   ABDOMEN: No remarkable upper abdominal findings.   BONES: No acute osseous changes.       1.  Somewhat increased interstitial markings in the right upper lung compared to prior exam on 08/25/2024, possibly representing developing airspace infiltrate. 2. Prominence of the interstitial markings in the lungs bilaterally, likely representing component of chronic interstitial lung disease.   MACRO: None   Signed by: Pepe Scruggs 11/5/2024 7:12 PM Dictation workstation:   [id number]      Assessment/Plan   Principal Problem:    Pneumonia of right upper lobe due to infectious organism  Active Problems:    Acute pulmonary edema    Acute on chronic hypoxic respiratory failure (Multi)    Interstitial lung disease (Multi)    Acute metabolic encephalopathy       79-year-old  male, who is known to have a history of multiple sclerosis, with no deficits, history of permanent pacemaker, hypertension, COVID infection about 2 months ago, since then he had been on oxygen,  increasing shortness of breath, smoker, history of prostate cancer, history of chronic hydronephrosis of the left kidney, BPH, nephrolithiasis, and he came to the emergency department, because of increasing shortness of breath,.  Acute on chronic respiratory failure, chronic lung disease, multifactorial, could be a pneumonia underlying interstitial lung disease, post-COVID lung disease, also recent pulmonary hypertension with right heart failure and cor pulmonale, he was given IV Lasix, but he has problem with swallowing, he was evaluated by the speech therapist, essentially he needed to be n.p.o., but the family wife refused the [place], seen by the palliative care, we are giving pleasure diet, he is DO NOT RESUSCITATE DO NOT INTUBATE, also hospice discussion is ongoing, his sodium is high, as a result renal has started on D5 water, his oxygen saturation is better now   acute delirium, and metabolic encephalopathy secondary to above.  Recent COVID,.  CKD 4, will monitor closely, he does have chronic CKD, history of prostate cancer,.  History of prostate cancer, history of radiation for the prostate and BPH and nephrolithiasis, and chronic hydronephrosis.  Hyperlipidemia same        Again he is DO NOT RESUSCITATE, on pressure diet, IV fluids, supportive care,    I spent 25 minutes in the professional and overall care of this patient.    Chicho Avina MD

## 2024-11-11 LAB
ANION GAP SERPL CALC-SCNC: 13 MMOL/L (ref 10–20)
BACTERIA BLD CULT: NORMAL
BUN SERPL-MCNC: 71 MG/DL (ref 6–23)
CALCIUM SERPL-MCNC: 8.8 MG/DL (ref 8.6–10.3)
CHLORIDE SERPL-SCNC: 120 MMOL/L (ref 98–107)
CO2 SERPL-SCNC: 20 MMOL/L (ref 21–32)
CREAT SERPL-MCNC: 3.87 MG/DL (ref 0.5–1.3)
EGFRCR SERPLBLD CKD-EPI 2021: 15 ML/MIN/1.73M*2
ERYTHROCYTE [DISTWIDTH] IN BLOOD BY AUTOMATED COUNT: 19.1 % (ref 11.5–14.5)
GLUCOSE SERPL-MCNC: 81 MG/DL (ref 74–99)
HCT VFR BLD AUTO: 35.2 % (ref 41–52)
HGB BLD-MCNC: 11 G/DL (ref 13.5–17.5)
MCH RBC QN AUTO: 32 PG (ref 26–34)
MCHC RBC AUTO-ENTMCNC: 31.3 G/DL (ref 32–36)
MCV RBC AUTO: 102 FL (ref 80–100)
NRBC BLD-RTO: 0 /100 WBCS (ref 0–0)
PLATELET # BLD AUTO: 103 X10*3/UL (ref 150–450)
POTASSIUM SERPL-SCNC: 5 MMOL/L (ref 3.5–5.3)
RBC # BLD AUTO: 3.44 X10*6/UL (ref 4.5–5.9)
SODIUM SERPL-SCNC: 148 MMOL/L (ref 136–145)
WBC # BLD AUTO: 9 X10*3/UL (ref 4.4–11.3)

## 2024-11-11 PROCEDURE — 2500000004 HC RX 250 GENERAL PHARMACY W/ HCPCS (ALT 636 FOR OP/ED): Performed by: INTERNAL MEDICINE

## 2024-11-11 PROCEDURE — 2500000002 HC RX 250 W HCPCS SELF ADMINISTERED DRUGS (ALT 637 FOR MEDICARE OP, ALT 636 FOR OP/ED): Performed by: INTERNAL MEDICINE

## 2024-11-11 PROCEDURE — 36415 COLL VENOUS BLD VENIPUNCTURE: CPT | Performed by: INTERNAL MEDICINE

## 2024-11-11 PROCEDURE — 2500000005 HC RX 250 GENERAL PHARMACY W/O HCPCS: Performed by: INTERNAL MEDICINE

## 2024-11-11 PROCEDURE — 1100000001 HC PRIVATE ROOM DAILY

## 2024-11-11 PROCEDURE — 94640 AIRWAY INHALATION TREATMENT: CPT

## 2024-11-11 PROCEDURE — 97161 PT EVAL LOW COMPLEX 20 MIN: CPT | Mod: GP

## 2024-11-11 PROCEDURE — 99232 SBSQ HOSP IP/OBS MODERATE 35: CPT | Performed by: NURSE PRACTITIONER

## 2024-11-11 PROCEDURE — 99233 SBSQ HOSP IP/OBS HIGH 50: CPT | Performed by: INTERNAL MEDICINE

## 2024-11-11 PROCEDURE — 80048 BASIC METABOLIC PNL TOTAL CA: CPT | Performed by: INTERNAL MEDICINE

## 2024-11-11 PROCEDURE — 2500000001 HC RX 250 WO HCPCS SELF ADMINISTERED DRUGS (ALT 637 FOR MEDICARE OP): Performed by: INTERNAL MEDICINE

## 2024-11-11 PROCEDURE — 85027 COMPLETE CBC AUTOMATED: CPT | Performed by: INTERNAL MEDICINE

## 2024-11-11 PROCEDURE — 99232 SBSQ HOSP IP/OBS MODERATE 35: CPT | Performed by: INTERNAL MEDICINE

## 2024-11-11 RX ADMIN — Medication 4.5 MG: at 22:15

## 2024-11-11 RX ADMIN — METHYLPREDNISOLONE SODIUM SUCCINATE 10 MG: 40 INJECTION, POWDER, FOR SOLUTION INTRAMUSCULAR; INTRAVENOUS at 09:16

## 2024-11-11 RX ADMIN — SODIUM BICARBONATE 650 MG: 650 TABLET ORAL at 22:15

## 2024-11-11 RX ADMIN — Medication 5 L/MIN: at 08:18

## 2024-11-11 RX ADMIN — IPRATROPIUM BROMIDE AND ALBUTEROL SULFATE 3 ML: 2.5; .5 SOLUTION RESPIRATORY (INHALATION) at 14:23

## 2024-11-11 RX ADMIN — TAMSULOSIN HYDROCHLORIDE 0.4 MG: 0.4 CAPSULE ORAL at 22:15

## 2024-11-11 RX ADMIN — PIPERACILLIN SODIUM AND TAZOBACTAM SODIUM 2.25 G: 2; .25 INJECTION, SOLUTION INTRAVENOUS at 09:52

## 2024-11-11 RX ADMIN — FAMOTIDINE 10 MG: 20 TABLET, FILM COATED ORAL at 22:15

## 2024-11-11 RX ADMIN — PIPERACILLIN SODIUM AND TAZOBACTAM SODIUM 2.25 G: 2; .25 INJECTION, SOLUTION INTRAVENOUS at 15:50

## 2024-11-11 RX ADMIN — HEPARIN SODIUM 5000 UNITS: 5000 INJECTION, SOLUTION INTRAVENOUS; SUBCUTANEOUS at 06:16

## 2024-11-11 RX ADMIN — IPRATROPIUM BROMIDE AND ALBUTEROL SULFATE 3 ML: 2.5; .5 SOLUTION RESPIRATORY (INHALATION) at 07:13

## 2024-11-11 RX ADMIN — ROSUVASTATIN CALCIUM 10 MG: 10 TABLET, FILM COATED ORAL at 22:15

## 2024-11-11 RX ADMIN — HEPARIN SODIUM 5000 UNITS: 5000 INJECTION, SOLUTION INTRAVENOUS; SUBCUTANEOUS at 14:43

## 2024-11-11 RX ADMIN — SODIUM BICARBONATE 650 MG: 650 TABLET ORAL at 08:43

## 2024-11-11 RX ADMIN — SODIUM BICARBONATE 650 MG: 650 TABLET ORAL at 16:44

## 2024-11-11 RX ADMIN — POLYETHYLENE GLYCOL 3350 17 G: 17 POWDER, FOR SOLUTION ORAL at 08:43

## 2024-11-11 RX ADMIN — Medication 1000 UNITS: at 08:43

## 2024-11-11 RX ADMIN — PIPERACILLIN SODIUM AND TAZOBACTAM SODIUM 2.25 G: 2; .25 INJECTION, SOLUTION INTRAVENOUS at 22:15

## 2024-11-11 RX ADMIN — PIPERACILLIN SODIUM AND TAZOBACTAM SODIUM 2.25 G: 2; .25 INJECTION, SOLUTION INTRAVENOUS at 02:59

## 2024-11-11 RX ADMIN — OLANZAPINE 2.5 MG: 5 TABLET, FILM COATED ORAL at 22:15

## 2024-11-11 ASSESSMENT — COGNITIVE AND FUNCTIONAL STATUS - GENERAL
PERSONAL GROOMING: TOTAL
HELP NEEDED FOR BATHING: TOTAL
TOILETING: TOTAL
TURNING FROM BACK TO SIDE WHILE IN FLAT BAD: A LOT
EATING MEALS: A LOT
STANDING UP FROM CHAIR USING ARMS: TOTAL
MOVING FROM LYING ON BACK TO SITTING ON SIDE OF FLAT BED WITH BEDRAILS: A LOT
CLIMB 3 TO 5 STEPS WITH RAILING: TOTAL
EATING MEALS: A LOT
MOBILITY SCORE: 11
DRESSING REGULAR UPPER BODY CLOTHING: TOTAL
DRESSING REGULAR LOWER BODY CLOTHING: TOTAL
MOVING TO AND FROM BED TO CHAIR: A LOT
PERSONAL GROOMING: TOTAL
CLIMB 3 TO 5 STEPS WITH RAILING: TOTAL
TURNING FROM BACK TO SIDE WHILE IN FLAT BAD: A LOT
STANDING UP FROM CHAIR USING ARMS: A LOT
MOBILITY SCORE: 11
WALKING IN HOSPITAL ROOM: TOTAL
STANDING UP FROM CHAIR USING ARMS: A LOT
MOVING FROM LYING ON BACK TO SITTING ON SIDE OF FLAT BED WITH BEDRAILS: A LITTLE
HELP NEEDED FOR BATHING: TOTAL
DAILY ACTIVITIY SCORE: 7
WALKING IN HOSPITAL ROOM: TOTAL
DRESSING REGULAR UPPER BODY CLOTHING: TOTAL
DRESSING REGULAR LOWER BODY CLOTHING: TOTAL
CLIMB 3 TO 5 STEPS WITH RAILING: TOTAL
WALKING IN HOSPITAL ROOM: TOTAL
TOILETING: TOTAL
MOVING TO AND FROM BED TO CHAIR: A LOT
MOVING FROM LYING ON BACK TO SITTING ON SIDE OF FLAT BED WITH BEDRAILS: A LITTLE
MOBILITY SCORE: 8
TURNING FROM BACK TO SIDE WHILE IN FLAT BAD: A LOT
MOVING TO AND FROM BED TO CHAIR: TOTAL
DAILY ACTIVITIY SCORE: 7

## 2024-11-11 ASSESSMENT — PAIN - FUNCTIONAL ASSESSMENT
PAIN_FUNCTIONAL_ASSESSMENT: 0-10

## 2024-11-11 ASSESSMENT — PAIN SCALES - GENERAL
PAINLEVEL_OUTOF10: 0 - NO PAIN

## 2024-11-11 NOTE — PROGRESS NOTES
11/11/2024 12:01 PM  I spoke with patient's wife. She requests referrals to [facility] and [facility]. Qiana HOOVER

## 2024-11-11 NOTE — CARE PLAN
Problem: Skin  Goal: Decreased wound size/increased tissue granulation at next dressing change  11/11/2024 1302 by Inez Philippe, OLY  Outcome: Progressing  11/11/2024 1301 by Inez Philippe RN  Outcome: Progressing  11/11/2024 1216 by Inez Philippe RN  Outcome: Progressing  11/11/2024 1208 by Inez Philippe, RN  Outcome: Progressing  11/11/2024 1152 by Inez Philippe RN  Outcome: Progressing  Goal: Participates in plan/prevention/treatment measures  11/11/2024 1302 by Inez Philippe, OLY  Outcome: Progressing  11/11/2024 1301 by Inez Philippe RN  Outcome: Progressing  11/11/2024 1208 by Inez Philippe RN  Outcome: Progressing  11/11/2024 1152 by Inez Philippe RN  Outcome: Progressing  Goal: Prevent/manage excess moisture  11/11/2024 1302 by Inez Philippe, OLY  Outcome: Progressing  11/11/2024 1301 by Inez Philippe, OLY  Outcome: Progressing  11/11/2024 1208 by Inez Philippe, OLY  Outcome: Progressing  11/11/2024 1152 by Inez Philippe RN  Outcome: Progressing  Goal: Prevent/minimize sheer/friction injuries  11/11/2024 1302 by Inez Philippe RN  Outcome: Progressing  11/11/2024 1301 by Inez Philippe RN  Outcome: Progressing  11/11/2024 1152 by Inez Philippe RN  Outcome: Progressing  Goal: Promote/optimize nutrition  11/11/2024 1302 by Inez Philippe, OLY  Outcome: Progressing  11/11/2024 1301 by Inez Philippe, OLY  Outcome: Progressing  11/11/2024 1152 by Inez Philippe RN  Outcome: Progressing  Goal: Promote skin healing  11/11/2024 1302 by Inez Philippe, OLY  Outcome: Progressing  11/11/2024 1301 by Inez Philippe RN  Outcome: Progressing  11/11/2024 1152 by Inez Philippe RN  Outcome: Progressing   The patient's goals for the shift include sleep    The clinical goals for the shift include safety to be maintained    Over the shift, the patient did not make progress toward the following goals. Barriers to progression include . Recommendations to address these barriers include .

## 2024-11-11 NOTE — CARE PLAN
Problem: Skin  Goal: Decreased wound size/increased tissue granulation at next dressing change  11/11/2024 1305 by Inez Philippe, RN  Outcome: Progressing  11/11/2024 1302 by Inez Philippe, RN  Outcome: Progressing  11/11/2024 1301 by Inez Philippe, RN  Outcome: Progressing  11/11/2024 1216 by Inez Philippe, RN  Outcome: Progressing  11/11/2024 1208 by Inez Philippe, RN  Outcome: Progressing  11/11/2024 1152 by Inez Philippe, RN  Outcome: Progressing  Goal: Participates in plan/prevention/treatment measures  11/11/2024 1305 by Inez Philippe, RN  Outcome: Progressing  11/11/2024 1302 by Inez Philippe, RN  Outcome: Progressing  11/11/2024 1301 by Inez Philippe, RN  Outcome: Progressing  11/11/2024 1208 by Inez Philippe, RN  Outcome: Progressing  11/11/2024 1152 by Inez Philippe, RN  Outcome: Progressing  Goal: Prevent/manage excess moisture  11/11/2024 1305 by Inez Philippe, RN  Outcome: Progressing  11/11/2024 1302 by Inez Philippe, RN  Outcome: Progressing  11/11/2024 1301 by Inez Philippe, RN  Outcome: Progressing  11/11/2024 1208 by Inez Philippe, RN  Outcome: Progressing  11/11/2024 1152 by Inez Philippe, RN  Outcome: Progressing  Goal: Prevent/minimize sheer/friction injuries  11/11/2024 1305 by Inez Philippe, RN  Outcome: Progressing  11/11/2024 1302 by Inez Philippe, RN  Outcome: Progressing  11/11/2024 1301 by Inez Philippe, RN  Outcome: Progressing  11/11/2024 1152 by Inez Philippe, RN  Outcome: Progressing  Goal: Promote/optimize nutrition  11/11/2024 1305 by Inez Philippe, RN  Outcome: Progressing  11/11/2024 1302 by Inez Philippe, RN  Outcome: Progressing  11/11/2024 1301 by Inez Philippe, RN  Outcome: Progressing  11/11/2024 1152 by Inez Philippe, RN  Outcome: Progressing  Goal: Promote skin healing  11/11/2024 1305 by Inez Philippe, RN  Outcome: Progressing  11/11/2024 1302 by Inez Philippe RN  Outcome: Progressing  11/11/2024 1301 by Inez Philippe RN  Outcome: Progressing  11/11/2024 1152 by Inez Philippe RN  Outcome: Progressing   The patient's goals  for the shift include sleep    The clinical goals for the shift include safety to be maintained    Over the shift, the patient did not make progress toward the following goals. Barriers to progression include . Recommendations to address these barriers include .

## 2024-11-11 NOTE — CARE PLAN
Problem: Skin  Goal: Decreased wound size/increased tissue granulation at next dressing change  11/11/2024 1324 by Inez Philippe, RN  Outcome: Progressing  11/11/2024 1311 by Inez Philippe, RN  Outcome: Progressing  11/11/2024 1310 by Inez Philippe, RN  Outcome: Progressing  11/11/2024 1305 by Inez Philippe, RN  Outcome: Progressing  11/11/2024 1302 by Inez Philippe, RN  Outcome: Progressing  11/11/2024 1301 by Inez Philippe, RN  Outcome: Progressing  11/11/2024 1216 by Inez Philippe, RN  Outcome: Progressing  11/11/2024 1208 by Inez Philippe, RN  Outcome: Progressing  11/11/2024 1152 by Inez Philippe, RN  Outcome: Progressing  Goal: Participates in plan/prevention/treatment measures  11/11/2024 1324 by Inez Philippe, RN  Outcome: Progressing  11/11/2024 1311 by Inez Philippe, RN  Outcome: Progressing  11/11/2024 1310 by Inez Philippe, RN  Outcome: Progressing  11/11/2024 1305 by Inez Philippe, RN  Outcome: Progressing  11/11/2024 1302 by Inez Philippe, RN  Outcome: Progressing  11/11/2024 1301 by Inez Philippe, RN  Outcome: Progressing  11/11/2024 1208 by Inez Philippe, RN  Outcome: Progressing  11/11/2024 1152 by Inez Philippe, RN  Outcome: Progressing  Goal: Prevent/manage excess moisture  11/11/2024 1324 by Inez Philippe, RN  Outcome: Progressing  11/11/2024 1311 by Inez Philippe, RN  Outcome: Progressing  11/11/2024 1310 by Inez Philippe, RN  Outcome: Progressing  11/11/2024 1305 by Inez Philippe, RN  Outcome: Progressing  11/11/2024 1302 by Inez Philippe, RN  Outcome: Progressing  11/11/2024 1301 by Inez Philippe, RN  Outcome: Progressing  11/11/2024 1208 by Inez Philippe, RN  Outcome: Progressing  11/11/2024 1152 by Inez Philippe, RN  Outcome: Progressing  Goal: Prevent/minimize sheer/friction injuries  11/11/2024 1324 by Cinda Philippe, RN  Outcome: Progressing  11/11/2024 1311 by Inez Philippe, RN  Outcome: Progressing  11/11/2024 1310 by Inez Philippe, RN  Outcome: Progressing  11/11/2024 1305 by Inez Philippe, RN  Outcome: Progressing  11/11/2024 1302 by Inez Flanagan  Jose Martin, RN  Outcome: Progressing  11/11/2024 1301 by Inez Philippe, RN  Outcome: Progressing  11/11/2024 1152 by Inez Philippe RN  Outcome: Progressing  Goal: Promote/optimize nutrition  11/11/2024 1324 by Inez Philippe, RN  Outcome: Progressing  11/11/2024 1311 by Inez Philippe, RN  Outcome: Progressing  11/11/2024 1310 by Inez Philippe, RN  Outcome: Progressing  11/11/2024 1305 by Inez Philippe, RN  Outcome: Progressing  11/11/2024 1302 by Inez Philippe RN  Outcome: Progressing  11/11/2024 1301 by Inez Philippe, RN  Outcome: Progressing  11/11/2024 1152 by Inez Philippe, RN  Outcome: Progressing  Goal: Promote skin healing  11/11/2024 1324 by Inez Philippe, RN  Outcome: Progressing  11/11/2024 1311 by Inez Philippe, RN  Outcome: Progressing  11/11/2024 1310 by Inez Philippe, RN  Outcome: Progressing  11/11/2024 1305 by Inez Philippe, RN  Outcome: Progressing  11/11/2024 1302 by Inez Philippe, RN  Outcome: Progressing  11/11/2024 1301 by Inez Philippe, RN  Outcome: Progressing  11/11/2024 1152 by Inez Philippe RN  Outcome: Progressing   The patient's goals for the shift include sleep    The clinical goals for the shift include safety to be maintained    Over the shift, the patient did not make progress toward the following goals. Barriers to progression include PROMOTE REST. Recommendations to address these barriers include PROMOTE GOOD NUTRITION.

## 2024-11-11 NOTE — CARE PLAN
Problem: Skin  Goal: Decreased wound size/increased tissue granulation at next dressing change  11/11/2024 1311 by Inez Philippe, RN  Outcome: Progressing  11/11/2024 1310 by Inez Philippe, RN  Outcome: Progressing  11/11/2024 1305 by Inez Philippe, RN  Outcome: Progressing  11/11/2024 1302 by Inez Philippe, RN  Outcome: Progressing  11/11/2024 1301 by Inez Philippe, RN  Outcome: Progressing  11/11/2024 1216 by Inez Philippe, RN  Outcome: Progressing  11/11/2024 1208 by Inez Philippe, RN  Outcome: Progressing  11/11/2024 1152 by Inez Philippe, RN  Outcome: Progressing  Goal: Participates in plan/prevention/treatment measures  11/11/2024 1311 by Inez Philippe, RN  Outcome: Progressing  11/11/2024 1310 by Inez Philippe, RN  Outcome: Progressing  11/11/2024 1305 by Inez Philippe, RN  Outcome: Progressing  11/11/2024 1302 by Inez Philippe, RN  Outcome: Progressing  11/11/2024 1301 by Inez Philippe, RN  Outcome: Progressing  11/11/2024 1208 by Inez Philippe, RN  Outcome: Progressing  11/11/2024 1152 by Inez Philippe, RN  Outcome: Progressing  Goal: Prevent/manage excess moisture  11/11/2024 1311 by Inez Philippe, RN  Outcome: Progressing  11/11/2024 1310 by Inez Philippe, RN  Outcome: Progressing  11/11/2024 1305 by Inez Philippe, RN  Outcome: Progressing  11/11/2024 1302 by Inez Philippe, RN  Outcome: Progressing  11/11/2024 1301 by Inez Philippe, RN  Outcome: Progressing  11/11/2024 1208 by Inez Philippe, RN  Outcome: Progressing  11/11/2024 1152 by Inez Philippe, RN  Outcome: Progressing  Goal: Prevent/minimize sheer/friction injuries  11/11/2024 1311 by Inez Philippe, RN  Outcome: Progressing  11/11/2024 1310 by Inez Philippe, RN  Outcome: Progressing  11/11/2024 1305 by Inez Philippe, RN  Outcome: Progressing  11/11/2024 1302 by Inez Philippe, RN  Outcome: Progressing  11/11/2024 1301 by Inez Philippe RN  Outcome: Progressing  11/11/2024 1152 by Inez Philippe RN  Outcome: Progressing  Goal: Promote/optimize nutrition  11/11/2024 1311 by Inez Philippe, RN  Outcome:  Progressing  11/11/2024 1310 by Inez Philippe, RN  Outcome: Progressing  11/11/2024 1305 by Inez Philippe, RN  Outcome: Progressing  11/11/2024 1302 by Inez Philippe, RN  Outcome: Progressing  11/11/2024 1301 by Inez Philippe, RN  Outcome: Progressing  11/11/2024 1152 by Inez Philippe, RN  Outcome: Progressing  Goal: Promote skin healing  11/11/2024 1311 by Inez Philippe, RN  Outcome: Progressing  11/11/2024 1310 by Inez Philippe, RN  Outcome: Progressing  11/11/2024 1305 by Inez Philippe, RN  Outcome: Progressing  11/11/2024 1302 by Inez Philippe, RN  Outcome: Progressing  11/11/2024 1301 by Inez Philippe, RN  Outcome: Progressing  11/11/2024 1152 by Inez Philippe, RN  Outcome: Progressing     Problem: Skin  Goal: Participates in plan/prevention/treatment measures  11/11/2024 1311 by Inez Philippe, RN  Outcome: Progressing  11/11/2024 1310 by Inez Philippe, RN  Outcome: Progressing  11/11/2024 1305 by Inez Philippe, RN  Outcome: Progressing  11/11/2024 1302 by Inez Philippe, RN  Outcome: Progressing  11/11/2024 1301 by Inez Philippe, RN  Outcome: Progressing  11/11/2024 1208 by Inez Philippe, RN  Outcome: Progressing  11/11/2024 1152 by Inez Philippe, RN  Outcome: Progressing     Problem: Skin  Goal: Prevent/manage excess moisture  11/11/2024 1311 by Inez Philippe, RN  Outcome: Progressing  11/11/2024 1310 by Inez Philippe, RN  Outcome: Progressing  11/11/2024 1305 by Inez Philippe, RN  Outcome: Progressing  11/11/2024 1302 by Inez Philippe, RN  Outcome: Progressing  11/11/2024 1301 by Inez Philippe, RN  Outcome: Progressing  11/11/2024 1208 by Inez Philippe, RN  Outcome: Progressing  11/11/2024 1152 by Inez Philippe, RN  Outcome: Progressing     Problem: Skin  Goal: Prevent/minimize sheer/friction injuries  11/11/2024 1311 by Cinda Philippe, RN  Outcome: Progressing  11/11/2024 1310 by Inez Philippe, RN  Outcome: Progressing  11/11/2024 1305 by Inez Philippe, RN  Outcome: Progressing  11/11/2024 1302 by Inez Philippe, RN  Outcome: Progressing  11/11/2024 1301 by Inez Flanagan  Jose Martin, RN  Outcome: Progressing  11/11/2024 1152 by Inez Philippe RN  Outcome: Progressing     Problem: Skin  Goal: Promote/optimize nutrition  11/11/2024 1311 by Inez Philippe, RN  Outcome: Progressing  11/11/2024 1310 by Inez Philippe, RN  Outcome: Progressing  11/11/2024 1305 by Inez Philippe, RN  Outcome: Progressing  11/11/2024 1302 by Inez Philippe, RN  Outcome: Progressing  11/11/2024 1301 by Inez Philippe, RN  Outcome: Progressing  11/11/2024 1152 by Inez Philippe, RN  Outcome: Progressing     Problem: Skin  Goal: Promote skin healing  11/11/2024 1311 by Inez Philippe, RN  Outcome: Progressing  11/11/2024 1310 by Inez Philippe, RN  Outcome: Progressing  11/11/2024 1305 by Inez Philippe, RN  Outcome: Progressing  11/11/2024 1302 by Inez Philippe, RN  Outcome: Progressing  11/11/2024 1301 by Inez Philippe, RN  Outcome: Progressing  11/11/2024 1152 by Inez Philippe RN  Outcome: Progressing   The patient's goals for the shift include sleep    The clinical goals for the shift include safety to be maintained    Over the shift, the patient did not make progress toward the following goals. Barriers to progression include PROMOTE REST. Recommendations to address these barriers include PROMOTE GOOD NUTRITION

## 2024-11-11 NOTE — PROGRESS NOTES
"Antonio Willis is a 79 y.o. male on day 6 of admission presenting with Pneumonia of right upper lobe due to infectious organism.    Subjective   Symptoms (0 - 10, Best to Worst)  [place] Symptom Assessment System  0-10 (Numeric) Pain Score: 0 - No pain  Still delirious with fluctuating mentation. Vacillates between being able to cooperate in simple interview to confused and mildly hyperactive.          Objective     Physical Exam    Physical Exam  Constitutional:       Comments: Confused, picking at blanket. Easily redirectable but trying to get out of bed though too weak to do so effectively.   HENT:      Head: Normocephalic and atraumatic.   Eyes:      Extraocular Movements: Extraocular movements intact.      Pupils: Pupils are equal, round, and reactive to light.   Cardiovascular:      Rate and Rhythm: Normal rate and regular rhythm.      Heart sounds: Normal heart sounds.   Pulmonary:      Effort: Pulmonary effort is normal.      Breath sounds: Normal breath sounds.   Abdominal:      General: Abdomen is flat. Bowel sounds are normal. There is no distension.      Palpations: Abdomen is soft.      Tenderness: There is no abdominal tenderness.   Musculoskeletal:         General: No swelling.      Cervical back: Normal range of motion and neck supple.   Skin:     General: Skin is warm and dry.   Neurological:      Mental Status: He is disoriented.      Comments: Confused, delirious. Visual hallucinations. Redirectable.      Last Recorded Vitals  Blood pressure 114/71, pulse 65, temperature 36.2 °C (97.2 °F), temperature source Temporal, resp. rate 19, height 1.651 m (5' 5\"), weight 76.7 kg (169 lb), SpO2 94%.  Intake/Output last 3 Shifts:  I/O last 3 completed shifts:  In: 1695 (22.1 mL/kg) [P.O.:300; I.V.:1095 (14.3 mL/kg); IV Piggyback:300]  Out: 1750 (22.8 mL/kg) [Urine:1750 (0.6 mL/kg/hr)]  Weight: 76.7 kg     Wt Readings from Last 10 Encounters:   11/05/24 76.7 kg (169 lb)   10/10/24 76.7 kg (169 lb) "   10/02/24 76.7 kg (169 lb)   09/18/24 75.8 kg (167 lb)   08/25/24 75.8 kg (167 lb)   06/13/24 79.9 kg (176 lb 1.6 oz)   08/15/23 79 kg (174 lb 0.9 oz)   07/20/22 79.8 kg (176 lb)   11/08/21 80.7 kg (178 lb)   07/21/21 78 kg (172 lb 0.8 oz)         Relevant Results  Scheduled medications  cholecalciferol, 1,000 Units, oral, Daily  famotidine, 10 mg, oral, q48h  heparin (porcine), 5,000 Units, subcutaneous, q8h PAULETTE  ipratropium-albuteroL, 3 mL, nebulization, TID  melatonin, 4.5 mg, oral, q PM  methylPREDNISolone sodium succinate (PF), 10 mg, intravenous, Daily  OLANZapine, 2.5 mg, oral, Nightly  oxygen, , inhalation, Continuous - Inhalation  piperacillin-tazobactam, 2.25 g, intravenous, q6h  polyethylene glycol, 17 g, oral, Daily  rosuvastatin, 10 mg, oral, Nightly  sodium bicarbonate, 650 mg, oral, TID  tamsulosin, 0.4 mg, oral, Nightly      Continuous medications     PRN medications  PRN medications: acetaminophen, ipratropium-albuteroL, OLANZapine, ondansetron **OR** ondansetron    ECG 12 lead    Result Date: 11/9/2024  Atrial-paced rhythm with prolonged AV conduction Right bundle branch block Abnormal ECG When compared with ECG of 02-OCT-2024 10:58, (unconfirmed) No significant change was found See ED provider note for full interpretation and clinical correlation Confirmed by Rosa Rocha ([id number]) on 11/9/2024 11:48:27 AM    XR chest 1 view    Result Date: 11/7/2024  Interpreted By:  Marek Fenton, STUDY: XR CHEST 1 VIEW   INDICATION: Signs/Symptoms:worsening hypoxia.   COMPARISON: November 5, 2024   ACCESSION NUMBER(S): [id number]   ORDERING CLINICIAN: ALFREDA HORN   FINDINGS: Cardiomegaly with pacemaker.   Interstitial changes throughout both lungs right worse than left favoring chronic interstitial disease.   Overall appearance unchanged from prior.         Interstitial changes throughout both lungs right worse than left favoring chronic interstitial disease.   Overall appearance unchanged from prior.    Signed by: Marek Fenton 11/7/2024 11:36 AM Dictation workstation:   [id number]    CT abdomen pelvis wo IV contrast    Result Date: 11/6/2024  Interpreted By:  Chase Rivera, STUDY: CT ABDOMEN PELVIS WO IV CONTRAST;  11/6/2024 2:59 pm   INDICATION: Signs/Symptoms:nephrolithiasis and history ogf Prostate carcinoma.     COMPARISON: 07/26/2023   ACCESSION NUMBER(S): [id number]   ORDERING CLINICIAN: YUDELKA HANKINS   TECHNIQUE: CT of the abdomen and pelvis was performed. Contiguous axial images were obtained at 3 mm slice thickness through the abdomen and pelvis. Coronal and sagittal reconstructions at 3 mm slice thickness were performed.  No intravenous contrast was administered.   FINDINGS: Please note that the evaluation of vessels, lymph nodes and organs is limited without intravenous contrast.   LOWER CHEST: The included lung bases demonstrate bibasilar bronchiectasis. Bibasilar infiltrates and interstitial thickening. Findings have progressed slightly since the prior exam. Cardiac pacer.   ABDOMEN:   LIVER: Grossly unremarkable. No definite focal liver lesions.   BILE DUCTS: No bile duct dilatation.   GALLBLADDER: Suboptimally distended and evaluated. No definite gallstones.   PANCREAS: Unremarkable pancreas.   SPLEEN: Unremarkable spleen.   ADRENAL GLANDS: Grossly unremarkable.   KIDNEYS AND URETERS: Cortical thinning of the left kidney with severe left-sided hydronephrosis and hydroureter. Nonobstructing left renal stones. There is a tiny stone within the dependent portion of the dilated distal left ureter measuring approximately 4.5 mm. Also another punctate 2 mm stone near the left UVJ. The larger previously noted distal left ureteral stone is no longer seen.   PELVIS:   BLADDER: Grossly unremarkable.   REPRODUCTIVE ORGANS: Calcifications within the prostate.       ABDOMINAL WALL: Fat containing left inguinal hernia.   BONES: Discogenic degenerative changes. Scoliosis.   No ascites.No retroperitoneal  adenopathy.       1.  Persistent severe left-sided hydronephrosis and hydroureter with cortical thinning of the left kidney. However previously noted larger distal left ureteral stone has resolved with residual much smaller 4 mm stone in the distal left ureter as described. 2. Worsening bibasilar bronchiectasis and interstitial thickening and ground-glass infiltrates.     MACRO: None   Signed by: Chase Rivera 11/6/2024 4:17 PM Dictation workstation:   [id number]    XR chest 2 views    Result Date: 11/5/2024  Interpreted By:  Pepe Scruggs, STUDY: XR CHEST 2 VIEWS;  11/5/2024 7:00 pm   INDICATION: Signs/Symptoms:cough.     COMPARISON: Radiographs of the chest dated 08/25/2024.   ACCESSION NUMBER(S): [id number]   ORDERING CLINICIAN: GEORGE LOPEZ   FINDINGS: PA and lateral radiographs of the chest were provided.   Dual lead left subclavian pacemaker is in place.   CARDIOMEDIASTINAL SILHOUETTE: Cardiomediastinal silhouette is mildly enlarged, similar in appearance to prior exam.   LUNGS: There is redemonstration of the interstitial prominence in the lungs bilaterally, likely representing component of chronic interstitial lung disease, with some increase in airspace opacities in the right upper lung compared to prior study on 08/24/2024. No evidence of pneumothorax or sizable pleural effusion.   ABDOMEN: No remarkable upper abdominal findings.   BONES: No acute osseous changes.       1.  Somewhat increased interstitial markings in the right upper lung compared to prior exam on 08/25/2024, possibly representing developing airspace infiltrate. 2. Prominence of the interstitial markings in the lungs bilaterally, likely representing component of chronic interstitial lung disease.   MACRO: None   Signed by: Pepe Scruggs 11/5/2024 7:12 PM Dictation workstation:   [id number]     Results for orders placed or performed during the hospital encounter of 11/05/24 (from the past 24 hours)   POCT GLUCOSE    Result Value Ref Range    POCT Glucose 75 74 - 99 mg/dL   CBC   Result Value Ref Range    WBC 9.0 4.4 - 11.3 x10*3/uL    nRBC 0.0 0.0 - 0.0 /100 WBCs    RBC 3.44 (L) 4.50 - 5.90 x10*6/uL    Hemoglobin 11.0 (L) 13.5 - 17.5 g/dL    Hematocrit 35.2 (L) 41.0 - 52.0 %     (H) 80 - 100 fL    MCH 32.0 26.0 - 34.0 pg    MCHC 31.3 (L) 32.0 - 36.0 g/dL    RDW 19.1 (H) 11.5 - 14.5 %    Platelets 103 (L) 150 - 450 x10*3/uL   Basic Metabolic Panel   Result Value Ref Range    Glucose 81 74 - 99 mg/dL    Sodium 148 (H) 136 - 145 mmol/L    Potassium 5.0 3.5 - 5.3 mmol/L    Chloride 120 (H) 98 - 107 mmol/L    Bicarbonate 20 (L) 21 - 32 mmol/L    Anion Gap 13 10 - 20 mmol/L    Urea Nitrogen 71 (H) 6 - 23 mg/dL    Creatinine 3.87 (H) 0.50 - 1.30 mg/dL    eGFR 15 (L) >60 mL/min/1.73m*2    Calcium 8.8 8.6 - 10.3 mg/dL                      Assessment/Plan   IMP:   80 yo M with history of stable MS, CKD4, history of CVA/prostate CA s/p radiation, BPH, PPM in situ, interstitial lung disease in current smoker presenting with acute on chronic respiratory failure. Currently being managed for copd exacerbation, bacterial PNA and acute pulmonary edema. Goals are optimizing patient for discharge, possibly to SNF and then return home as long as possible. Controlling delirium is priority. She is aware of his decline but does not fully grasp how frail he is. Wife is coming to terms with the fact that she cannot take care of her . Plan is for DC to SNF and then probably LTC. Palliative is assisting with symptom management.     11/11/24: Conversation with Wife, Emma, and Daiana winter:  We discussed PEG tube feedings at length and while they might secure nutrition but does not fix the root problem of an ineffective swallow reflex. We agreed that we should promote oral nutrition as much as possible.  She is coming to terms with the fact that she cannot provide enough care for him at home. She has her own health issues w/ multiple joint  replacements and would not be able to lift him independently. They are seeking SNF and then LTC once that insurance begins paying out at the end of November.           Plan:  - Hospice informational visit possibly tomorrow.   - Continue current treatment plan  - Changed code status to DNR/DNI per discussion with wife  - Will schedule olanzapine 2.5mg nightly.   - Will continue to follow.      - Recommend Delirium/Dementia Precautions:       - Minimize use of benzos, opiates, anticholinergics, as these may worsen mental status       - Would use caution with narcotic pain medications       - Would still adequately controlling pain, as uncontrolled pain is also a risk factor for delirium       - Reinforce sleep hygiene; encourage patient to stay awake during the day       - Keep curtains/blinds open during the day and closed at night.       - Would recommend reorienting/redirecting patient as much as possible,        - Aim for consistent staffing, familiar objects, avoiding bright lights and loud noises, etc.       - Can consider melatonin at night before bed.           Provider estimate of survival: 6+ months Barring unexpected event. I would not be surprised with an event within six months.   Patient Prognostic Awareness: Yes - incongruent with provider estimation     Patient/proxy preference for information  Prefers full information     Goals of Care  DNR DNI per home orders  Provided paper copy to wife.      Is the patient hospice-eligible?   Yes  Was a discussion held re hospice services?   yes  Was a decision made re hospice services?  No, Deferring for now.     Other Palliative Support          I spent 50 minutes in the professional and overall care of this patient.      Marek Chao MD

## 2024-11-11 NOTE — PROGRESS NOTES
11/11/2024 1:33 PM Message left for admissions at [facility] to see if patient is accepted. Qiana CORDERO

## 2024-11-11 NOTE — DOCUMENTATION CLARIFICATION NOTE
PATIENT:               DOMINGO SERRANO  ACCT #:                  [id number]  MRN:                       [id number]  :                       1945  ADMIT DATE:       2024 8:13 PM  DISCH DATE:  RESPONDING PROVIDER #:        [id number]          PROVIDER RESPONSE TEXT:    Decreased platelets not requiring treatment or evaluation    CDI QUERY TEXT:    Clarification        Instruction:    Based on your assessment of the patient and the clinical information, please provide the requested documentation by clicking on the appropriate radio button and enter any additional information if prompted.    Question: Is there a diagnosis indicative of the lab values    When answering this query, please exercise your independent professional judgment. The fact that a question is being asked, does not imply that any particular answer is desired or expected.    The patient's clinical indicators include:  Clinical Information: 79 year old man with SOB and weakness. Pt's diagnoses include ILD, acute on chronic hypoxic respiratory failure, metabolic encephalopathy, and pneumonia. Pt's PMH includes MS, CKD 4/5, prostate cancer s/p radiation, and covid.    Clinical Indicators:  Labs    Platelets  117    Platelets   94    Platelets  104  11/10   Platelets   93      Platelets   103    Treatment: lab monitoring    Risk Factors: age, hx prostate cancer  Options provided:  -- Thrombocytopenia is clinically significant and required monitoring  -- Decreased platelets not requiring treatment or evaluation  -- Other - I will add my own diagnosis  -- Refer to Clinical Documentation Reviewer    Query created by: Lizeth Lopez on 2024 2:31 PM      Electronically signed by:  YUDELKA HANKINS MD 2024 4:02 PM

## 2024-11-11 NOTE — ASSESSMENT & PLAN NOTE
79-year-old gentleman with history of multiple sclerosis, without significant neurological deficit, history of previous permanent pacemaker placement, hypertension, recent COVID infection about 2 months ago, interstitial lung disease, chronic smoking which he quit about a month ago, history of prostate carcinoma in the past s/p radiation treatment and history of BPH and nephrolithiasis, patient is on home oxygen for interstitial lung disease, presents with increasing shortness of breath and cough.  He normally uses 2 to 3 L of nasal cannula oxygen at home.  His chest x-ray is consistent with interstitial lung disease, although he is admitted with a diagnosis of pneumonia but chest x-ray revealed no evidence of consolidation.  He has had a high-resolution CT in September 2024 which is consistent with interstitial lung disease and significant emphysema.    1.  Acute exacerbation of chronic lung disease with wheezing and shortness of breath and questionable pneumonic infiltrate and history of interstitial lung disease.  White cell count is normal, chest x-ray revealed interstitial lung disease without definite consolidation.  Patient was on IV ceftriaxone and azithromycin, changed to Zosyn on 11/8/2024.  He does have a history of smoking and there is some element of COPD exacerbation therefore , patient  is on Solu-Medrol tapering doses which can be switched to oral prednisone soon.   Pulmonary on board.  To continue with oxygen to maintain his saturation above 92%.  Oxygen increased to 5 L nasal cannula.    2.  Low normal blood pressures,Will monitor blood pressure closely  Blood pressures have improved and normalized now.    3.  History of COVID infection few months ago currently has no symptoms related to COVID.    4.  Chronic kidney disease stage IV with a serum potassium of 5.6 and serum creatinine of 4.6 on admission.  Hyperkalemia has been treated and repeat potassium is 5.0 today.  Patient follows with   [facility] as an outpatient.  No significant change in renal function, remains elevated BUN and creatinine.  Nephrology on board, there is some improvement in creatinine to 3.87 today, still remains hypernatremic and hyperchloremic and patient was on D5 at 50 cc an hour over the weekend as per recommendation of nephrology.    5.  Previous history of prostate carcinoma s/p radiation and history of BPH and nephrolithiasis, patient had taken a second urological opinion at [facility] and  and has been following with Dr. Buckner,at  .   CT of abdomen &pelvis  on 11/7/24:  Persistent severe left-sided hydronephrosis and hydroureter with cortical thinning of the left kidney. However previously noted larger distal left ureteral stone has resolved with residual much smaller 4 mm stone in the distal left ureter as described. 2. Worsening bibasilar bronchiectasis and interstitial thickening and ground-glass infiltrates.     6.  Hyperlipidemia continue with current medications    Reviewed all labs and imaging studies and discussed the plan of treatment with patient in detail.  Discussed with patient's family, his daughter was in the room who stated that patient's wife would like him to go to a skilled nursing facility, patient to be evaluated by PT OT and and proceed with the evaluation for skilled nursing facility.

## 2024-11-11 NOTE — CARE PLAN
Problem: Skin  Goal: Decreased wound size/increased tissue granulation at next dressing change  11/11/2024 1208 by Inez Philippe RN  Outcome: Progressing  11/11/2024 1152 by Inez Philippe RN  Outcome: Progressing  Goal: Participates in plan/prevention/treatment measures  11/11/2024 1208 by Inez Philippe RN  Outcome: Progressing  11/11/2024 1152 by Inez Philippe RN  Outcome: Progressing  Goal: Prevent/manage excess moisture  11/11/2024 1208 by Inez Philippe RN  Outcome: Progressing  11/11/2024 1152 by Inez Philippe RN  Outcome: Progressing  Goal: Prevent/minimize sheer/friction injuries  Outcome: Progressing  Goal: Promote/optimize nutrition  Outcome: Progressing  Goal: Promote skin healing  Outcome: Progressing   The patient's goals for the shift include sleep    The clinical goals for the shift include patient will be able remain free of harm and injuries by the end of the shift    Over the shift, the patient did not make progress toward the following goals. Barriers to progression include . Recommendations to address these barriers include .

## 2024-11-11 NOTE — PROGRESS NOTES
11/11/24 1408   Discharge Planning   Expected Discharge Disposition SNF     Patient admitted for SOB- acute exacerbation of chronic lung disease- PNA  Palliative med following- no hospice at this time    Meredith TYLER spoke to wife, plan for SNF at discharge  Referrals placed  Need FOC and auth, NEED OT TO START AUTH    ADOD 1-3 days  BARRIERS FOC, AUTH, OT eval, med cleared  DISPO SNF

## 2024-11-11 NOTE — PROGRESS NOTES
"Subjective Data:  BP improved   Palliative care consulted-  plan for hospice evaluation tomorrow.   He is now a DNR/DNI- No plans for feeding tube placement at this time    today   Not currently on telemetry.     Overnight Events:    N/A     Objective Data:  Last Recorded Vitals:  Vitals:    11/11/24 0729 11/11/24 0812 11/11/24 1146 11/11/24 1627   BP: 103/66  114/71 112/58   BP Location: Right arm  Right arm Right arm   Patient Position: Lying  Lying Sitting   Pulse: 61  65 71   Resp: 15  19 19   Temp: 35.8 °C (96.4 °F) 36.3 °C (97.3 °F) 36.2 °C (97.2 °F) 36.3 °C (97.3 °F)   TempSrc: Temporal Temporal Temporal Temporal   SpO2: 91% 93% 94% 95%   Weight:       Height:           Last Labs:  LABS:  CMP:  Results from last 7 days   Lab Units 11/11/24  0512 11/10/24  0719 11/09/24  0953 11/08/24  0810 11/07/24  0516 11/06/24  1819 11/06/24  1124 11/05/24  1828   SODIUM mmol/L 148* 146* 148* 151* 149* 149* 150* 148*   POTASSIUM mmol/L 5.0 5.0 5.0 4.9 5.3 4.6 4.3 5.6*   CHLORIDE mmol/L 120* 120* 120* 123* 126* 124* 126* 122*   CO2 mmol/L 20* 17* 19* 18* 15* 16* 17* 19*   ANION GAP mmol/L 13 14 14 15 13 14 11 13   BUN mg/dL 71* 76* 81* 84* 78* 81* 90* 87*   CREATININE mg/dL 3.87* 4.13* 4.57* 4.72* 4.59* 4.70* 4.72* 4.98*   EGFR mL/min/1.73m*2 15* 14* 12* 12* 12* 12* 12* 11*   ALBUMIN g/dL  --  3.3* 3.6 3.5  --   --   --  3.7   ALT U/L  --   --   --  97*  --   --   --  188*   AST U/L  --   --   --  29  --   --   --  130*   BILIRUBIN TOTAL mg/dL  --   --   --  0.5  --   --   --  0.6     CBC:  Results from last 7 days   Lab Units 11/11/24  0512 11/10/24  0720 11/08/24  0810 11/07/24  0516 11/05/24  1828   WBC AUTO x10*3/uL 9.0 12.4* 15.2* 7.3 8.8   HEMOGLOBIN g/dL 11.0* 11.0* 10.4* 10.3* 11.8*   HEMATOCRIT % 35.2* 35.7* 33.3* 33.2* 37.8*   PLATELETS AUTO x10*3/uL 103* 93* 104* 94* 117*   MCV fL 102* 107* 105* 105* 104*     COAG:     ABO: No results found for: \"ABO\"  HEME/ENDO:     CARDIAC:   Results from last 7 days "   Lab Units 11/05/24  1828   TROPHS ng/L 17   BNP pg/mL 127*     Recent Labs     10/31/24  1353 08/29/23  0825 07/30/22  1002 07/26/21  0902   CHOL 105 114 112 118   LDLF  --  55 54 52   LDLCALC 40  --   --   --    HDL 46.8 38.4* 44.8 46.5   TRIG 89 104 67 100      [facility] Results  XR chest 1 view   Final Result        Interstitial changes throughout both lungs right worse than left   favoring chronic interstitial disease.        Overall appearance unchanged from prior.        Signed by: Marek Fenton 11/7/2024 11:36 AM   Dictation workstation:   [id number]      CT abdomen pelvis wo IV contrast   Final Result   1.  Persistent severe left-sided hydronephrosis and hydroureter with   cortical thinning of the left kidney. However previously noted larger   distal left ureteral stone has resolved with residual much smaller 4   mm stone in the distal left ureter as described.   2. Worsening bibasilar bronchiectasis and interstitial thickening and   ground-glass infiltrates.             MACRO:   None        Signed by: Chase Rivera 11/6/2024 4:17 PM   Dictation workstation:   [id number]      XR chest 2 views   Final Result   1.  Somewhat increased interstitial markings in the right upper lung   compared to prior exam on 08/25/2024, possibly representing   developing airspace infiltrate.   2. Prominence of the interstitial markings in the lungs bilaterally,   likely representing component of chronic interstitial lung disease.        MACRO:   None        Signed by: Pepe Scruggs 11/5/2024 7:12 PM   Dictation workstation:   [id number]           Last I/O:  I/O last 3 completed shifts:  In: 1695 (22.1 mL/kg) [P.O.:300; I.V.:1095 (14.3 mL/kg); IV Piggyback:300]  Out: 1750 (22.8 mL/kg) [Urine:1750 (0.6 mL/kg/hr)]  Weight: 76.7 kg     Past Cardiology Tests (Last 3 Years):  EKG:  ECG 12 lead 11/05/2024        Echo:  Transthoracic echo (TTE) complete 10/10/2024   1. Left ventricular ejection fraction is normal, by visual  estimate at 60-65%.   2. Spectral Doppler shows an impaired relaxation pattern of left ventricular diastolic filling.   3. Left ventricular cavity size is decreased.   4. There is normal right ventricular global systolic function.   5. Mild to moderate tricuspid regurgitation visualized.   6. Moderately elevated right ventricular systolic pressure.    Ejection Fractions:  EF   Date/Time Value Ref Range Status   10/10/2024 10:46 AM 63 %      Cath:  No results found for this or any previous visit from the past 1095 days.    Stress Test:  No results found for this or any previous visit from the past 1095 days.    Cardiac Imaging:  No results found for this or any previous visit from the past 1095 days.      Inpatient Medications:  Scheduled medications   Medication Dose Route Frequency    cholecalciferol  1,000 Units oral Daily    famotidine  10 mg oral q48h    heparin (porcine)  5,000 Units subcutaneous q8h PAULETTE    ipratropium-albuteroL  3 mL nebulization TID    melatonin  4.5 mg oral q PM    methylPREDNISolone sodium succinate (PF)  10 mg intravenous Daily    OLANZapine  2.5 mg oral Nightly    oxygen   inhalation Continuous - Inhalation    piperacillin-tazobactam  2.25 g intravenous q6h    polyethylene glycol  17 g oral Daily    rosuvastatin  10 mg oral Nightly    sodium bicarbonate  650 mg oral TID    tamsulosin  0.4 mg oral Nightly     PRN medications   Medication    acetaminophen    ipratropium-albuteroL    OLANZapine    ondansetron    Or    ondansetron     Continuous Medications   Medication Dose Last Rate       Physical Exam:  GENERAL: Confused   SKIN: warm, dry  NECK: no JVD  CARDIAC: Regular rate and rhythm no murmurs  PULMONARY: SOB, rhonchorous breath sounds  On supplemental oxygen via 6 L NC  ABDOMEN: soft, nondistended  EXTREMITIES: no lower extremity edema  NEURO: Confused, moving all extremities      Assessment/Plan   Antonio Willis is a 79 y.o. male with past medical history significant for  hyperlipidemia with statin intolerance, sinus node dysfunction status post dual-chamber pacemaker placement 10/17/2011, bifascicular block, upper GI bleed in the setting of gastric and duodenal ulcers, interstitial lung disease, COPD, chronic hypoxic respiratory failure, chronic tobacco use,  chronic kidney disease, multiple sclerosis. Presented with shortness of breath. Cardiology is consulted for Patient with low BP, blood pressure ranging 86-104mmHG, not an antihypertensive medication and troponin negative and EKG unremarkable.     Patient presents with 2 to 3-day history of increasing shortness of breath, generalized malaise and fatigue, and low oxygen saturation at home. No chest discomfort.     Acute on chronic hypoxic respiratory failure due to interstitial lung disease, COPD exacerbation, possible pneumonia- Now with worsening respiratory status  Asymptomatic hypotension due to volume depletion - resolved  CKD 4  Hyperlipidemia  History of sinus node dysfunction status post pacemaker placement  Bifascicular block  CHRISTIAN on CKD- Nephrology now following.  Hypernatremia      Recommendation:  Plan for hospice meeting tomorrow    Monitor volume status   No further cardiac recommendations at this time   Cont rosuvastatin      Code Status:  DNR and No Intubation      Tobi Merchant, APRN-CNP

## 2024-11-11 NOTE — CARE PLAN
The patient's goals for the shift include sleep    The clinical goals for the shift include patient will be able remain free of harm and injuries by the end of the shift      Problem: Pain - Adult  Goal: Verbalizes/displays adequate comfort level or baseline comfort level  Outcome: Progressing     Problem: Skin  Goal: Decreased wound size/increased tissue granulation at next dressing change  Outcome: Progressing  Goal: Participates in plan/prevention/treatment measures  Outcome: Progressing  Goal: Prevent/manage excess moisture  Outcome: Progressing  Goal: Prevent/minimize sheer/friction injuries  Outcome: Progressing  Goal: Promote/optimize nutrition  Outcome: Progressing  Goal: Promote skin healing  Outcome: Progressing  Flowsheets (Taken 11/11/2024 0014)  Promote skin healing:   Assess skin/pad under line(s)/device(s)   Turn/reposition every 2 hours/use positioning/transfer devices     Problem: Fall/Injury  Goal: Not fall by end of shift  Outcome: Progressing  Goal: Be free from injury by end of the shift  Outcome: Progressing  Goal: Verbalize understanding of personal risk factors for fall in the hospital  Outcome: Progressing  Goal: Verbalize understanding of risk factor reduction measures to prevent injury from fall in the home  Outcome: Progressing  Goal: Use assistive devices by end of the shift  Outcome: Progressing  Goal: Pace activities to prevent fatigue by end of the shift  Outcome: Progressing

## 2024-11-11 NOTE — PROGRESS NOTES
"Antonio Willis is a 79 y.o. male on day 6 of admission presenting with Pneumonia of right upper lobe due to infectious organism.    Subjective   Feels \"okay\".  Denies shortness of breath or pain, but does admit to an occasional cough productive of clear sputum.  Somewhat confused.  On 6 L nasal cannula oxygen.     Objective   Physical Exam  Vitals  Blood pressure 103/66, pulse 61, temperature 36.3 °C (97.3 °F), temperature source Temporal, resp. rate 15, height 1.651 m (5' 5\"), weight 76.7 kg (169 lb), SpO2 93%.  Intake/Output last 3 Shifts:  I/O last 3 completed shifts:  In: 1695 (22.1 mL/kg) [P.O.:300; I.V.:1095 (14.3 mL/kg); IV Piggyback:300]  Out: 1750 (22.8 mL/kg) [Urine:1750 (0.6 mL/kg/hr)]  Weight: 76.7 kg     General-awake, alert and in no acute distress.  Lungs-coarse breath sounds bilaterally without wheezes, rales or rhonchi.  Heart-regular rate and rhythm.  Abdomen-positive bowel sounds.  Extremities-no edema.  Skin-no rashes.    Scheduled medications  cholecalciferol, 1,000 Units, oral, Daily  famotidine, 10 mg, oral, q48h  heparin (porcine), 5,000 Units, subcutaneous, q8h PAULETTE  ipratropium-albuteroL, 3 mL, nebulization, TID  melatonin, 4.5 mg, oral, q PM  methylPREDNISolone sodium succinate (PF), 10 mg, intravenous, Daily  OLANZapine, 2.5 mg, oral, Nightly  oxygen, , inhalation, Continuous - Inhalation  piperacillin-tazobactam, 2.25 g, intravenous, q6h  polyethylene glycol, 17 g, oral, Daily  rosuvastatin, 10 mg, oral, Nightly  sodium bicarbonate, 650 mg, oral, TID  tamsulosin, 0.4 mg, oral, Nightly      Continuous medications     PRN medications  PRN medications: acetaminophen, ipratropium-albuteroL, OLANZapine, ondansetron **OR** ondansetron     Results for orders placed or performed during the hospital encounter of 11/05/24 (from the past 24 hours)   POCT GLUCOSE   Result Value Ref Range    POCT Glucose 116 (H) 74 - 99 mg/dL   POCT GLUCOSE   Result Value Ref Range    POCT Glucose 75 74 - 99 mg/dL "   CBC   Result Value Ref Range    WBC 9.0 4.4 - 11.3 x10*3/uL    nRBC 0.0 0.0 - 0.0 /100 WBCs    RBC 3.44 (L) 4.50 - 5.90 x10*6/uL    Hemoglobin 11.0 (L) 13.5 - 17.5 g/dL    Hematocrit 35.2 (L) 41.0 - 52.0 %     (H) 80 - 100 fL    MCH 32.0 26.0 - 34.0 pg    MCHC 31.3 (L) 32.0 - 36.0 g/dL    RDW 19.1 (H) 11.5 - 14.5 %    Platelets 103 (L) 150 - 450 x10*3/uL   Basic Metabolic Panel   Result Value Ref Range    Glucose 81 74 - 99 mg/dL    Sodium 148 (H) 136 - 145 mmol/L    Potassium 5.0 3.5 - 5.3 mmol/L    Chloride 120 (H) 98 - 107 mmol/L    Bicarbonate 20 (L) 21 - 32 mmol/L    Anion Gap 13 10 - 20 mmol/L    Urea Nitrogen 71 (H) 6 - 23 mg/dL    Creatinine 3.87 (H) 0.50 - 1.30 mg/dL    eGFR 15 (L) >60 mL/min/1.73m*2    Calcium 8.8 8.6 - 10.3 mg/dL      Assessment:  79-year-old man with a history of interstitial lung disease, chronic hypoxia, chronic renal failure, hydronephrosis, MS, COVID-19 infection, pacemaker, GI bleed, prostate cancer and renal and bladder stones, who was admitted with shortness of breath, cough, weakness and hypoxia, and found to have an abnormal chest x-ray suggestive of possible community-acquired pneumonia and/or an acute exacerbation of interstitial lung disease.  There is no bronchospasm.    Recommend:  1.  Continue DuoNeb, Zosyn, Solu-Medrol and subcutaneous heparin.  2.  Wean FiO2 to keep oxygen saturation approximately 92 to 95%; home oxygen evaluation prior to discharge.  3.  Incentive spirometry and Acapella treatment.  4.  Follow-up with his outpatient pulmonologist, Dr. Chacorta Dumont, after discharge.  5.  The patient is DNR/DNI.    Johnnie Spangler MD

## 2024-11-11 NOTE — CARE PLAN
The patient's goals for the shift include sleep    The clinical goals for the shift include patient will be able remain free of harm and injuries by the end of the shift    Over the shift, the patient did not make progress toward the following goals. Barriers to progression include . Recommendations to address these barriers include .

## 2024-11-11 NOTE — PROGRESS NOTES
"Antonio Willis is a 79 y.o. male on day 6 of admission presenting with Pneumonia of right upper lobe due to infectious organism.    Subjective   Patient alert. VSS on 5L nc.    Objective     Physical Exam  Vitals reviewed.   HENT:      Head: Normocephalic.      Right Ear: Tympanic membrane normal.      Nose: Nose normal.      Mouth/Throat:      Mouth: Mucous membranes are moist.   Eyes:      Pupils: Pupils are equal, round, and reactive to light.   Cardiovascular:      Rate and Rhythm: Normal rate.   Pulmonary:      Effort: Pulmonary effort is normal.      Comments: 5L nc  Abdominal:      General: Abdomen is flat.   Genitourinary:     Comments: deferred  Musculoskeletal:         General: Normal range of motion.      Cervical back: Normal range of motion.   Skin:     General: Skin is warm.      Capillary Refill: Capillary refill takes less than 2 seconds.   Neurological:      General: No focal deficit present.      Mental Status: He is alert and oriented to person, place, and time. Mental status is at baseline.   Psychiatric:         Mood and Affect: Mood normal.         Behavior: Behavior normal.         Thought Content: Thought content normal.         Judgment: Judgment normal.         Last Recorded Vitals  Blood pressure 114/71, pulse 65, temperature 36.2 °C (97.2 °F), temperature source Temporal, resp. rate 19, height 1.651 m (5' 5\"), weight 76.7 kg (169 lb), SpO2 94%.  Intake/Output last 3 Shifts:  I/O last 3 completed shifts:  In: 1695 (22.1 mL/kg) [P.O.:300; I.V.:1095 (14.3 mL/kg); IV Piggyback:300]  Out: 1750 (22.8 mL/kg) [Urine:1750 (0.6 mL/kg/hr)]  Weight: 76.7 kg     Relevant Results    Results for orders placed or performed during the hospital encounter of 11/05/24 (from the past 24 hours)   POCT GLUCOSE   Result Value Ref Range    POCT Glucose 75 74 - 99 mg/dL   CBC   Result Value Ref Range    WBC 9.0 4.4 - 11.3 x10*3/uL    nRBC 0.0 0.0 - 0.0 /100 WBCs    RBC 3.44 (L) 4.50 - 5.90 x10*6/uL    Hemoglobin " 11.0 (L) 13.5 - 17.5 g/dL    Hematocrit 35.2 (L) 41.0 - 52.0 %     (H) 80 - 100 fL    MCH 32.0 26.0 - 34.0 pg    MCHC 31.3 (L) 32.0 - 36.0 g/dL    RDW 19.1 (H) 11.5 - 14.5 %    Platelets 103 (L) 150 - 450 x10*3/uL   Basic Metabolic Panel   Result Value Ref Range    Glucose 81 74 - 99 mg/dL    Sodium 148 (H) 136 - 145 mmol/L    Potassium 5.0 3.5 - 5.3 mmol/L    Chloride 120 (H) 98 - 107 mmol/L    Bicarbonate 20 (L) 21 - 32 mmol/L    Anion Gap 13 10 - 20 mmol/L    Urea Nitrogen 71 (H) 6 - 23 mg/dL    Creatinine 3.87 (H) 0.50 - 1.30 mg/dL    eGFR 15 (L) >60 mL/min/1.73m*2    Calcium 8.8 8.6 - 10.3 mg/dL              Assessment/Plan   Assessment & Plan  Pneumonia of right upper lobe due to infectious organism    Acute pulmonary edema    Acute on chronic hypoxic respiratory failure (Multi)    Interstitial lung disease (Multi)    Acute metabolic encephalopathy    Antonio Willis is a 78 y/o Male PMH: MS, pacemaker, HTN, recent hx covid x 2 months ago, hx prostate ca, chronic hydronephrosis of left kidney, ILD,  presenting to the ED with complaints of  increasing shortness of breath. Patient subsequently admitted for further monitoring of above symptoms.    #pneumonia rt upper lobe  #hx ILD  -Continues on 5L nc  -Continues on IV zosyn  -Continues on IV Solu-Medrol 10 mg IV daily  - Pulmonology following  -Palliative following  -PT/OT/Spch: recommending mod    #CHRISTIAN on CKD 5  -monitor  -Nephrology following      #DVT prophylaxis  -scds  -heparin subcutaneous    DC plan:  DC to facility when medically stable. PT/OT recommending mod. Referrals sent pending acceptance. Interdisciplinary rounding completed with Attending Provider, Bedside RN and TCC.  Labs, results and plan of care discussed and reviewed with Dr. Kerns.         I spent 20 minutes in the professional and overall care of this patient.  Julissa Bal, APRN-CNP

## 2024-11-11 NOTE — CARE PLAN
The patient's goals for the shift include sleep    The clinical goals for the shift include safety to be maintained    Over the shift, the patient did not make progress toward the following goals. Barriers to progression include  promote rest

## 2024-11-11 NOTE — CARE PLAN
Problem: Skin  Goal: Decreased wound size/increased tissue granulation at next dressing change  11/11/2024 1310 by Inez Philippe, RN  Outcome: Progressing  11/11/2024 1305 by Inez Philippe, RN  Outcome: Progressing  11/11/2024 1302 by Inez Philippe, RN  Outcome: Progressing  11/11/2024 1301 by Inez Philippe, RN  Outcome: Progressing  11/11/2024 1216 by Inez Philippe, RN  Outcome: Progressing  11/11/2024 1208 by Inez Philippe, RN  Outcome: Progressing  11/11/2024 1152 by Inez Philippe, RN  Outcome: Progressing  Goal: Participates in plan/prevention/treatment measures  11/11/2024 1310 by Inez Philippe, RN  Outcome: Progressing  11/11/2024 1305 by Inez Philippe, RN  Outcome: Progressing  11/11/2024 1302 by Inez Philippe, RN  Outcome: Progressing  11/11/2024 1301 by Inez Philippe, RN  Outcome: Progressing  11/11/2024 1208 by Inez Philippe, RN  Outcome: Progressing  11/11/2024 1152 by Inez Philippe, RN  Outcome: Progressing  Goal: Prevent/manage excess moisture  11/11/2024 1310 by Inez Philippe, RN  Outcome: Progressing  11/11/2024 1305 by Inez Philippe, RN  Outcome: Progressing  11/11/2024 1302 by Inez Philippe, RN  Outcome: Progressing  11/11/2024 1301 by Inez Philippe, RN  Outcome: Progressing  11/11/2024 1208 by Inez Philippe, RN  Outcome: Progressing  11/11/2024 1152 by Inez Philippe, RN  Outcome: Progressing  Goal: Prevent/minimize sheer/friction injuries  11/11/2024 1310 by Inez Philippe, RN  Outcome: Progressing  11/11/2024 1305 by Inez Philippe, RN  Outcome: Progressing  11/11/2024 1302 by Inez Philippe, RN  Outcome: Progressing  11/11/2024 1301 by Inez Philippe, RN  Outcome: Progressing  11/11/2024 1152 by Inez Philippe, RN  Outcome: Progressing  Goal: Promote/optimize nutrition  11/11/2024 1310 by Inez Philippe, RN  Outcome: Progressing  11/11/2024 1305 by Inez Philippe RN  Outcome: Progressing  11/11/2024 1302 by Inez Philippe RN  Outcome: Progressing  11/11/2024 1301 by Inez Philippe RN  Outcome: Progressing  11/11/2024 1152 by Inez Philippe RN  Outcome:  Progressing  Goal: Promote skin healing  11/11/2024 1310 by Inez Philippe RN  Outcome: Progressing  11/11/2024 1305 by Inez Philippe RN  Outcome: Progressing  11/11/2024 1302 by Inez Philippe RN  Outcome: Progressing  11/11/2024 1301 by Inez Philippe RN  Outcome: Progressing  11/11/2024 1152 by Inez Philippe RN  Outcome: Progressing   The patient's goals for the shift include sleep    The clinical goals for the shift include safety to be maintained    Over the shift, the patient did not make progress toward the following goals. Barriers to progression include . Recommendations to address these barriers include .

## 2024-11-11 NOTE — PROGRESS NOTES
Physical Therapy    Physical Therapy Evaluation & Treatment    Patient Name: Antonio Willis  MRN: [id number]  Today's Date: 11/11/2024   Time Calculation  Start Time: 1015  Stop Time: 1034  Time Calculation (min): 19 min  605/605-A    Assessment/Plan   PT Assessment  PT Assessment Results: Decreased strength, Decreased endurance, Impaired balance, Decreased mobility, Decreased safety awareness, Decreased cognition  Rehab Prognosis: Fair  Evaluation/Treatment Tolerance: Patient limited by fatigue  Medical Staff Made Aware: Yes  End of Session Communication: Bedside nurse  Assessment Comment: Pt requires 24 hour hands on assist for bed mobility, transfers and gait.  Pt requires cues for safety and technique.  Pt is easily fatigued.  Pt would benefit from continued, moderate intensity PT to improve independence with all functional mobility.  End of Session Patient Position: Bed, 3 rail up, Alarm on  IP OR SWING BED PT PLAN  Inpatient or Swing Bed: Inpatient  PT Plan  Treatment/Interventions: Bed mobility, Transfer training, Balance training, Strengthening, Endurance training, Therapeutic exercise, Therapeutic activity, Postural re-education (Pt education)  PT Plan: Ongoing PT  PT Frequency: 3 times per week  PT Discharge Recommendations: Moderate intensity level of continued care  PT Recommended Transfer Status: Total assist  PT - OK to Discharge: OK to discharge from acute PT services to the next level of care when cleared by the medical team.    Current Problem:  Patient Active Problem List   Diagnosis    Abnormal renal function    Adenocarcinoma of prostate (Multi)    Anemia due to blood loss    Benign prostatic hyperplasia    Calculus in urethra    Dyslipidemia    Gastroesophageal reflux disease    Hematuria    Hyperlipidemia    Melena    Multiple sclerosis (Multi)    Presence of cardiac pacemaker    Sinus node dysfunction (Multi)    Duodenal ulcer    Peptic ulcer disease    Stomach ulcer    Ureteric stone     Tobacco use    History of hemiparesis    Urinary tract infection in elderly patient    MS (multiple sclerosis) (Multi)    Upper extremity weakness    Pneumonia of right upper lobe due to infectious organism    Acute pulmonary edema    Acute on chronic hypoxic respiratory failure (Multi)    Interstitial lung disease (Multi)    Acute metabolic encephalopathy       Subjective     General Visit Information:  General  Reason for Referral: Difficulty walking.  79 y.o. male presenting with increasing shortness of breath and cough without any sputum production. history of multiple sclerosis, He has a history of COVID infection few months ago but currently has no COVID-related symptoms.  Referred By: Dr. Kerns  Prior to Session Communication: Bedside nurse  Patient Position Received: Bed, 3 rail up, Alarm on    Home Living:  Home Living  Home Adaptive Equipment: Walker rolling or standard, Wheelchair-power  Home Living Comments: Pt's family at bedside assisted with providing home set up and prior level of function.  Pt resides in deepali apartment with his wife, no stairs, walk in shower with grab bar.    Prior Level of Function:  Prior Function Per Pt/Caregiver Report  Prior Function Comments: Pt sleeps in an adjustable bed with bed rail, prn assist for bed mobility, assist for stand pivot transfers to power wheelchair, assist for ADLs.  Home health aide 4 hour 4x's/wk.  Pt is active with out patient PT.  Pt is non-ambulatory and is working with PT on ambulation.    Precautions:  Precautions  Medical Precautions: Fall precautions    Vital Signs:     Objective     Pain:  Pain Assessment  Pain Assessment: 0-10  0-10 (Numeric) Pain Score: 0 - No pain    Cognition:  Cognition  Overall Cognitive Status:  (Pt is confused and family reports he is hallucinating.)  Orientation Level:  (Oriented to person, place and time.)    General Assessments:      Activity Tolerance  Endurance: Decreased tolerance for upright activites      Strength  Strength Comments: B knee ext 3+/5, bilateral ankles 3-/5           Static Sitting Balance  Static Sitting-Balance Support:  (min to max assist, retropulsive, cues for UE support to maintain seated balance.)       Functional Assessments:     Bed Mobility  Bed Mobility:  (supine to sit max assist with head of bed elevated.  Sit to supine max assist of 2, supine scoot dependent assist of 2.)  Transfers  Transfer:  (Not tested due to pt fatigued after sitting up at edge of bed for several minutes and requesting to lay down.  Also not safe to stand at this time due to poor seated balance.)             Extremity/Trunk Assessments:        RLE   RLE :  (R LE ROM WFL)  LLE   LLE :  (L LE ROM WFL)    Treatments:           Bed Mobility  Bed Mobility:  (supine to sit max assist with head of bed elevated.  Sit to supine max assist of 2, supine scoot dependent assist of 2.)     Transfers  Transfer:  (Not tested due to pt fatigued after sitting up at edge of bed for several minutes and requesting to lay down.  Also not safe to stand at this time due to poor seated balance.)       Outcome Measures:  [facility] Basic Mobility  Turning from your back to your side while in a flat bed without using bedrails: A lot  Moving from lying on your back to sitting on the side of a flat bed without using bedrails: A lot  Moving to and from bed to chair (including a wheelchair): Total  Standing up from a chair using your arms (e.g. wheelchair or bedside chair): Total  To walk in hospital room: Total  Climbing 3-5 steps with railing: Total  Basic Mobility - Total Score: 8                            Goals:  Encounter Problems       Encounter Problems (Active)       PT Problem       PT Goal 1 (Progressing)       Start:  11/11/24    Expected End:  11/25/24       Pt able to perform bed mobility with mod assist.           PT Goal 2 (Progressing)       Start:  11/11/24    Expected End:  11/25/24       Improve dynamic seated balance to min  assist to prepare for transfers to bedside chair.             PT Goal 3 (Progressing)       Start:  11/11/24    Expected End:  11/25/24       Pt able to complete all transfers with max assist of 1-2.                 Education Documentation  Precautions, taught by Izabel Abad, PT at 11/11/2024 12:10 PM.  Learner: Patient  Readiness: Acceptance  Method: Explanation  Response: Needs Reinforcement    Mobility Training, taught by Izabel Abad, PT at 11/11/2024 12:10 PM.  Learner: Patient  Readiness: Acceptance  Method: Explanation  Response: Needs Reinforcement    Education Comments  No comments found.

## 2024-11-11 NOTE — DOCUMENTATION CLARIFICATION NOTE
"    PATIENT:               DOMINGO SERRANO  ACCT #:                  [id number]  MRN:                       [id number]  :                       1945  ADMIT DATE:       2024 8:13 PM  DISCH DATE:  RESPONDING PROVIDER #:        [id number]          PROVIDER RESPONSE TEXT:    Gram negative pneumonia    CDI QUERY TEXT:    Clarification      Instruction:    Based on your assessment of the patient and the clinical information, please provide the requested documentation by clicking on the appropriate radio button and enter any additional information if prompted.    Question: Please further specify the type of pneumonia being treated    When answering this query, please exercise your independent professional judgment. The fact that a question is being asked, does not imply that any particular answer is desired or expected.    The patient's clinical indicators include:  Clinical Information: 79 year old man with SOB and weakness. Pt's diagnoses include ILD, acute on chronic hypoxic respiratory failure, metabolic encephalopathy, and pneumonia.    Clinical Indicators:   Progress Notes: Dr. Avina  \"...according to the staff he is aspirating, could be a combination of aspiration pneumonia also...\"     Pulmonary Progress Notes: Dr. Spangler  \"...found to have an abnormal chest x-ray suggestive of possible community-acquired pneumonia and/or an acute exacerbation of interstitial lung disease... Continue DuoNeb, Zosyn, Solu-Medrol and subcutaneous heparin.\"     Progress Notes: Dr. Kerns  \"...His chest x-ray is consistent with interstitial lung disease, although he is admitted with a diagnosis of pneumonia but chest x-ray revealed no evidence of consolidation...Patient was on IV ceftriaxone and azithromycin, changed to Zosyn on 2024.\"     SLP Progress Notes: SLP Aime  \"Medical team discussed with family [facility] with decision made to allow palliative feeding despite aspiration risk. Will discontinue SLP " "services...\"    Treatment:  -Ceftriaxone 2gm iv daily: 11/5-11/8  -Azithromycin 500mg iv: 11/5 and Azithromycin 500mg oral daily: 11/6-11/7  -Zosyn 2.25gm iv every 6 hours: 11/8-ongoing    Risk Factors: age, ILD, emphysema, aspiration risk  Options provided:  -- Aspiration pneumonia  -- Gram negative pneumonia  -- Pseudomonas pneumonia  -- Other - I will add my own diagnosis  -- Refer to Clinical Documentation Reviewer    Query created by: Lizeth Lopez on 11/11/2024 2:19 PM      Electronically signed by:  YUDELKA HANKINS MD 11/11/2024 4:02 PM          "

## 2024-11-11 NOTE — CARE PLAN
Problem: Skin  Goal: Decreased wound size/increased tissue granulation at next dressing change  11/11/2024 1301 by Inez Philippe RN  Outcome: Progressing  11/11/2024 1216 by Inez Philippe RN  Outcome: Progressing  11/11/2024 1208 by Inez Philippe RN  Outcome: Progressing  11/11/2024 1152 by Inez Philippe RN  Outcome: Progressing  Goal: Participates in plan/prevention/treatment measures  11/11/2024 1301 by Inez Philippe RN  Outcome: Progressing  11/11/2024 1208 by Inez Philippe RN  Outcome: Progressing  11/11/2024 1152 by Inez Philippe RN  Outcome: Progressing  Goal: Prevent/manage excess moisture  11/11/2024 1301 by Inez Philippe RN  Outcome: Progressing  11/11/2024 1208 by Inez Philippe RN  Outcome: Progressing  11/11/2024 1152 by Inez Philippe RN  Outcome: Progressing  Goal: Prevent/minimize sheer/friction injuries  11/11/2024 1301 by Inez Philippe RN  Outcome: Progressing  11/11/2024 1152 by Inez Philippe RN  Outcome: Progressing  Goal: Promote/optimize nutrition  11/11/2024 1301 by Inez Philippe RN  Outcome: Progressing  11/11/2024 1152 by Inez Philippe RN  Outcome: Progressing  Goal: Promote skin healing  11/11/2024 1301 by Inez Philippe RN  Outcome: Progressing  11/11/2024 1152 by Inez Philippe RN  Outcome: Progressing   The patient's goals for the shift include sleep    The clinical goals for the shift include safety to be maintained    Over the shift, the patient did not make progress toward the following goals. Barriers to progression include . Recommendations to address these barriers include .

## 2024-11-11 NOTE — CARE PLAN
Problem: Skin  Goal: Decreased wound size/increased tissue granulation at next dressing change  11/11/2024 1311 by Inez Philippe, RN  Outcome: Progressing  11/11/2024 1310 by Inez Philippe, RN  Outcome: Progressing  11/11/2024 1305 by Inez Philippe, RN  Outcome: Progressing  11/11/2024 1302 by Inez Philippe, RN  Outcome: Progressing  11/11/2024 1301 by Inez Philippe, RN  Outcome: Progressing  11/11/2024 1216 by Inez Philippe, RN  Outcome: Progressing  11/11/2024 1208 by Inez Philippe, RN  Outcome: Progressing  11/11/2024 1152 by Inez Philippe, RN  Outcome: Progressing  Goal: Participates in plan/prevention/treatment measures  11/11/2024 1311 by Inez Philippe, RN  Outcome: Progressing  11/11/2024 1310 by Inez Philippe, RN  Outcome: Progressing  11/11/2024 1305 by Inez Philippe, RN  Outcome: Progressing  11/11/2024 1302 by Inez Philippe, RN  Outcome: Progressing  11/11/2024 1301 by Inez Philippe, RN  Outcome: Progressing  11/11/2024 1208 by Inez Philippe, RN  Outcome: Progressing  11/11/2024 1152 by Inez Philippe, RN  Outcome: Progressing  Goal: Prevent/manage excess moisture  11/11/2024 1311 by Inez Philippe, RN  Outcome: Progressing  11/11/2024 1310 by Inez Philippe, RN  Outcome: Progressing  11/11/2024 1305 by Inez Philippe, RN  Outcome: Progressing  11/11/2024 1302 by Inez Philippe, RN  Outcome: Progressing  11/11/2024 1301 by Inez Philippe, RN  Outcome: Progressing  11/11/2024 1208 by Inez Philippe, RN  Outcome: Progressing  11/11/2024 1152 by Inez Philippe, RN  Outcome: Progressing  Goal: Prevent/minimize sheer/friction injuries  11/11/2024 1311 by Inez Philippe, RN  Outcome: Progressing  11/11/2024 1310 by Inez Philippe, RN  Outcome: Progressing  11/11/2024 1305 by Inez Philippe, RN  Outcome: Progressing  11/11/2024 1302 by Inez Philippe, RN  Outcome: Progressing  11/11/2024 1301 by Inez Philippe RN  Outcome: Progressing  11/11/2024 1152 by Inez Philippe RN  Outcome: Progressing  Goal: Promote/optimize nutrition  11/11/2024 1311 by Inez Philippe, RN  Outcome:  Progressing  11/11/2024 1310 by Inez Philippe RN  Outcome: Progressing  11/11/2024 1305 by Inez Philippe RN  Outcome: Progressing  11/11/2024 1302 by Inez Philippe RN  Outcome: Progressing  11/11/2024 1301 by Inez Philippe, RN  Outcome: Progressing  11/11/2024 1152 by Inez Philippe RN  Outcome: Progressing  Goal: Promote skin healing  11/11/2024 1311 by Inez Philippe RN  Outcome: Progressing  11/11/2024 1310 by Inez Philippe, RN  Outcome: Progressing  11/11/2024 1305 by Inez Philippe RN  Outcome: Progressing  11/11/2024 1302 by Inez Philippe, RN  Outcome: Progressing  11/11/2024 1301 by Inez Philippe RN  Outcome: Progressing  11/11/2024 1152 by Inez Philippe RN  Outcome: Progressing   The patient's goals for the shift include sleep    The clinical goals for the shift include safety to be maintained    Over the shift, the patient did not make progress toward the following goals. Barriers to progression include . Recommendations to address these barriers include .

## 2024-11-11 NOTE — PROGRESS NOTES
"Antonio Willis is a 79 y.o. male on day 6 of admission presenting with Pneumonia of right upper lobe due to infectious organism.    Subjective   Patient seen and examined, patient's daughter in room.  Patient is fairly comfortable, currently on 5 L nasal cannula oxygen.  His breathing is more or less unchanged, no chest pain, remains afebrile.  Awaiting to be assessed by PT OT.  Patient's family wants him to go to skilled nursing facility since wife will not be able to care for him in view of his significant decreased mobility.       Objective     Physical Exam  GENERAL:  Alert, mild distress, cooperative, afebrile  SKIN:  Skin color, texture, turgor normal. No rashes or lesions.  OROPHARYNX:  Lips, mucosa, and tongue are normal.Teeth and gums, normal. Oropharynx normal.  NECK:  No jugulovenous distention, No carotid bruits, Carotid pulse normal contour, Supple  LUNGS: Bilateral decreased breath sound posteriorly with fine crackles and occasional wheezes.  CARDIAC: Rate and rhythm is regular, normal S1 and S2; no rubs,  or gallops, 2/6 systolic ejection murmur left sternal border, no peripheral edema and no overt CHF.  PPM left upper chest.  ABDOMEN:  Abdomen soft, non-tender, BS normal, No masses or organomegaly  EXTREMETIES:  Extremities normal, no deformities, edema, clubbing or skin discoloration. Good capillary refill., No ulcers  NEURO:  Alert, oriented X 3, Gait not examined. Non-focal. Reflexes normal and symmetric. Sensation grossly intact., Cranial nerves II-XII intact  PULSES:  2+ radial, 2+ carotid    Last Recorded Vitals  Blood pressure 114/71, pulse 65, temperature 36.2 °C (97.2 °F), temperature source Temporal, resp. rate 19, height 1.651 m (5' 5\"), weight 76.7 kg (169 lb), SpO2 94%.  Intake/Output last 3 Shifts:  I/O last 3 completed shifts:  In: 1695 (22.1 mL/kg) [P.O.:300; I.V.:1095 (14.3 mL/kg); IV Piggyback:300]  Out: 1750 (22.8 mL/kg) [Urine:1750 (0.6 mL/kg/hr)]  Weight: 76.7 kg     Relevant " Results   Scheduled medications  cholecalciferol, 1,000 Units, oral, Daily  famotidine, 10 mg, oral, q48h  heparin (porcine), 5,000 Units, subcutaneous, q8h PAULETTE  ipratropium-albuteroL, 3 mL, nebulization, TID  melatonin, 4.5 mg, oral, q PM  methylPREDNISolone sodium succinate (PF), 10 mg, intravenous, Daily  OLANZapine, 2.5 mg, oral, Nightly  oxygen, , inhalation, Continuous - Inhalation  piperacillin-tazobactam, 2.25 g, intravenous, q6h  polyethylene glycol, 17 g, oral, Daily  rosuvastatin, 10 mg, oral, Nightly  sodium bicarbonate, 650 mg, oral, TID  tamsulosin, 0.4 mg, oral, Nightly      Continuous medications     PRN medications  PRN medications: acetaminophen, ipratropium-albuteroL, OLANZapine, ondansetron **OR** ondansetron     Results for orders placed or performed during the hospital encounter of 11/05/24 (from the past 96 hours)   CBC and Auto Differential   Result Value Ref Range    WBC 15.2 (H) 4.4 - 11.3 x10*3/uL    nRBC 0.0 0.0 - 0.0 /100 WBCs    RBC 3.18 (L) 4.50 - 5.90 x10*6/uL    Hemoglobin 10.4 (L) 13.5 - 17.5 g/dL    Hematocrit 33.3 (L) 41.0 - 52.0 %     (H) 80 - 100 fL    MCH 32.7 26.0 - 34.0 pg    MCHC 31.2 (L) 32.0 - 36.0 g/dL    RDW 19.4 (H) 11.5 - 14.5 %    Platelets 104 (L) 150 - 450 x10*3/uL    Neutrophils % 87.6 40.0 - 80.0 %    Immature Granulocytes %, Automated 1.6 (H) 0.0 - 0.9 %    Lymphocytes % 4.7 13.0 - 44.0 %    Monocytes % 5.9 2.0 - 10.0 %    Eosinophils % 0.1 0.0 - 6.0 %    Basophils % 0.1 0.0 - 2.0 %    Neutrophils Absolute 13.30 (H) 1.60 - 5.50 x10*3/uL    Immature Granulocytes Absolute, Automated 0.24 0.00 - 0.50 x10*3/uL    Lymphocytes Absolute 0.72 (L) 0.80 - 3.00 x10*3/uL    Monocytes Absolute 0.89 (H) 0.05 - 0.80 x10*3/uL    Eosinophils Absolute 0.01 0.00 - 0.40 x10*3/uL    Basophils Absolute 0.02 0.00 - 0.10 x10*3/uL   Comprehensive metabolic panel   Result Value Ref Range    Glucose 111 (H) 74 - 99 mg/dL    Sodium 151 (H) 136 - 145 mmol/L    Potassium 4.9 3.5 - 5.3  mmol/L    Chloride 123 (H) 98 - 107 mmol/L    Bicarbonate 18 (L) 21 - 32 mmol/L    Anion Gap 15 10 - 20 mmol/L    Urea Nitrogen 84 (H) 6 - 23 mg/dL    Creatinine 4.72 (H) 0.50 - 1.30 mg/dL    eGFR 12 (L) >60 mL/min/1.73m*2    Calcium 9.2 8.6 - 10.3 mg/dL    Albumin 3.5 3.4 - 5.0 g/dL    Alkaline Phosphatase 228 (H) 33 - 136 U/L    Total Protein 6.6 6.4 - 8.2 g/dL    AST 29 9 - 39 U/L    Bilirubin, Total 0.5 0.0 - 1.2 mg/dL    ALT 97 (H) 10 - 52 U/L   Renal function panel   Result Value Ref Range    Glucose 130 (H) 74 - 99 mg/dL    Sodium 148 (H) 136 - 145 mmol/L    Potassium 5.0 3.5 - 5.3 mmol/L    Chloride 120 (H) 98 - 107 mmol/L    Bicarbonate 19 (L) 21 - 32 mmol/L    Anion Gap 14 10 - 20 mmol/L    Urea Nitrogen 81 (H) 6 - 23 mg/dL    Creatinine 4.57 (H) 0.50 - 1.30 mg/dL    eGFR 12 (L) >60 mL/min/1.73m*2    Calcium 9.4 8.6 - 10.3 mg/dL    Phosphorus 4.7 2.5 - 4.9 mg/dL    Albumin 3.6 3.4 - 5.0 g/dL   POCT GLUCOSE   Result Value Ref Range    POCT Glucose 190 (H) 74 - 99 mg/dL   POCT GLUCOSE   Result Value Ref Range    POCT Glucose 275 (H) 74 - 99 mg/dL   Renal Function Panel   Result Value Ref Range    Glucose 192 (H) 74 - 99 mg/dL    Sodium 146 (H) 136 - 145 mmol/L    Potassium 5.0 3.5 - 5.3 mmol/L    Chloride 120 (H) 98 - 107 mmol/L    Bicarbonate 17 (L) 21 - 32 mmol/L    Anion Gap 14 10 - 20 mmol/L    Urea Nitrogen 76 (H) 6 - 23 mg/dL    Creatinine 4.13 (H) 0.50 - 1.30 mg/dL    eGFR 14 (L) >60 mL/min/1.73m*2    Calcium 8.8 8.6 - 10.3 mg/dL    Phosphorus 4.6 2.5 - 4.9 mg/dL    Albumin 3.3 (L) 3.4 - 5.0 g/dL   CBC   Result Value Ref Range    WBC 12.4 (H) 4.4 - 11.3 x10*3/uL    nRBC 0.0 0.0 - 0.0 /100 WBCs    RBC 3.34 (L) 4.50 - 5.90 x10*6/uL    Hemoglobin 11.0 (L) 13.5 - 17.5 g/dL    Hematocrit 35.7 (L) 41.0 - 52.0 %     (H) 80 - 100 fL    MCH 32.9 26.0 - 34.0 pg    MCHC 30.8 (L) 32.0 - 36.0 g/dL    RDW 19.0 (H) 11.5 - 14.5 %    Platelets 93 (L) 150 - 450 x10*3/uL   POCT GLUCOSE   Result Value Ref Range     POCT Glucose 157 (H) 74 - 99 mg/dL   POCT GLUCOSE   Result Value Ref Range    POCT Glucose 116 (H) 74 - 99 mg/dL   POCT GLUCOSE   Result Value Ref Range    POCT Glucose 75 74 - 99 mg/dL   CBC   Result Value Ref Range    WBC 9.0 4.4 - 11.3 x10*3/uL    nRBC 0.0 0.0 - 0.0 /100 WBCs    RBC 3.44 (L) 4.50 - 5.90 x10*6/uL    Hemoglobin 11.0 (L) 13.5 - 17.5 g/dL    Hematocrit 35.2 (L) 41.0 - 52.0 %     (H) 80 - 100 fL    MCH 32.0 26.0 - 34.0 pg    MCHC 31.3 (L) 32.0 - 36.0 g/dL    RDW 19.1 (H) 11.5 - 14.5 %    Platelets 103 (L) 150 - 450 x10*3/uL   Basic Metabolic Panel   Result Value Ref Range    Glucose 81 74 - 99 mg/dL    Sodium 148 (H) 136 - 145 mmol/L    Potassium 5.0 3.5 - 5.3 mmol/L    Chloride 120 (H) 98 - 107 mmol/L    Bicarbonate 20 (L) 21 - 32 mmol/L    Anion Gap 13 10 - 20 mmol/L    Urea Nitrogen 71 (H) 6 - 23 mg/dL    Creatinine 3.87 (H) 0.50 - 1.30 mg/dL    eGFR 15 (L) >60 mL/min/1.73m*2    Calcium 8.8 8.6 - 10.3 mg/dL      ECG 12 lead    Result Date: 11/9/2024  Atrial-paced rhythm with prolonged AV conduction Right bundle branch block Abnormal ECG When compared with ECG of 02-OCT-2024 10:58, (unconfirmed) No significant change was found See ED provider note for full interpretation and clinical correlation Confirmed by Rosa Rocha ([id number]) on 11/9/2024 11:48:27 AM    XR chest 1 view    Result Date: 11/7/2024  Interpreted By:  Marek Fenton, STUDY: XR CHEST 1 VIEW   INDICATION: Signs/Symptoms:worsening hypoxia.   COMPARISON: November 5, 2024   ACCESSION NUMBER(S): [id number]   ORDERING CLINICIAN: ALFREDA HORN   FINDINGS: Cardiomegaly with pacemaker.   Interstitial changes throughout both lungs right worse than left favoring chronic interstitial disease.   Overall appearance unchanged from prior.         Interstitial changes throughout both lungs right worse than left favoring chronic interstitial disease.   Overall appearance unchanged from prior.   Signed by: Marek Fenton 11/7/2024 11:36 AM  Dictation workstation:   [id number]    CT abdomen pelvis wo IV contrast    Result Date: 11/6/2024  Interpreted By:  Chase Rivera, STUDY: CT ABDOMEN PELVIS WO IV CONTRAST;  11/6/2024 2:59 pm   INDICATION: Signs/Symptoms:nephrolithiasis and history ogf Prostate carcinoma.     COMPARISON: 07/26/2023   ACCESSION NUMBER(S): [id number]   ORDERING CLINICIAN: YUDELKA HANKINS   TECHNIQUE: CT of the abdomen and pelvis was performed. Contiguous axial images were obtained at 3 mm slice thickness through the abdomen and pelvis. Coronal and sagittal reconstructions at 3 mm slice thickness were performed.  No intravenous contrast was administered.   FINDINGS: Please note that the evaluation of vessels, lymph nodes and organs is limited without intravenous contrast.   LOWER CHEST: The included lung bases demonstrate bibasilar bronchiectasis. Bibasilar infiltrates and interstitial thickening. Findings have progressed slightly since the prior exam. Cardiac pacer.   ABDOMEN:   LIVER: Grossly unremarkable. No definite focal liver lesions.   BILE DUCTS: No bile duct dilatation.   GALLBLADDER: Suboptimally distended and evaluated. No definite gallstones.   PANCREAS: Unremarkable pancreas.   SPLEEN: Unremarkable spleen.   ADRENAL GLANDS: Grossly unremarkable.   KIDNEYS AND URETERS: Cortical thinning of the left kidney with severe left-sided hydronephrosis and hydroureter. Nonobstructing left renal stones. There is a tiny stone within the dependent portion of the dilated distal left ureter measuring approximately 4.5 mm. Also another punctate 2 mm stone near the left UVJ. The larger previously noted distal left ureteral stone is no longer seen.   PELVIS:   BLADDER: Grossly unremarkable.   REPRODUCTIVE ORGANS: Calcifications within the prostate.       ABDOMINAL WALL: Fat containing left inguinal hernia.   BONES: Discogenic degenerative changes. Scoliosis.   No ascites.No retroperitoneal adenopathy.       1.  Persistent severe  left-sided hydronephrosis and hydroureter with cortical thinning of the left kidney. However previously noted larger distal left ureteral stone has resolved with residual much smaller 4 mm stone in the distal left ureter as described. 2. Worsening bibasilar bronchiectasis and interstitial thickening and ground-glass infiltrates.     MACRO: None   Signed by: Chase Rivera 11/6/2024 4:17 PM Dictation workstation:   [id number]    XR chest 2 views    Result Date: 11/5/2024  Interpreted By:  Pepe Scruggs, STUDY: XR CHEST 2 VIEWS;  11/5/2024 7:00 pm   INDICATION: Signs/Symptoms:cough.     COMPARISON: Radiographs of the chest dated 08/25/2024.   ACCESSION NUMBER(S): [id number]   ORDERING CLINICIAN: GEORGE LOPEZ   FINDINGS: PA and lateral radiographs of the chest were provided.   Dual lead left subclavian pacemaker is in place.   CARDIOMEDIASTINAL SILHOUETTE: Cardiomediastinal silhouette is mildly enlarged, similar in appearance to prior exam.   LUNGS: There is redemonstration of the interstitial prominence in the lungs bilaterally, likely representing component of chronic interstitial lung disease, with some increase in airspace opacities in the right upper lung compared to prior study on 08/24/2024. No evidence of pneumothorax or sizable pleural effusion.   ABDOMEN: No remarkable upper abdominal findings.   BONES: No acute osseous changes.       1.  Somewhat increased interstitial markings in the right upper lung compared to prior exam on 08/25/2024, possibly representing developing airspace infiltrate. 2. Prominence of the interstitial markings in the lungs bilaterally, likely representing component of chronic interstitial lung disease.   MACRO: None   Signed by: Pepe Scruggs 11/5/2024 7:12 PM Dictation workstation:   [id number]             Assessment/Plan   Assessment & Plan  Pneumonia of right upper lobe due to infectious organism  79-year-old gentleman with history of multiple sclerosis,  without significant neurological deficit, history of previous permanent pacemaker placement, hypertension, recent COVID infection about 2 months ago, interstitial lung disease, chronic smoking which he quit about a month ago, history of prostate carcinoma in the past s/p radiation treatment and history of BPH and nephrolithiasis, patient is on home oxygen for interstitial lung disease, presents with increasing shortness of breath and cough.  He normally uses 2 to 3 L of nasal cannula oxygen at home.  His chest x-ray is consistent with interstitial lung disease, although he is admitted with a diagnosis of pneumonia but chest x-ray revealed no evidence of consolidation.  He has had a high-resolution CT in September 2024 which is consistent with interstitial lung disease and significant emphysema.    1.  Acute exacerbation of chronic lung disease with wheezing and shortness of breath and questionable pneumonic infiltrate and history of interstitial lung disease.  White cell count is normal, chest x-ray revealed interstitial lung disease without definite consolidation.  Patient was on IV ceftriaxone and azithromycin, changed to Zosyn on 11/8/2024.  He does have a history of smoking and there is some element of COPD exacerbation therefore , patient  is on Solu-Medrol tapering doses which can be switched to oral prednisone soon.   Pulmonary on board.  To continue with oxygen to maintain his saturation above 92%.  Oxygen increased to 5 L nasal cannula.    2.  Low normal blood pressures,Will monitor blood pressure closely  Blood pressures have improved and normalized now.    3.  History of COVID infection few months ago currently has no symptoms related to COVID.    4.  Chronic kidney disease stage IV with a serum potassium of 5.6 and serum creatinine of 4.6 on admission.  Hyperkalemia has been treated and repeat potassium is 5.0 today.  Patient follows with Dr. Higgins as an outpatient.  No significant change in renal  function, remains elevated BUN and creatinine.  Nephrology on board, there is some improvement in creatinine to 3.87 today, still remains hypernatremic and hyperchloremic and patient was on D5 at 50 cc an hour over the weekend as per recommendation of nephrology.    5.  Previous history of prostate carcinoma s/p radiation and history of BPH and nephrolithiasis, patient had taken a second urological opinion at [facility] and  and has been following with Dr. Buckner,at  .   CT of abdomen &pelvis  on 11/7/24:  Persistent severe left-sided hydronephrosis and hydroureter with cortical thinning of the left kidney. However previously noted larger distal left ureteral stone has resolved with residual much smaller 4 mm stone in the distal left ureter as described. 2. Worsening bibasilar bronchiectasis and interstitial thickening and ground-glass infiltrates.     6.  Hyperlipidemia continue with current medications    Reviewed all labs and imaging studies and discussed the plan of treatment with patient in detail.  Discussed with patient's family, his daughter was in the room who stated that patient's wife would like him to go to a skilled nursing facility, patient to be evaluated by PT OT and and proceed with the evaluation for skilled nursing facility.  Acute pulmonary edema    Acute on chronic hypoxic respiratory failure (Multi)    Interstitial lung disease (Multi)    Acute metabolic encephalopathy         I spent 35 minutes in the professional and overall care of this patient.      Rome Kerns MD

## 2024-11-11 NOTE — PROGRESS NOTES
Antonio Willis is a 79 y.o. male on day 6 of admission presenting with Pneumonia of right upper lobe due to infectious organism.      Subjective   Patient seen and examined.   On 5 LPM NC. Moist cough w/ coarse breath sounds.  Chart/labs/meds/notes/imaging/VS reviewed.       Objective        Vitals 24HR  Heart Rate:  [61-65]   Temp:  [35 °C (95 °F)-36.3 °C (97.3 °F)]   Resp:  [15-19]   BP: (100-114)/(59-71)   SpO2:  [91 %-95 %]     Intake/Output last 3 Shifts:    Intake/Output Summary (Last 24 hours) at 11/11/2024 1629  Last data filed at 11/11/2024 0900  Gross per 24 hour   Intake 524.17 ml   Output 600 ml   Net -75.83 ml       Physical Exam  Constitutional: Arousable.    Eyes: PERRL, EOMI, clear sclera   ENMT: mucous membranes moist, no apparent injury, On 5-6 L NC   Head/Neck: Neck supple, no apparent injury, No JVD, trachea midline   Respiratory/Thorax: Mild bibasilar coarse breath sounds.   Cardiovascular: Regular, rate and rhythm,  2+ equal  pulses of the extremities, normal S 1and S 2   Gastrointestinal: Nondistended, soft, non-tender, no rebound tenderness or guarding, +BS   Musculoskeletal: Moves all 4 extremities. No joint swelling.   Extremities: No edema.   Neurological: Awake. No focal neurological deficits   Psychological: Appropriate mood and behavior   Skin: Warm and dry      Scheduled Medications  cholecalciferol, 1,000 Units, oral, Daily  famotidine, 10 mg, oral, q48h  heparin (porcine), 5,000 Units, subcutaneous, q8h PAULETTE  ipratropium-albuteroL, 3 mL, nebulization, TID  melatonin, 4.5 mg, oral, q PM  methylPREDNISolone sodium succinate (PF), 10 mg, intravenous, Daily  OLANZapine, 2.5 mg, oral, Nightly  oxygen, , inhalation, Continuous - Inhalation  piperacillin-tazobactam, 2.25 g, intravenous, q6h  polyethylene glycol, 17 g, oral, Daily  rosuvastatin, 10 mg, oral, Nightly  sodium bicarbonate, 650 mg, oral, TID  tamsulosin, 0.4 mg, oral, Nightly      Continuous medications       PRN medications:  acetaminophen, ipratropium-albuteroL, OLANZapine, ondansetron **OR** ondansetron     Relevant Results  Results from last 7 days   Lab Units 11/11/24  0512 11/10/24  0720 11/08/24  0810 11/07/24  0516 11/05/24  1828   WBC AUTO x10*3/uL 9.0 12.4* 15.2* 7.3 8.8   HEMOGLOBIN g/dL 11.0* 11.0* 10.4* 10.3* 11.8*   HEMATOCRIT % 35.2* 35.7* 33.3* 33.2* 37.8*   PLATELETS AUTO x10*3/uL 103* 93* 104* 94* 117*   NEUTROS PCT AUTO %  --   --  87.6 94.1 77.4   LYMPHS PCT AUTO %  --   --  4.7 4.7 11.7   MONOS PCT AUTO %  --   --  5.9 0.4 6.2   EOS PCT AUTO %  --   --  0.1 0.0 3.7     Results from last 7 days   Lab Units 11/11/24  0512 11/10/24  0719 11/09/24  0953   SODIUM mmol/L 148* 146* 148*   POTASSIUM mmol/L 5.0 5.0 5.0   CHLORIDE mmol/L 120* 120* 120*   CO2 mmol/L 20* 17* 19*   BUN mg/dL 71* 76* 81*   CREATININE mg/dL 3.87* 4.13* 4.57*   GLUCOSE mg/dL 81 192* 130*   CALCIUM mg/dL 8.8 8.8 9.4       XR chest 1 view   Final Result        Interstitial changes throughout both lungs right worse than left   favoring chronic interstitial disease.        Overall appearance unchanged from prior.        Signed by: Marek Fenton 11/7/2024 11:36 AM   Dictation workstation:   [id number]      CT abdomen pelvis wo IV contrast   Final Result   1.  Persistent severe left-sided hydronephrosis and hydroureter with   cortical thinning of the left kidney. However previously noted larger   distal left ureteral stone has resolved with residual much smaller 4   mm stone in the distal left ureter as described.   2. Worsening bibasilar bronchiectasis and interstitial thickening and   ground-glass infiltrates.             MACRO:   None        Signed by: Chase Rivera 11/6/2024 4:17 PM   Dictation workstation:   [id number]      XR chest 2 views   Final Result   1.  Somewhat increased interstitial markings in the right upper lung   compared to prior exam on 08/25/2024, possibly representing   developing airspace infiltrate.   2. Prominence of the  interstitial markings in the lungs bilaterally,   likely representing component of chronic interstitial lung disease.        MACRO:   None        Signed by: Pepe Scruggs 11/5/2024 7:12 PM   Dictation workstation:   [id number]               Assessment/Plan        Antonio Willis is a 79 y.o. male with a PMHx of recurrent nephrolithiasis, hyperlipidemia, prostate adenocarcinoma, multiple sclerosis, sinus node dysfunction status post dual chamber pacemaker placement, bifascicular block, history of GI bleed with gastric and duodenal ulcers, and stage G5 chronic kidney disease who is under the care of Dr. Higgins.     Mr. Willis has advanced stage G5 chronic kidney disease with presumably solitary renal function having the chronic left hydronephrosis.  He is noted to have a recent history of COVID in August.  He comes in now with shortness of breath, a productive cough with yellowish tinged mucus production. In the ED, reportedly his pulse ox was 70% on his usual 2 L/min nasal cannula.  This improved to 88% with a increase to 3 L.  Chest plain film showed increased interstitial right upper lung markings from 8/25/2024.  Thus he was admitted for pneumonia. There were bibasilar bronchiectasis and interstitial thickening and groundglass on CT imaging.  Left hydronephrosis is again noted. The previous large left distal ureteral stone has resolved.  Fortunately his renal function is stable. He was otherwise noted to have hypernatremia as he is on the modified diet with thickened liquids now given his aspiration risk.  His sodium previously corrected with D5 water albeit is now trending higher.  Palliative is following, his CODE STATUS was changed to DNR/DNI.  Family has discussed Dobbhoff versus PEG tube placement albeit they may not be headed in that direction.  Unfortunately, his hypernatremia will continue to be an issue in the current clinical setting.  He has a stable acidosis on sodium bicarbonate.  Trend his  RFP.  Will follow with his care.      Principal Problem:    Pneumonia of right upper lobe due to infectious organism  Active Problems:    Acute pulmonary edema    Acute on chronic hypoxic respiratory failure (Multi)    Interstitial lung disease (Multi)    Acute metabolic encephalopathy       I spent 40 minutes in the professional and overall care of this patient.      Hola Braun, DO

## 2024-11-11 NOTE — PROGRESS NOTES
Speech-Language Pathology                 Therapy Communication Note    Patient Name: Antonio Willis  MRN: [id number]  Department: [place] [address]  Room: [address] [place] [address]  Today's Date: 11/11/2024     Discipline: Speech Language Pathology    Missed Time: Cancel    Comment: Medical team discussed with family GOC with decision made to allow palliative feeding despite aspiration risk. Will discontinue SLP services at this time with no further therapy indicated.

## 2024-11-12 ENCOUNTER — APPOINTMENT (OUTPATIENT)
Dept: NEUROLOGY | Facility: CLINIC | Age: 79
End: 2024-11-12
Payer: MEDICARE

## 2024-11-12 ENCOUNTER — APPOINTMENT (OUTPATIENT)
Dept: PHYSICAL THERAPY | Facility: CLINIC | Age: 79
End: 2024-11-12
Payer: MEDICARE

## 2024-11-12 LAB
ANION GAP SERPL CALC-SCNC: 13 MMOL/L (ref 10–20)
APPEARANCE UR: ABNORMAL
BACTERIA #/AREA URNS AUTO: ABNORMAL /HPF
BILIRUB UR STRIP.AUTO-MCNC: NEGATIVE MG/DL
BUN SERPL-MCNC: 80 MG/DL (ref 6–23)
CALCIUM SERPL-MCNC: 8.8 MG/DL (ref 8.6–10.3)
CHLORIDE SERPL-SCNC: 123 MMOL/L (ref 98–107)
CO2 SERPL-SCNC: 20 MMOL/L (ref 21–32)
COLOR UR: ABNORMAL
CREAT SERPL-MCNC: 4.14 MG/DL (ref 0.5–1.3)
EGFRCR SERPLBLD CKD-EPI 2021: 14 ML/MIN/1.73M*2
ERYTHROCYTE [DISTWIDTH] IN BLOOD BY AUTOMATED COUNT: 19.4 % (ref 11.5–14.5)
GLUCOSE SERPL-MCNC: 81 MG/DL (ref 74–99)
GLUCOSE UR STRIP.AUTO-MCNC: ABNORMAL MG/DL
HCT VFR BLD AUTO: 35.2 % (ref 41–52)
HGB BLD-MCNC: 11.2 G/DL (ref 13.5–17.5)
HOLD SPECIMEN: NORMAL
KETONES UR STRIP.AUTO-MCNC: ABNORMAL MG/DL
LEUKOCYTE ESTERASE UR QL STRIP.AUTO: ABNORMAL
MCH RBC QN AUTO: 32.7 PG (ref 26–34)
MCHC RBC AUTO-ENTMCNC: 31.8 G/DL (ref 32–36)
MCV RBC AUTO: 103 FL (ref 80–100)
NITRITE UR QL STRIP.AUTO: NEGATIVE
NRBC BLD-RTO: 0.4 /100 WBCS (ref 0–0)
PH UR STRIP.AUTO: 6 [PH]
PLATELET # BLD AUTO: 100 X10*3/UL (ref 150–450)
POTASSIUM SERPL-SCNC: 5.3 MMOL/L (ref 3.5–5.3)
PROT UR STRIP.AUTO-MCNC: ABNORMAL MG/DL
RBC # BLD AUTO: 3.43 X10*6/UL (ref 4.5–5.9)
RBC # UR STRIP.AUTO: ABNORMAL /UL
RBC #/AREA URNS AUTO: >20 /HPF
SODIUM SERPL-SCNC: 151 MMOL/L (ref 136–145)
SP GR UR STRIP.AUTO: 1.03
UROBILINOGEN UR STRIP.AUTO-MCNC: NORMAL MG/DL
WBC # BLD AUTO: 10 X10*3/UL (ref 4.4–11.3)
WBC #/AREA URNS AUTO: >50 /HPF

## 2024-11-12 PROCEDURE — 87086 URINE CULTURE/COLONY COUNT: CPT | Mod: AHULAB | Performed by: PHYSICIAN ASSISTANT

## 2024-11-12 PROCEDURE — 99232 SBSQ HOSP IP/OBS MODERATE 35: CPT | Performed by: INTERNAL MEDICINE

## 2024-11-12 PROCEDURE — 99232 SBSQ HOSP IP/OBS MODERATE 35: CPT | Performed by: NURSE PRACTITIONER

## 2024-11-12 PROCEDURE — 94668 MNPJ CHEST WALL SBSQ: CPT

## 2024-11-12 PROCEDURE — 9420000001 HC RT PATIENT EDUCATION 5 MIN

## 2024-11-12 PROCEDURE — 87449 NOS EACH ORGANISM AG IA: CPT | Mod: AHULAB | Performed by: INTERNAL MEDICINE

## 2024-11-12 PROCEDURE — 36415 COLL VENOUS BLD VENIPUNCTURE: CPT | Performed by: INTERNAL MEDICINE

## 2024-11-12 PROCEDURE — 85027 COMPLETE CBC AUTOMATED: CPT | Performed by: INTERNAL MEDICINE

## 2024-11-12 PROCEDURE — 87899 AGENT NOS ASSAY W/OPTIC: CPT | Mod: AHULAB | Performed by: INTERNAL MEDICINE

## 2024-11-12 PROCEDURE — 94640 AIRWAY INHALATION TREATMENT: CPT

## 2024-11-12 PROCEDURE — 80048 BASIC METABOLIC PNL TOTAL CA: CPT | Performed by: INTERNAL MEDICINE

## 2024-11-12 PROCEDURE — 1100000001 HC PRIVATE ROOM DAILY

## 2024-11-12 PROCEDURE — 2500000002 HC RX 250 W HCPCS SELF ADMINISTERED DRUGS (ALT 637 FOR MEDICARE OP, ALT 636 FOR OP/ED): Performed by: INTERNAL MEDICINE

## 2024-11-12 PROCEDURE — 2500000005 HC RX 250 GENERAL PHARMACY W/O HCPCS: Performed by: INTERNAL MEDICINE

## 2024-11-12 PROCEDURE — 99497 ADVNCD CARE PLAN 30 MIN: CPT | Performed by: INTERNAL MEDICINE

## 2024-11-12 PROCEDURE — 2500000004 HC RX 250 GENERAL PHARMACY W/ HCPCS (ALT 636 FOR OP/ED): Performed by: INTERNAL MEDICINE

## 2024-11-12 PROCEDURE — 81001 URINALYSIS AUTO W/SCOPE: CPT | Performed by: PHYSICIAN ASSISTANT

## 2024-11-12 PROCEDURE — 2500000004 HC RX 250 GENERAL PHARMACY W/ HCPCS (ALT 636 FOR OP/ED): Performed by: NURSE PRACTITIONER

## 2024-11-12 PROCEDURE — 99233 SBSQ HOSP IP/OBS HIGH 50: CPT | Performed by: INTERNAL MEDICINE

## 2024-11-12 PROCEDURE — 2500000004 HC RX 250 GENERAL PHARMACY W/ HCPCS (ALT 636 FOR OP/ED): Performed by: HOSPITALIST

## 2024-11-12 RX ORDER — DIAZEPAM 5 MG/1
5 TABLET ORAL EVERY 8 HOURS PRN
Status: DISCONTINUED | OUTPATIENT
Start: 2024-11-12 | End: 2024-11-13 | Stop reason: HOSPADM

## 2024-11-12 RX ORDER — AMOXICILLIN AND CLAVULANATE POTASSIUM 875; 125 MG/1; MG/1
1 TABLET, FILM COATED ORAL 2 TIMES DAILY
Status: DISCONTINUED | OUTPATIENT
Start: 2024-11-12 | End: 2024-11-12

## 2024-11-12 RX ORDER — FUROSEMIDE 10 MG/ML
80 INJECTION INTRAMUSCULAR; INTRAVENOUS ONCE
Status: COMPLETED | OUTPATIENT
Start: 2024-11-12 | End: 2024-11-12

## 2024-11-12 RX ORDER — CEFTRIAXONE 1 G/50ML
1 INJECTION, SOLUTION INTRAVENOUS EVERY 24 HOURS
Status: DISCONTINUED | OUTPATIENT
Start: 2024-11-12 | End: 2024-11-12

## 2024-11-12 RX ORDER — HALOPERIDOL 5 MG/ML
1 INJECTION INTRAMUSCULAR EVERY 6 HOURS PRN
Status: DISCONTINUED | OUTPATIENT
Start: 2024-11-12 | End: 2024-11-13

## 2024-11-12 RX ADMIN — Medication 8 L/MIN: at 23:57

## 2024-11-12 RX ADMIN — PIPERACILLIN SODIUM AND TAZOBACTAM SODIUM 2.25 G: 2; .25 INJECTION, SOLUTION INTRAVENOUS at 09:03

## 2024-11-12 RX ADMIN — OLANZAPINE 5 MG: 10 INJECTION, POWDER, FOR SOLUTION INTRAMUSCULAR at 12:55

## 2024-11-12 RX ADMIN — PIPERACILLIN SODIUM AND TAZOBACTAM SODIUM 2.25 G: 2; .25 INJECTION, SOLUTION INTRAVENOUS at 03:07

## 2024-11-12 RX ADMIN — Medication 8 L/MIN: at 08:00

## 2024-11-12 RX ADMIN — PIPERACILLIN SODIUM AND TAZOBACTAM SODIUM 2.25 G: 2; .25 INJECTION, SOLUTION INTRAVENOUS at 14:56

## 2024-11-12 RX ADMIN — IPRATROPIUM BROMIDE AND ALBUTEROL SULFATE 3 ML: 2.5; .5 SOLUTION RESPIRATORY (INHALATION) at 08:04

## 2024-11-12 RX ADMIN — FUROSEMIDE 80 MG: 10 INJECTION, SOLUTION INTRAMUSCULAR; INTRAVENOUS at 13:53

## 2024-11-12 RX ADMIN — METHYLPREDNISOLONE SODIUM SUCCINATE 10 MG: 40 INJECTION, POWDER, FOR SOLUTION INTRAMUSCULAR; INTRAVENOUS at 09:03

## 2024-11-12 RX ADMIN — PIPERACILLIN SODIUM AND TAZOBACTAM SODIUM 2.25 G: 2; .25 INJECTION, SOLUTION INTRAVENOUS at 23:51

## 2024-11-12 RX ADMIN — IPRATROPIUM BROMIDE AND ALBUTEROL SULFATE 3 ML: 2.5; .5 SOLUTION RESPIRATORY (INHALATION) at 13:57

## 2024-11-12 RX ADMIN — HALOPERIDOL LACTATE 1 MG: 5 INJECTION, SOLUTION INTRAMUSCULAR at 17:21

## 2024-11-12 RX ADMIN — Medication 8 L/MIN: at 04:23

## 2024-11-12 RX ADMIN — IPRATROPIUM BROMIDE AND ALBUTEROL SULFATE 3 ML: 2.5; .5 SOLUTION RESPIRATORY (INHALATION) at 21:15

## 2024-11-12 ASSESSMENT — COGNITIVE AND FUNCTIONAL STATUS - GENERAL
WALKING IN HOSPITAL ROOM: TOTAL
PERSONAL GROOMING: TOTAL
HELP NEEDED FOR BATHING: TOTAL
DRESSING REGULAR UPPER BODY CLOTHING: TOTAL
MOVING FROM LYING ON BACK TO SITTING ON SIDE OF FLAT BED WITH BEDRAILS: TOTAL
CLIMB 3 TO 5 STEPS WITH RAILING: TOTAL
PERSONAL GROOMING: TOTAL
DAILY ACTIVITIY SCORE: 6
EATING MEALS: TOTAL
STANDING UP FROM CHAIR USING ARMS: TOTAL
TOILETING: TOTAL
MOVING TO AND FROM BED TO CHAIR: TOTAL
TURNING FROM BACK TO SIDE WHILE IN FLAT BAD: TOTAL
TOILETING: TOTAL
MOVING TO AND FROM BED TO CHAIR: TOTAL
DRESSING REGULAR LOWER BODY CLOTHING: TOTAL
WALKING IN HOSPITAL ROOM: TOTAL
EATING MEALS: TOTAL
TURNING FROM BACK TO SIDE WHILE IN FLAT BAD: TOTAL
MOBILITY SCORE: 6
DRESSING REGULAR UPPER BODY CLOTHING: TOTAL
DAILY ACTIVITIY SCORE: 6
STANDING UP FROM CHAIR USING ARMS: TOTAL
DRESSING REGULAR LOWER BODY CLOTHING: TOTAL
HELP NEEDED FOR BATHING: TOTAL
CLIMB 3 TO 5 STEPS WITH RAILING: TOTAL

## 2024-11-12 ASSESSMENT — PAIN SCALES - GENERAL: PAINLEVEL_OUTOF10: 0 - NO PAIN

## 2024-11-12 ASSESSMENT — PAIN - FUNCTIONAL ASSESSMENT: PAIN_FUNCTIONAL_ASSESSMENT: 0-10

## 2024-11-12 NOTE — SIGNIFICANT EVENT
Proceeded to give pt a scheduled breathing tx. Pt became aggressive and ripped mask and stated that he did not want. Upon arrival pts SpO2 was 86% on 6L NC. Placed pt on 10L HFNC and placed a continuous pulse ox on pts toe due to pt not wanting to have anything on his hands. SpO2 increased to 92%. Pts BS Course/Rhonchi pt refused oral sxn and also breathing exercises. RN aware.

## 2024-11-12 NOTE — PROGRESS NOTES
PALLIATIVE CARE SOCIAL WORK NOTE     Hospice order received. I met with pt's daughter Daiana and wife Emma at bedside this morning. The initial plan was for him to go to SNF and then transition to long term care, with hospice, following SNF, however, they are now wanting hospice care to start immediately at time of discharge from the hospital. They indicated that they had spoken with Dr Higgins this morning and that he had suggested that [facility] be [facility]. [facility]erral to be [facility] to [facility] via [facility]. [facility]'s on-site OLY Toscano to meet with them this afternoon to see if transfer to [facility]  is a possibility. Will continue to follow as appropriate. Thank you.     BRODIE Amaro     Addendum 4:07 pm  Pt/family met with OLY Toscano from [facility]. The plan will be for transfer to [facility] tomorrow morning. Arrangements per R. Thank you.

## 2024-11-12 NOTE — PROGRESS NOTES
"Subjective Data:  BP improved   Palliative care consulted-> plans to discharge to hospice house  He is DNR/DNI    Tele showing paced rhythm    Overnight Events:    N/A     Objective Data:  Last Recorded Vitals:  Vitals:    11/12/24 0800 11/12/24 0804 11/12/24 1105 11/12/24 1357   BP:   94/65    BP Location:   Right arm    Patient Position:   Lying    Pulse:  70     Resp:  14     Temp:   35.7 °C (96.3 °F)    TempSrc:   Temporal    SpO2: (!) 89%  91% 93%   Weight:       Height:           Last Labs:  LABS:  CMP:  Results from last 7 days   Lab Units 11/12/24  0515 11/11/24  0512 11/10/24  0719 11/09/24  0953 11/08/24  0810 11/07/24  0516 11/06/24  1819 11/06/24  1124 11/05/24  1828   SODIUM mmol/L 151* 148* 146* 148* 151* 149* 149* 150* 148*   POTASSIUM mmol/L 5.3 5.0 5.0 5.0 4.9 5.3 4.6 4.3 5.6*   CHLORIDE mmol/L 123* 120* 120* 120* 123* 126* 124* 126* 122*   CO2 mmol/L 20* 20* 17* 19* 18* 15* 16* 17* 19*   ANION GAP mmol/L 13 13 14 14 15 13 14 11 13   BUN mg/dL 80* 71* 76* 81* 84* 78* 81* 90* 87*   CREATININE mg/dL 4.14* 3.87* 4.13* 4.57* 4.72* 4.59* 4.70* 4.72* 4.98*   EGFR mL/min/1.73m*2 14* 15* 14* 12* 12* 12* 12* 12* 11*   ALBUMIN g/dL  --   --  3.3* 3.6 3.5  --   --   --  3.7   ALT U/L  --   --   --   --  97*  --   --   --  188*   AST U/L  --   --   --   --  29  --   --   --  130*   BILIRUBIN TOTAL mg/dL  --   --   --   --  0.5  --   --   --  0.6     CBC:  Results from last 7 days   Lab Units 11/12/24  0515 11/11/24  0512 11/10/24  0720 11/08/24  0810 11/07/24  0516 11/05/24  1828   WBC AUTO x10*3/uL 10.0 9.0 12.4* 15.2* 7.3 8.8   HEMOGLOBIN g/dL 11.2* 11.0* 11.0* 10.4* 10.3* 11.8*   HEMATOCRIT % 35.2* 35.2* 35.7* 33.3* 33.2* 37.8*   PLATELETS AUTO x10*3/uL 100* 103* 93* 104* 94* 117*   MCV fL 103* 102* 107* 105* 105* 104*     COAG:     ABO: No results found for: \"ABO\"  HEME/ENDO:     CARDIAC:   Results from last 7 days   Lab Units 11/05/24  1828   TROPHS ng/L 17   BNP pg/mL 127*     Recent Labs     " 10/31/24  1353 08/29/23  0825 07/30/22  1002 07/26/21  0902   CHOL 105 114 112 118   LDLF  --  55 54 52   LDLCALC 40  --   --   --    HDL 46.8 38.4* 44.8 46.5   TRIG 89 104 67 100      [facility] Results  XR chest 1 view   Final Result        Interstitial changes throughout both lungs right worse than left   favoring chronic interstitial disease.        Overall appearance unchanged from prior.        Signed by: Marek Fenton 11/7/2024 11:36 AM   Dictation workstation:   [id number]      CT abdomen pelvis wo IV contrast   Final Result   1.  Persistent severe left-sided hydronephrosis and hydroureter with   cortical thinning of the left kidney. However previously noted larger   distal left ureteral stone has resolved with residual much smaller 4   mm stone in the distal left ureter as described.   2. Worsening bibasilar bronchiectasis and interstitial thickening and   ground-glass infiltrates.             MACRO:   None        Signed by: Chase Rivera 11/6/2024 4:17 PM   Dictation workstation:   [id number]      XR chest 2 views   Final Result   1.  Somewhat increased interstitial markings in the right upper lung   compared to prior exam on 08/25/2024, possibly representing   developing airspace infiltrate.   2. Prominence of the interstitial markings in the lungs bilaterally,   likely representing component of chronic interstitial lung disease.        MACRO:   None        Signed by: Pepe Scruggs 11/5/2024 7:12 PM   Dictation workstation:   [id number]           Last I/O:  I/O last 3 completed shifts:  In: 330 (4.3 mL/kg) [P.O.:230; IV Piggyback:100]  Out: 600 (7.8 mL/kg) [Urine:600 (0.2 mL/kg/hr)]  Weight: 76.7 kg     Past Cardiology Tests (Last 3 Years):  EKG:  ECG 12 lead 11/05/2024        Echo:  Transthoracic echo (TTE) complete 10/10/2024   1. Left ventricular ejection fraction is normal, by visual estimate at 60-65%.   2. Spectral Doppler shows an impaired relaxation pattern of left ventricular diastolic  filling.   3. Left ventricular cavity size is decreased.   4. There is normal right ventricular global systolic function.   5. Mild to moderate tricuspid regurgitation visualized.   6. Moderately elevated right ventricular systolic pressure.    Ejection Fractions:  EF   Date/Time Value Ref Range Status   10/10/2024 10:46 AM 63 %      Cath:  No results found for this or any previous visit from the past 1095 days.    Stress Test:  No results found for this or any previous visit from the past 1095 days.    Cardiac Imaging:  No results found for this or any previous visit from the past 1095 days.      Inpatient Medications:  Scheduled medications   Medication Dose Route Frequency    cholecalciferol  1,000 Units oral Daily    famotidine  10 mg oral q48h    heparin (porcine)  5,000 Units subcutaneous q8h PAULETTE    ipratropium-albuteroL  3 mL nebulization TID    melatonin  4.5 mg oral q PM    OLANZapine  2.5 mg oral Nightly    oxygen   inhalation Continuous - Inhalation    piperacillin-tazobactam  2.25 g intravenous q6h    polyethylene glycol  17 g oral Daily    rosuvastatin  10 mg oral Nightly    sodium bicarbonate  650 mg oral TID    tamsulosin  0.4 mg oral Nightly     PRN medications   Medication    acetaminophen    ipratropium-albuteroL    ondansetron    Or    ondansetron     Continuous Medications   Medication Dose Last Rate       Physical Exam:  GENERAL: Confused   SKIN: warm, dry  NECK: no JVD  CARDIAC: Regular rate and rhythm no murmurs  PULMONARY: SOB, rhonchorous breath sounds  On supplemental oxygen via 6 L NC  ABDOMEN: soft, nondistended  EXTREMITIES: no lower extremity edema  NEURO: Confused, moving all extremities      Assessment/Plan   Antonio Willis is a 79 y.o. male with past medical history significant for hyperlipidemia with statin intolerance, sinus node dysfunction status post dual-chamber pacemaker placement 10/17/2011, bifascicular block, upper GI bleed in the setting of gastric and duodenal ulcers,  interstitial lung disease, COPD, chronic hypoxic respiratory failure, chronic tobacco use,  chronic kidney disease, multiple sclerosis. Presented with shortness of breath. Cardiology is consulted for Patient with low BP, blood pressure ranging 86-104mmHG, not an antihypertensive medication and troponin negative and EKG unremarkable.     Patient presents with 2 to 3-day history of increasing shortness of breath, generalized malaise and fatigue, and low oxygen saturation at home. No chest discomfort.     Acute on chronic hypoxic respiratory failure due to interstitial lung disease, COPD exacerbation, possible pneumonia- Now with worsening respiratory status  Asymptomatic hypotension due to volume depletion - resolved  CKD 4  Hyperlipidemia  History of sinus node dysfunction status post pacemaker placement  Bifascicular block  CHRISTIAN on CKD- Nephrology now following.  Hypernatremia      Recommendation:  Hospice meeting today, plans to discharge to hospice house    Given furosemide today per renal   No further cardiac recommendations at this time         Code Status:  DNR and No Intubation      Leti Garcia, APRN-CNP

## 2024-11-12 NOTE — PROGRESS NOTES
Antonio Willis is a 79 y.o. male on day 7 of admission presenting with Pneumonia of right upper lobe due to infectious organism.    Subjective   Patient seen and examined.  This is a 79-year-old gentleman with history of interstitial lung disease, hypertension, previous history of smoking until a month ago, COPD and emphysema was admitted with acute respiratory distress and hypoxia and pneumonia and has been treated with IV antibiotics without much improvement.  Patient remains hypoxic and on 5 L nasal cannula oxygen.  His general condition has worsened and he is showing not any improvement with aggressive medical therapy.  Patient has been seen by cardiology, pulmonary and nephrology during his stay in the hospital.  Patient also has chronic kidney disease stage IV.  Palliative care consult was obtained and family has requested hospice care and hospice consult was completed this afternoon and after detailed discussion family has decided for patient to be discharged to hospice care on November 13, 2024.       Objective     Physical Exam  GENERAL:   lethargic, moderate distress, cooperative, afebrile  SKIN:  Skin color, texture, turgor normal. No rashes or lesions.  OROPHARYNX:  Lips, mucosa, and tongue are normal.Teeth and gums, normal. Oropharynx normal.  NECK:  No jugulovenous distention, No carotid bruits, Carotid pulse normal contour, Supple  LUNGS: Generalized diminished air entry with bilateral crackles and wheezes posteriorly, no pleural rub.  CARDIAC: Rate and rhythm regular, normal S1 and S2; no rubs,  or gallops, 2/6 systolic ejection murmur left sternal border, permanent pacemaker left upper chest.  ABDOMEN:  Abdomen soft, non-tender, BS normal, No masses or organomegaly  EXTREMETIES:  Extremities normal, no deformities, edema, clubbing or skin discoloration. Good capillary refill., No ulcers  NEURO:  Alert, oriented X 3, Gait not examined.. Non-focal. Reflexes normal and symmetric. Sensation grossly  "intact., Cranial nerves II-XII intact  PULSES:  2+ radial, 2+ carotid    Last Recorded Vitals  Blood pressure 94/65, pulse 70, temperature 35.7 °C (96.3 °F), temperature source Temporal, resp. rate 14, height 1.651 m (5' 5\"), weight 76.7 kg (169 lb), SpO2 93%.  Intake/Output last 3 Shifts:  I/O last 3 completed shifts:  In: 330 (4.3 mL/kg) [P.O.:230; IV Piggyback:100]  Out: 600 (7.8 mL/kg) [Urine:600 (0.2 mL/kg/hr)]  Weight: 76.7 kg     Relevant Results  Scheduled medications  cholecalciferol, 1,000 Units, oral, Daily  famotidine, 10 mg, oral, q48h  heparin (porcine), 5,000 Units, subcutaneous, q8h PAULETTE  ipratropium-albuteroL, 3 mL, nebulization, TID  melatonin, 4.5 mg, oral, q PM  OLANZapine, 2.5 mg, oral, Nightly  oxygen, , inhalation, Continuous - Inhalation  piperacillin-tazobactam, 2.25 g, intravenous, q6h  polyethylene glycol, 17 g, oral, Daily  rosuvastatin, 10 mg, oral, Nightly  sodium bicarbonate, 650 mg, oral, TID  tamsulosin, 0.4 mg, oral, Nightly      Continuous medications     PRN medications  PRN medications: acetaminophen, ipratropium-albuteroL, ondansetron **OR** ondansetron       Results for orders placed or performed during the hospital encounter of 11/05/24 (from the past 96 hours)   Renal function panel   Result Value Ref Range    Glucose 130 (H) 74 - 99 mg/dL    Sodium 148 (H) 136 - 145 mmol/L    Potassium 5.0 3.5 - 5.3 mmol/L    Chloride 120 (H) 98 - 107 mmol/L    Bicarbonate 19 (L) 21 - 32 mmol/L    Anion Gap 14 10 - 20 mmol/L    Urea Nitrogen 81 (H) 6 - 23 mg/dL    Creatinine 4.57 (H) 0.50 - 1.30 mg/dL    eGFR 12 (L) >60 mL/min/1.73m*2    Calcium 9.4 8.6 - 10.3 mg/dL    Phosphorus 4.7 2.5 - 4.9 mg/dL    Albumin 3.6 3.4 - 5.0 g/dL   POCT GLUCOSE   Result Value Ref Range    POCT Glucose 190 (H) 74 - 99 mg/dL   POCT GLUCOSE   Result Value Ref Range    POCT Glucose 275 (H) 74 - 99 mg/dL   Renal Function Panel   Result Value Ref Range    Glucose 192 (H) 74 - 99 mg/dL    Sodium 146 (H) 136 - 145 " mmol/L    Potassium 5.0 3.5 - 5.3 mmol/L    Chloride 120 (H) 98 - 107 mmol/L    Bicarbonate 17 (L) 21 - 32 mmol/L    Anion Gap 14 10 - 20 mmol/L    Urea Nitrogen 76 (H) 6 - 23 mg/dL    Creatinine 4.13 (H) 0.50 - 1.30 mg/dL    eGFR 14 (L) >60 mL/min/1.73m*2    Calcium 8.8 8.6 - 10.3 mg/dL    Phosphorus 4.6 2.5 - 4.9 mg/dL    Albumin 3.3 (L) 3.4 - 5.0 g/dL   CBC   Result Value Ref Range    WBC 12.4 (H) 4.4 - 11.3 x10*3/uL    nRBC 0.0 0.0 - 0.0 /100 WBCs    RBC 3.34 (L) 4.50 - 5.90 x10*6/uL    Hemoglobin 11.0 (L) 13.5 - 17.5 g/dL    Hematocrit 35.7 (L) 41.0 - 52.0 %     (H) 80 - 100 fL    MCH 32.9 26.0 - 34.0 pg    MCHC 30.8 (L) 32.0 - 36.0 g/dL    RDW 19.0 (H) 11.5 - 14.5 %    Platelets 93 (L) 150 - 450 x10*3/uL   POCT GLUCOSE   Result Value Ref Range    POCT Glucose 157 (H) 74 - 99 mg/dL   POCT GLUCOSE   Result Value Ref Range    POCT Glucose 116 (H) 74 - 99 mg/dL   POCT GLUCOSE   Result Value Ref Range    POCT Glucose 75 74 - 99 mg/dL   CBC   Result Value Ref Range    WBC 9.0 4.4 - 11.3 x10*3/uL    nRBC 0.0 0.0 - 0.0 /100 WBCs    RBC 3.44 (L) 4.50 - 5.90 x10*6/uL    Hemoglobin 11.0 (L) 13.5 - 17.5 g/dL    Hematocrit 35.2 (L) 41.0 - 52.0 %     (H) 80 - 100 fL    MCH 32.0 26.0 - 34.0 pg    MCHC 31.3 (L) 32.0 - 36.0 g/dL    RDW 19.1 (H) 11.5 - 14.5 %    Platelets 103 (L) 150 - 450 x10*3/uL   Basic Metabolic Panel   Result Value Ref Range    Glucose 81 74 - 99 mg/dL    Sodium 148 (H) 136 - 145 mmol/L    Potassium 5.0 3.5 - 5.3 mmol/L    Chloride 120 (H) 98 - 107 mmol/L    Bicarbonate 20 (L) 21 - 32 mmol/L    Anion Gap 13 10 - 20 mmol/L    Urea Nitrogen 71 (H) 6 - 23 mg/dL    Creatinine 3.87 (H) 0.50 - 1.30 mg/dL    eGFR 15 (L) >60 mL/min/1.73m*2    Calcium 8.8 8.6 - 10.3 mg/dL   CBC   Result Value Ref Range    WBC 10.0 4.4 - 11.3 x10*3/uL    nRBC 0.4 (H) 0.0 - 0.0 /100 WBCs    RBC 3.43 (L) 4.50 - 5.90 x10*6/uL    Hemoglobin 11.2 (L) 13.5 - 17.5 g/dL    Hematocrit 35.2 (L) 41.0 - 52.0 %     (H) 80 -  100 fL    MCH 32.7 26.0 - 34.0 pg    MCHC 31.8 (L) 32.0 - 36.0 g/dL    RDW 19.4 (H) 11.5 - 14.5 %    Platelets 100 (L) 150 - 450 x10*3/uL   Basic Metabolic Panel   Result Value Ref Range    Glucose 81 74 - 99 mg/dL    Sodium 151 (H) 136 - 145 mmol/L    Potassium 5.3 3.5 - 5.3 mmol/L    Chloride 123 (H) 98 - 107 mmol/L    Bicarbonate 20 (L) 21 - 32 mmol/L    Anion Gap 13 10 - 20 mmol/L    Urea Nitrogen 80 (H) 6 - 23 mg/dL    Creatinine 4.14 (H) 0.50 - 1.30 mg/dL    eGFR 14 (L) >60 mL/min/1.73m*2    Calcium 8.8 8.6 - 10.3 mg/dL   Urinalysis with Reflex Culture and Microscopic   Result Value Ref Range    Color, Urine Light-Brown (N) Light-Yellow, Yellow, Dark-Yellow    Appearance, Urine Turbid (N) Clear    Specific Gravity, Urine 1.026 1.005 - 1.035    pH, Urine 6.0 5.0, 5.5, 6.0, 6.5, 7.0, 7.5, 8.0    Protein, Urine 70 (1+) (A) NEGATIVE, 10 (TRACE), 20 (TRACE) mg/dL    Glucose, Urine 30 (TRACE) (A) Normal mg/dL    Blood, Urine OVER (3+) (A) NEGATIVE    Ketones, Urine TRACE (A) NEGATIVE mg/dL    Bilirubin, Urine NEGATIVE NEGATIVE    Urobilinogen, Urine Normal Normal mg/dL    Nitrite, Urine NEGATIVE NEGATIVE    Leukocyte Esterase, Urine 500 Evi/µL (A) NEGATIVE   Microscopic Only, Urine   Result Value Ref Range    WBC, Urine >50 (A) 1-5, NONE /HPF    RBC, Urine >20 (A) NONE, 1-2, 3-5 /HPF    Bacteria, Urine 1+ (A) NONE SEEN /HPF   ECG 12 lead    Result Date: 11/9/2024  Atrial-paced rhythm with prolonged AV conduction Right bundle branch block Abnormal ECG When compared with ECG of 02-OCT-2024 10:58, (unconfirmed) No significant change was found See ED provider note for full interpretation and clinical correlation Confirmed by Rosa Rocha ([id number]) on 11/9/2024 11:48:27 AM    XR chest 1 view    Result Date: 11/7/2024  Interpreted By:  Marek Fetnon, STUDY: XR CHEST 1 VIEW   INDICATION: Signs/Symptoms:worsening hypoxia.   COMPARISON: November 5, 2024   ACCESSION NUMBER(S): [id number]   ORDERING CLINICIAN: ALFREDA  VAITKUS   FINDINGS: Cardiomegaly with pacemaker.   Interstitial changes throughout both lungs right worse than left favoring chronic interstitial disease.   Overall appearance unchanged from prior.         Interstitial changes throughout both lungs right worse than left favoring chronic interstitial disease.   Overall appearance unchanged from prior.   Signed by: Marek Fenton 11/7/2024 11:36 AM Dictation workstation:   [id number]    CT abdomen pelvis wo IV contrast    Result Date: 11/6/2024  Interpreted By:  Chase Rivera, STUDY: CT ABDOMEN PELVIS WO IV CONTRAST;  11/6/2024 2:59 pm   INDICATION: Signs/Symptoms:nephrolithiasis and history ogf Prostate carcinoma.     COMPARISON: 07/26/2023   ACCESSION NUMBER(S): [id number]   ORDERING CLINICIAN: YUDELKA HANKINS   TECHNIQUE: CT of the abdomen and pelvis was performed. Contiguous axial images were obtained at 3 mm slice thickness through the abdomen and pelvis. Coronal and sagittal reconstructions at 3 mm slice thickness were performed.  No intravenous contrast was administered.   FINDINGS: Please note that the evaluation of vessels, lymph nodes and organs is limited without intravenous contrast.   LOWER CHEST: The included lung bases demonstrate bibasilar bronchiectasis. Bibasilar infiltrates and interstitial thickening. Findings have progressed slightly since the prior exam. Cardiac pacer.   ABDOMEN:   LIVER: Grossly unremarkable. No definite focal liver lesions.   BILE DUCTS: No bile duct dilatation.   GALLBLADDER: Suboptimally distended and evaluated. No definite gallstones.   PANCREAS: Unremarkable pancreas.   SPLEEN: Unremarkable spleen.   ADRENAL GLANDS: Grossly unremarkable.   KIDNEYS AND URETERS: Cortical thinning of the left kidney with severe left-sided hydronephrosis and hydroureter. Nonobstructing left renal stones. There is a tiny stone within the dependent portion of the dilated distal left ureter measuring approximately 4.5 mm. Also another punctate 2  mm stone near the left UVJ. The larger previously noted distal left ureteral stone is no longer seen.   PELVIS:   BLADDER: Grossly unremarkable.   REPRODUCTIVE ORGANS: Calcifications within the prostate.       ABDOMINAL WALL: Fat containing left inguinal hernia.   BONES: Discogenic degenerative changes. Scoliosis.   No ascites.No retroperitoneal adenopathy.       1.  Persistent severe left-sided hydronephrosis and hydroureter with cortical thinning of the left kidney. However previously noted larger distal left ureteral stone has resolved with residual much smaller 4 mm stone in the distal left ureter as described. 2. Worsening bibasilar bronchiectasis and interstitial thickening and ground-glass infiltrates.     MACRO: None   Signed by: Chase Rivera 11/6/2024 4:17 PM Dictation workstation:   [id number]    XR chest 2 views    Result Date: 11/5/2024  Interpreted By:  Pepe Scruggs, STUDY: XR CHEST 2 VIEWS;  11/5/2024 7:00 pm   INDICATION: Signs/Symptoms:cough.     COMPARISON: Radiographs of the chest dated 08/25/2024.   ACCESSION NUMBER(S): [id number]   ORDERING CLINICIAN: GEORGE LOPEZ   FINDINGS: PA and lateral radiographs of the chest were provided.   Dual lead left subclavian pacemaker is in place.   CARDIOMEDIASTINAL SILHOUETTE: Cardiomediastinal silhouette is mildly enlarged, similar in appearance to prior exam.   LUNGS: There is redemonstration of the interstitial prominence in the lungs bilaterally, likely representing component of chronic interstitial lung disease, with some increase in airspace opacities in the right upper lung compared to prior study on 08/24/2024. No evidence of pneumothorax or sizable pleural effusion.   ABDOMEN: No remarkable upper abdominal findings.   BONES: No acute osseous changes.       1.  Somewhat increased interstitial markings in the right upper lung compared to prior exam on 08/25/2024, possibly representing developing airspace infiltrate. 2. Prominence of the  interstitial markings in the lungs bilaterally, likely representing component of chronic interstitial lung disease.   MACRO: None   Signed by: Pepe Scruggs 11/5/2024 7:12 PM Dictation workstation:   [id number]                  Assessment/Plan   Assessment & Plan  Pneumonia of right upper lobe due to infectious organism  79-year-old gentleman with history of multiple sclerosis, without significant neurological deficit, history of previous permanent pacemaker placement, hypertension, recent COVID infection about 2 months ago, interstitial lung disease, chronic smoking which he quit about a month ago, history of prostate carcinoma in the past s/p radiation treatment and history of BPH and nephrolithiasis, patient is on home oxygen for interstitial lung disease, presents with increasing shortness of breath and cough.  He normally uses 2 to 3 L of nasal cannula oxygen at home.  His chest x-ray is consistent with interstitial lung disease, although he is admitted with a diagnosis of pneumonia but chest x-ray revealed no evidence of consolidation.  He has had a high-resolution CT in September 2024 which is consistent with interstitial lung disease and significant emphysema.    1.  Acute exacerbation of chronic lung disease with wheezing and shortness of breath and questionable pneumonic infiltrate and history of interstitial lung disease.  White cell count is normal, chest x-ray revealed interstitial lung disease without definite consolidation.  Patient was on IV ceftriaxone and azithromycin, changed to Zosyn on 11/8/2024.  He does have a history of smoking and there is some element of COPD exacerbation and received a tapering dose of Solu-Medrol.   Pulmonary on board.  To continue with oxygen to maintain his saturation above 92%.  Oxygen increased to 5 L nasal cannula.  Seen by palliative care and family has agreed for hospice care, patient will be discharged to hospice care on November 13, 2024.    2.  Low  normal blood pressures,Will monitor blood pressure closely      3.  History of COVID infection few months ago currently has no symptoms related to COVID.    4.  Chronic kidney disease stage IV with a serum potassium of 5.6 and serum creatinine of 4.6 on admission.  Hyperkalemia has been treated and repeat potassium is 5.0 today.  Patient follows with Dr. Higgins as an outpatient.  No significant change in renal function, remains elevated BUN and creatinine.  Nephrology on board    5.  Previous history of prostate carcinoma s/p radiation and history of BPH and nephrolithiasis, patient had taken a second urological opinion at [place] and  and has been following with Dr. Buckner,at  .   CT of abdomen &pelvis  on 11/7/24:  Persistent severe left-sided hydronephrosis and hydroureter with cortical thinning of the left kidney. However previously noted larger distal left ureteral stone has resolved with residual much smaller 4 mm stone in the distal left ureter as described. 2. Worsening bibasilar bronchiectasis and interstitial thickening and ground-glass infiltrates.     6.  Hyperlipidemia continue with current medications    Reviewed all labs and imaging studies and discussed the plan of treatment with patient in detail.  Discussed with patient's family, his  wife and daughter was in the room and they are aware of patient's worsening condition and evaluate for hospice care.  Acute pulmonary edema    Acute on chronic hypoxic respiratory failure (Multi)    Interstitial lung disease (Multi)    Acute metabolic encephalopathy           I spent 35 minutes in the professional and overall care of this patient.      Rome Kerns MD

## 2024-11-12 NOTE — CARE PLAN
Problem: Pain - Adult  Goal: Verbalizes/displays adequate comfort level or baseline comfort level  Outcome: Progressing     Problem: Safety - Adult  Goal: Free from fall injury  Outcome: Progressing     Problem: Discharge Planning  Goal: Discharge to home or other facility with appropriate resources  Outcome: Progressing     Problem: Skin  Goal: Prevent/minimize sheer/friction injuries  Outcome: Progressing  Flowsheets (Taken 11/12/2024 9433)  Prevent/minimize sheer/friction injuries: HOB 30 degrees or less   The patient's goals for the shift include sleep    The clinical goals for the shift include pt will maintain Spo2 level of or above 92% throughout the shift    Over the shift, the patient did not make progress toward the following goals. Barriers to progression include . Recommendations to address these barriers include .

## 2024-11-12 NOTE — PROGRESS NOTES
11/12/2024 11:03 AM I spoke with patient's wife. She is interested in hospice care and having [facility]. Qiana CORDERO

## 2024-11-12 NOTE — PROGRESS NOTES
Antonio Willis is a 79 y.o. male on day 7 of admission presenting with Pneumonia of right upper lobe due to infectious organism.      Subjective   Patient seen and examined.   Awake. Coarse breath sounds with labored breathing. Somewhat restless.  Chart/labs/meds/notes/imaging/VS reviewed.    Objective        Vitals 24HR  Heart Rate:  [61-71]   Temp:  [35.7 °C (96.3 °F)-36.5 °C (97.7 °F)]   Resp:  [14-20]   BP: ()/(58-77)   SpO2:  [89 %-98 %]     Intake/Output last 3 Shifts:    Intake/Output Summary (Last 24 hours) at 11/12/2024 1315  Last data filed at 11/12/2024 0700  Gross per 24 hour   Intake 0 ml   Output --   Net 0 ml       Physical Exam  Constitutional: Arousable.    Eyes: PERRL, EOMI, clear sclera   ENMT: mucous membranes moist, no apparent injury, On 5-6 L NC   Head/Neck: Neck supple, no apparent injury, No JVD, trachea midline   Respiratory/Thorax: Bibasilar coarse breath sounds.   Cardiovascular: Regular, rate and rhythm,  2+ equal  pulses of the extremities, normal S 1and S 2   Gastrointestinal: Nondistended, soft, non-tender, no rebound tenderness or guarding, +BS   Musculoskeletal: Moves all 4 extremities. No joint swelling.   Extremities: No edema.   Neurological: Awake. No focal neurological deficits   Psychological: Appropriate mood and behavior   Skin: Warm and dry      Scheduled Medications  cholecalciferol, 1,000 Units, oral, Daily  famotidine, 10 mg, oral, q48h  heparin (porcine), 5,000 Units, subcutaneous, q8h PAULETTE  ipratropium-albuteroL, 3 mL, nebulization, TID  melatonin, 4.5 mg, oral, q PM  OLANZapine, 2.5 mg, oral, Nightly  oxygen, , inhalation, Continuous - Inhalation  piperacillin-tazobactam, 2.25 g, intravenous, q6h  polyethylene glycol, 17 g, oral, Daily  rosuvastatin, 10 mg, oral, Nightly  sodium bicarbonate, 650 mg, oral, TID  tamsulosin, 0.4 mg, oral, Nightly      Continuous medications       PRN medications: acetaminophen, ipratropium-albuteroL, ondansetron **OR** ondansetron      Relevant Results  Results from last 7 days   Lab Units 11/12/24 0515 11/11/24  0512 11/10/24  0720 11/08/24  0810 11/07/24  0516 11/05/24  1828   WBC AUTO x10*3/uL 10.0 9.0 12.4* 15.2* 7.3 8.8   HEMOGLOBIN g/dL 11.2* 11.0* 11.0* 10.4* 10.3* 11.8*   HEMATOCRIT % 35.2* 35.2* 35.7* 33.3* 33.2* 37.8*   PLATELETS AUTO x10*3/uL 100* 103* 93* 104* 94* 117*   NEUTROS PCT AUTO %  --   --   --  87.6 94.1 77.4   LYMPHS PCT AUTO %  --   --   --  4.7 4.7 11.7   MONOS PCT AUTO %  --   --   --  5.9 0.4 6.2   EOS PCT AUTO %  --   --   --  0.1 0.0 3.7     Results from last 7 days   Lab Units 11/12/24  0515 11/11/24  0512 11/10/24  0719   SODIUM mmol/L 151* 148* 146*   POTASSIUM mmol/L 5.3 5.0 5.0   CHLORIDE mmol/L 123* 120* 120*   CO2 mmol/L 20* 20* 17*   BUN mg/dL 80* 71* 76*   CREATININE mg/dL 4.14* 3.87* 4.13*   GLUCOSE mg/dL 81 81 192*   CALCIUM mg/dL 8.8 8.8 8.8       XR chest 1 view   Final Result        Interstitial changes throughout both lungs right worse than left   favoring chronic interstitial disease.        Overall appearance unchanged from prior.        Signed by: Marek Fenton 11/7/2024 11:36 AM   Dictation workstation:   [id number]      CT abdomen pelvis wo IV contrast   Final Result   1.  Persistent severe left-sided hydronephrosis and hydroureter with   cortical thinning of the left kidney. However previously noted larger   distal left ureteral stone has resolved with residual much smaller 4   mm stone in the distal left ureter as described.   2. Worsening bibasilar bronchiectasis and interstitial thickening and   ground-glass infiltrates.             MACRO:   None        Signed by: Chase Rivera 11/6/2024 4:17 PM   Dictation workstation:   [id number]      XR chest 2 views   Final Result   1.  Somewhat increased interstitial markings in the right upper lung   compared to prior exam on 08/25/2024, possibly representing   developing airspace infiltrate.   2. Prominence of the interstitial markings in the  lungs bilaterally,   likely representing component of chronic interstitial lung disease.        MACRO:   None        Signed by: Pepe Scruggs 11/5/2024 7:12 PM   Dictation workstation:   [id number]               Assessment/Plan        Antonio Willis is a 79 y.o. male with a PMHx of recurrent nephrolithiasis, hyperlipidemia, prostate adenocarcinoma, multiple sclerosis, sinus node dysfunction status post dual chamber pacemaker placement, bifascicular block, history of GI bleed with gastric and duodenal ulcers, and stage G5 chronic kidney disease who is under the care of Dr. Higgins.     Mr. Willis has advanced stage G5 chronic kidney disease with presumably solitary renal function having the chronic left hydronephrosis.  He is noted to have a recent history of COVID in August.  He comes in now with shortness of breath, a productive cough with yellowish tinged mucus production. In the ED, reportedly his pulse ox was 70% on his usual 2 L/min nasal cannula.  This improved to 88% with a increase to 3 L.  Chest plain film showed increased interstitial right upper lung markings from 8/25/2024.  Thus he was admitted for pneumonia. There were bibasilar bronchiectasis and interstitial thickening and groundglass on CT imaging.  Left hydronephrosis is again noted. The previous large left distal ureteral stone has resolved.  Fortunately his renal function is stable. He was otherwise noted to have hypernatremia as he is on the modified diet with thickened liquids now given his aspiration risk.  His sodium previously corrected with D5 water albeit is now trending higher.  Palliative is following, his CODE STATUS was changed to DNR/DNI.  Family had discussed Dobbhoff versus PEG tube placement albeit they may not be headed in that direction. Unfortunately, his hypernatremia will continue to be an issue in the current clinical setting.  He has a stable acidosis on sodium bicarbonate. Family now appears to be moving in the  direction of hospice. I will give him a 1 x dose of IVP Lasix 80 mg to assist with respiratory distress. Will follow along with goals of care discussion.       Principal Problem:    Pneumonia of right upper lobe due to infectious organism  Active Problems:    Acute pulmonary edema    Acute on chronic hypoxic respiratory failure (Multi)    Interstitial lung disease (Multi)    Acute metabolic encephalopathy       I spent 35 minutes in the professional and overall care of this patient.      Hola Braun, DO

## 2024-11-12 NOTE — CARE PLAN
The patient's goals for the shift include comfort    The clinical goals for the shift include Pt will maintan SpO2 level of 92% or above this shift      Problem: Pain - Adult  Goal: Verbalizes/displays adequate comfort level or baseline comfort level  Outcome: Progressing     Problem: Safety - Adult  Goal: Free from fall injury  Outcome: Progressing     Problem: Discharge Planning  Goal: Discharge to home or other facility with appropriate resources  Outcome: Progressing     Problem: Chronic Conditions and Co-morbidities  Goal: Patient's chronic conditions and co-morbidity symptoms are monitored and maintained or improved  Outcome: Progressing     Problem: Skin  Goal: Prevent/manage excess moisture  Outcome: Progressing  Flowsheets (Taken 11/12/2024 1137)  Prevent/manage excess moisture:   Cleanse incontinence/protect with barrier cream   Monitor for/manage infection if present  Goal: Prevent/minimize sheer/friction injuries  Outcome: Progressing  Flowsheets (Taken 11/12/2024 1137)  Prevent/minimize sheer/friction injuries:   HOB 30 degrees or less   Turn/reposition every 2 hours/use positioning/transfer devices   Use pull sheet

## 2024-11-12 NOTE — ASSESSMENT & PLAN NOTE
79-year-old gentleman with history of multiple sclerosis, without significant neurological deficit, history of previous permanent pacemaker placement, hypertension, recent COVID infection about 2 months ago, interstitial lung disease, chronic smoking which he quit about a month ago, history of prostate carcinoma in the past s/p radiation treatment and history of BPH and nephrolithiasis, patient is on home oxygen for interstitial lung disease, presents with increasing shortness of breath and cough.  He normally uses 2 to 3 L of nasal cannula oxygen at home.  His chest x-ray is consistent with interstitial lung disease, although he is admitted with a diagnosis of pneumonia but chest x-ray revealed no evidence of consolidation.  He has had a high-resolution CT in September 2024 which is consistent with interstitial lung disease and significant emphysema.    1.  Acute exacerbation of chronic lung disease with wheezing and shortness of breath and questionable pneumonic infiltrate and history of interstitial lung disease.  White cell count is normal, chest x-ray revealed interstitial lung disease without definite consolidation.  Patient was on IV ceftriaxone and azithromycin, changed to Zosyn on 11/8/2024.  He does have a history of smoking and there is some element of COPD exacerbation and received a tapering dose of Solu-Medrol.   Pulmonary on board.  To continue with oxygen to maintain his saturation above 92%.  Oxygen increased to 5 L nasal cannula.  Seen by palliative care and family has agreed for hospice care, patient will be discharged to hospice care on November 13, 2024.    2.  Low normal blood pressures,Will monitor blood pressure closely      3.  History of COVID infection few months ago currently has no symptoms related to COVID.    4.  Chronic kidney disease stage IV with a serum potassium of 5.6 and serum creatinine of 4.6 on admission.  Hyperkalemia has been treated and repeat potassium is 5.0 today.   Patient follows with Dr. Higgins as an outpatient.  No significant change in renal function, remains elevated BUN and creatinine.  Nephrology on board    5.  Previous history of prostate carcinoma s/p radiation and history of BPH and nephrolithiasis, patient had taken a second urological opinion at [place] and  and has been following with Dr. Buckner,at  .   CT of abdomen &pelvis  on 11/7/24:  Persistent severe left-sided hydronephrosis and hydroureter with cortical thinning of the left kidney. However previously noted larger distal left ureteral stone has resolved with residual much smaller 4 mm stone in the distal left ureter as described. 2. Worsening bibasilar bronchiectasis and interstitial thickening and ground-glass infiltrates.     6.  Hyperlipidemia continue with current medications    Reviewed all labs and imaging studies and discussed the plan of treatment with patient in detail.  Discussed with patient's family, his  wife and daughter was in the room and they are aware of patient's worsening condition and evaluate for hospice care.

## 2024-11-12 NOTE — PROGRESS NOTES
Physical Therapy                 Therapy Communication Note    Patient Name: Antonio Willis  MRN: [id number]  Department: [place] [address]  Room: [address] [place] [address]  Today's Date: 11/12/2024     Discipline: Physical Therapy    Missed Visit Reason: Missed Visit Reason:  (pt with RT at this time)    Missed Time: Attempt    Comment:

## 2024-11-12 NOTE — PROGRESS NOTES
"Antonio Willis is a 79 y.o. male on day 7 of admission presenting with Pneumonia of right upper lobe due to infectious organism.    Subjective   Symptoms (0 - 10, Best to Worst)  [place] Symptom Assessment System  0-10 (Numeric) Pain Score: 0 - No pain  Destaurated to 86% overnight, was agitated and declined nebulizer treatment. Did not receive PRN olanzapine.   Off steroids. Family was concerned this might be inciting his agitation.     Family discussed care with Dr. Higgins, patients OP nephrologist and family friend; they would like to sign on to hospice with [facility]. He is symptomatic with agitation, restlessness, dyspnea and they will be unable to manage this at home.        Objective     Physical Exam    Physical Exam  Constitutional:       Comments: Confused, picking at blanket. More difficult. Asking or trying to get out of bed.   HENT:      Head: Normocephalic and atraumatic.   Eyes:      Extraocular Movements: Extraocular movements intact.      Pupils: Pupils are equal, round, and reactive to light.   Cardiovascular:      Rate and Rhythm: Normal rate and regular rhythm.      Heart sounds: Normal heart sounds.   Pulmonary:      Effort: Pulmonary effort is normal.      Breath sounds: Normal breath sounds.   Abdominal:      General: Abdomen is flat. Bowel sounds are normal. There is no distension.      Palpations: Abdomen is soft.      Tenderness: There is no abdominal tenderness.   Musculoskeletal:         General: No swelling.      Cervical back: Normal range of motion and neck supple.   Skin:     General: Skin is warm and dry.   Neurological:      Mental Status: He is disoriented.      Comments: Confused, delirious. Visual hallucinations.     Last Recorded Vitals  Blood pressure 116/60, pulse 70, temperature 36.4 °C (97.5 °F), temperature source Temporal, resp. rate 14, height 1.651 m (5' 5\"), weight 76.7 kg (169 lb), SpO2 (!) 89%.  Intake/Output last 3 Shifts:  I/O last 3 completed shifts:  In: 330 (4.3 " mL/kg) [P.O.:230; IV Piggyback:100]  Out: 600 (7.8 mL/kg) [Urine:600 (0.2 mL/kg/hr)]  Weight: 76.7 kg     Wt Readings from Last 10 Encounters:   11/05/24 76.7 kg (169 lb)   10/10/24 76.7 kg (169 lb)   10/02/24 76.7 kg (169 lb)   09/18/24 75.8 kg (167 lb)   08/25/24 75.8 kg (167 lb)   06/13/24 79.9 kg (176 lb 1.6 oz)   08/15/23 79 kg (174 lb 0.9 oz)   07/20/22 79.8 kg (176 lb)   11/08/21 80.7 kg (178 lb)   07/21/21 78 kg (172 lb 0.8 oz)         Relevant Results  Scheduled medications  cholecalciferol, 1,000 Units, oral, Daily  famotidine, 10 mg, oral, q48h  heparin (porcine), 5,000 Units, subcutaneous, q8h PAULETTE  ipratropium-albuteroL, 3 mL, nebulization, TID  melatonin, 4.5 mg, oral, q PM  methylPREDNISolone sodium succinate (PF), 10 mg, intravenous, Daily  OLANZapine, 2.5 mg, oral, Nightly  oxygen, , inhalation, Continuous - Inhalation  piperacillin-tazobactam, 2.25 g, intravenous, q6h  polyethylene glycol, 17 g, oral, Daily  rosuvastatin, 10 mg, oral, Nightly  sodium bicarbonate, 650 mg, oral, TID  tamsulosin, 0.4 mg, oral, Nightly      Continuous medications     PRN medications  PRN medications: acetaminophen, ipratropium-albuteroL, OLANZapine, ondansetron **OR** ondansetron    ECG 12 lead    Result Date: 11/9/2024  Atrial-paced rhythm with prolonged AV conduction Right bundle branch block Abnormal ECG When compared with ECG of 02-OCT-2024 10:58, (unconfirmed) No significant change was found See ED provider note for full interpretation and clinical correlation Confirmed by Rosa Rocha ([id number]) on 11/9/2024 11:48:27 AM    XR chest 1 view    Result Date: 11/7/2024  Interpreted By:  Marek Fenton, STUDY: XR CHEST 1 VIEW   INDICATION: Signs/Symptoms:worsening hypoxia.   COMPARISON: November 5, 2024   ACCESSION NUMBER(S): [id number]   ORDERING CLINICIAN: ALFREDA HORN   FINDINGS: Cardiomegaly with pacemaker.   Interstitial changes throughout both lungs right worse than left favoring chronic interstitial disease.    Overall appearance unchanged from prior.         Interstitial changes throughout both lungs right worse than left favoring chronic interstitial disease.   Overall appearance unchanged from prior.   Signed by: Marek Fenton 11/7/2024 11:36 AM Dictation workstation:   [id number]    CT abdomen pelvis wo IV contrast    Result Date: 11/6/2024  Interpreted By:  Chase Rivera, STUDY: CT ABDOMEN PELVIS WO IV CONTRAST;  11/6/2024 2:59 pm   INDICATION: Signs/Symptoms:nephrolithiasis and history ogf Prostate carcinoma.     COMPARISON: 07/26/2023   ACCESSION NUMBER(S): [id number]   ORDERING CLINICIAN: YUDELKA HANKINS   TECHNIQUE: CT of the abdomen and pelvis was performed. Contiguous axial images were obtained at 3 mm slice thickness through the abdomen and pelvis. Coronal and sagittal reconstructions at 3 mm slice thickness were performed.  No intravenous contrast was administered.   FINDINGS: Please note that the evaluation of vessels, lymph nodes and organs is limited without intravenous contrast.   LOWER CHEST: The included lung bases demonstrate bibasilar bronchiectasis. Bibasilar infiltrates and interstitial thickening. Findings have progressed slightly since the prior exam. Cardiac pacer.   ABDOMEN:   LIVER: Grossly unremarkable. No definite focal liver lesions.   BILE DUCTS: No bile duct dilatation.   GALLBLADDER: Suboptimally distended and evaluated. No definite gallstones.   PANCREAS: Unremarkable pancreas.   SPLEEN: Unremarkable spleen.   ADRENAL GLANDS: Grossly unremarkable.   KIDNEYS AND URETERS: Cortical thinning of the left kidney with severe left-sided hydronephrosis and hydroureter. Nonobstructing left renal stones. There is a tiny stone within the dependent portion of the dilated distal left ureter measuring approximately 4.5 mm. Also another punctate 2 mm stone near the left UVJ. The larger previously noted distal left ureteral stone is no longer seen.   PELVIS:   BLADDER: Grossly unremarkable.    REPRODUCTIVE ORGANS: Calcifications within the prostate.       ABDOMINAL WALL: Fat containing left inguinal hernia.   BONES: Discogenic degenerative changes. Scoliosis.   No ascites.No retroperitoneal adenopathy.       1.  Persistent severe left-sided hydronephrosis and hydroureter with cortical thinning of the left kidney. However previously noted larger distal left ureteral stone has resolved with residual much smaller 4 mm stone in the distal left ureter as described. 2. Worsening bibasilar bronchiectasis and interstitial thickening and ground-glass infiltrates.     MACRO: None   Signed by: Chase Rivera 11/6/2024 4:17 PM Dictation workstation:   [id number]    XR chest 2 views    Result Date: 11/5/2024  Interpreted By:  Pepe Scruggs, STUDY: XR CHEST 2 VIEWS;  11/5/2024 7:00 pm   INDICATION: Signs/Symptoms:cough.     COMPARISON: Radiographs of the chest dated 08/25/2024.   ACCESSION NUMBER(S): [id number]   ORDERING CLINICIAN: GEORGE LOPEZ   FINDINGS: PA and lateral radiographs of the chest were provided.   Dual lead left subclavian pacemaker is in place.   CARDIOMEDIASTINAL SILHOUETTE: Cardiomediastinal silhouette is mildly enlarged, similar in appearance to prior exam.   LUNGS: There is redemonstration of the interstitial prominence in the lungs bilaterally, likely representing component of chronic interstitial lung disease, with some increase in airspace opacities in the right upper lung compared to prior study on 08/24/2024. No evidence of pneumothorax or sizable pleural effusion.   ABDOMEN: No remarkable upper abdominal findings.   BONES: No acute osseous changes.       1.  Somewhat increased interstitial markings in the right upper lung compared to prior exam on 08/25/2024, possibly representing developing airspace infiltrate. 2. Prominence of the interstitial markings in the lungs bilaterally, likely representing component of chronic interstitial lung disease.   MACRO: None   Signed by:  Pepe Vitalemchuk 11/5/2024 7:12 PM Dictation workstation:   [id number]     Results for orders placed or performed during the hospital encounter of 11/05/24 (from the past 24 hours)   CBC   Result Value Ref Range    WBC 10.0 4.4 - 11.3 x10*3/uL    nRBC 0.4 (H) 0.0 - 0.0 /100 WBCs    RBC 3.43 (L) 4.50 - 5.90 x10*6/uL    Hemoglobin 11.2 (L) 13.5 - 17.5 g/dL    Hematocrit 35.2 (L) 41.0 - 52.0 %     (H) 80 - 100 fL    MCH 32.7 26.0 - 34.0 pg    MCHC 31.8 (L) 32.0 - 36.0 g/dL    RDW 19.4 (H) 11.5 - 14.5 %    Platelets 100 (L) 150 - 450 x10*3/uL   Basic Metabolic Panel   Result Value Ref Range    Glucose 81 74 - 99 mg/dL    Sodium 151 (H) 136 - 145 mmol/L    Potassium 5.3 3.5 - 5.3 mmol/L    Chloride 123 (H) 98 - 107 mmol/L    Bicarbonate 20 (L) 21 - 32 mmol/L    Anion Gap 13 10 - 20 mmol/L    Urea Nitrogen 80 (H) 6 - 23 mg/dL    Creatinine 4.14 (H) 0.50 - 1.30 mg/dL    eGFR 14 (L) >60 mL/min/1.73m*2    Calcium 8.8 8.6 - 10.3 mg/dL                      Assessment/Plan   IMP:   78 yo M with history of stable MS, CKD4, history of CVA/prostate CA s/p radiation, BPH, PPM in situ, interstitial lung disease in current smoker presenting with acute on chronic respiratory failure. Currently being managed for copd exacerbation, bacterial PNA and acute pulmonary edema. Goals are optimizing patient for discharge, possibly to SNF and then return home as long as possible. Controlling delirium is priority. She is aware of his decline but does not fully grasp how frail he is. Wife is coming to terms with the fact that she cannot take care of her . Plan is for DC to SNF and then probably LTC. CHRISTIAN on CKD progressing. Respiratory status tenuous with propensity to accumulate fluids on lung. He is stilla alicia his baseline oxygen requirement. Palliative is assisting with symptom management.     11/12/24  Conversation with patient's wife and children Daiana and Jarrell  They want to prioritize his comfort and alleviate suffering.  They are distressed to see him delirious and confused. They would like to pursue hospice and agreed to be admitted for inpatient symptom management.   They had spoken to Dorcas Atwood of [facility] and agreed for DNRCC with pivoting towards comfort model of care.   They are appreciative to treatment team for their help with their loved one.     11/11/24: Conversation with Wife, Emma, and dtr, Daiana:  We discussed PEG tube feedings at length and while they might secure nutrition but does not fix the root problem of an ineffective swallow reflex. We agreed that we should promote oral nutrition as much as possible.  She is coming to terms with the fact that she cannot provide enough care for him at home. She has her own health issues w/ multiple joint replacements and would not be able to lift him independently. They are seeking SNF and then LTC once that insurance begins paying out at the end of November.           Plan:  - Discharge to [facility]  -Agree with lasix for respiratory distress with change in direction of care towards hospice. Preserving residual kidney function likely without much benefit in expected timeframe patient will be on hospice.   - Bladder scan now   - Will start benzodiazapine for agitation, breathlessness.  Diazepam 5mg liquid q8h prn and haldol 1mg IV q4h prn   - DNRCC today after conversation with hospice.   - Olanzapine 2.5mg nightly    - Will continue to follow.      - Recommend Delirium/Dementia Precautions:       - Minimize use of benzos, opiates, anticholinergics, as these may worsen mental status       - Would use caution with narcotic pain medications       - Would still adequately controlling pain, as uncontrolled pain is also a risk factor for delirium       - Reinforce sleep hygiene; encourage patient to stay awake during the day       - Keep curtains/blinds open during the day and closed at night.       - Would recommend reorienting/redirecting patient as much as  possible,        - Aim for consistent staffing, familiar objects, avoiding bright lights and loud noises, etc.       - Can consider melatonin at night before bed.           Provider estimate of survival: 6+ months Barring unexpected event. I would not be surprised with an event within six months.   Patient Prognostic Awareness: Yes - incongruent with provider estimation     Patient/proxy preference for information  Prefers full information     Goals of Care  DNR-Comfort Care as of 11/12/2024       Is the patient hospice-eligible?   Yes  Was a discussion held re hospice services?   yes  Was a decision made re hospice services?  Agreed to hospice transfer. Family agreed to switch to comfort model of care      Other Palliative Support          I spent 50 minutes in the professional and overall care of this patient.  I spent 30 minutes in the professional and overall care of this patient related to ACP in addition to other time spent in assessment, chart review, and coordination of care          Marek Chao MD

## 2024-11-12 NOTE — PROGRESS NOTES
"Antonio Willis is a 79 y.o. male on day 7 of admission presenting with Pneumonia of right upper lobe due to infectious organism.    Subjective   The patient states that he feels \"terrible\".  He admits to shortness of breath but denies cough or pain.  He is refusing to keep his oxygen on.  He was switched from 6 L nasal cannula oxygen to 10 L high flow nasal cannula oxygen last night due to oxygen desaturation to 86%.  He is presently on 8 L high flow nasal cannula oxygen although it is not on his face presently.     Objective   Physical Exam  Vitals  Blood pressure 116/60, pulse 70, temperature 36.4 °C (97.5 °F), temperature source Temporal, resp. rate 14, height 1.651 m (5' 5\"), weight 76.7 kg (169 lb), SpO2 (!) 89%.  Intake/Output last 3 Shifts:  I/O last 3 completed shifts:  In: 330 (4.3 mL/kg) [P.O.:230; IV Piggyback:100]  Out: 600 (7.8 mL/kg) [Urine:600 (0.2 mL/kg/hr)]  Weight: 76.7 kg     General-awake, alert, agitated and confused.  Lungs-bilateral rhonchi without wheezes or rales.  Heart-regular rate and rhythm.  Abdomen-positive bowel sounds.  Extremities-no edema.  Skin-no rashes.    Scheduled medications  cholecalciferol, 1,000 Units, oral, Daily  famotidine, 10 mg, oral, q48h  heparin (porcine), 5,000 Units, subcutaneous, q8h PAULETTE  ipratropium-albuteroL, 3 mL, nebulization, TID  melatonin, 4.5 mg, oral, q PM  OLANZapine, 2.5 mg, oral, Nightly  oxygen, , inhalation, Continuous - Inhalation  piperacillin-tazobactam, 2.25 g, intravenous, q6h  polyethylene glycol, 17 g, oral, Daily  rosuvastatin, 10 mg, oral, Nightly  sodium bicarbonate, 650 mg, oral, TID  tamsulosin, 0.4 mg, oral, Nightly      Continuous medications     PRN medications  PRN medications: acetaminophen, ipratropium-albuteroL, OLANZapine, ondansetron **OR** ondansetron     Results for orders placed or performed during the hospital encounter of 11/05/24 (from the past 24 hours)   CBC   Result Value Ref Range    WBC 10.0 4.4 - 11.3 x10*3/uL    " nRBC 0.4 (H) 0.0 - 0.0 /100 WBCs    RBC 3.43 (L) 4.50 - 5.90 x10*6/uL    Hemoglobin 11.2 (L) 13.5 - 17.5 g/dL    Hematocrit 35.2 (L) 41.0 - 52.0 %     (H) 80 - 100 fL    MCH 32.7 26.0 - 34.0 pg    MCHC 31.8 (L) 32.0 - 36.0 g/dL    RDW 19.4 (H) 11.5 - 14.5 %    Platelets 100 (L) 150 - 450 x10*3/uL   Basic Metabolic Panel   Result Value Ref Range    Glucose 81 74 - 99 mg/dL    Sodium 151 (H) 136 - 145 mmol/L    Potassium 5.3 3.5 - 5.3 mmol/L    Chloride 123 (H) 98 - 107 mmol/L    Bicarbonate 20 (L) 21 - 32 mmol/L    Anion Gap 13 10 - 20 mmol/L    Urea Nitrogen 80 (H) 6 - 23 mg/dL    Creatinine 4.14 (H) 0.50 - 1.30 mg/dL    eGFR 14 (L) >60 mL/min/1.73m*2    Calcium 8.8 8.6 - 10.3 mg/dL      Assessment:  79-year-old man with interstitial lung disease, chronic hypoxia, chronic renal failure, chronic hydronephrosis, multiple sclerosis, COVID-19 infection, pacemaker, GI bleed, prostate cancer and renal and bladder stones, who was admitted with shortness of breath, cough, weakness and hypoxia, and found to have an abnormal chest x-ray suggestive of possible community-acquired pneumonia and/or an acute exacerbation of interstitial lung disease.  There are bilateral rhonchi suggestive of airway secretions.  Aspiration cannot be excluded.  There is no bronchospasm.  He has worsening (probable prerenal) kidney function due to dehydration.  He has significant agitation and probable dementia.    Recommend:  1.  Continue DuoNeb, Zosyn and subcutaneous heparin.  2.  Wean FiO2 to keep oxygen saturation approximately 92 to 95%; home oxygen evaluation prior to discharge.  3.  Incentive spirometry and Acapella treatment, if the patient is able to cooperate.  4.  Follow-up with his outpatient pulmonologist, Dr. Chacorta Dumont, after discharge.  5.  For Hospice evaluation.  6.  The patient is DNR/DNI.    Johnnie Spangler MD

## 2024-11-13 VITALS
TEMPERATURE: 94.7 F | DIASTOLIC BLOOD PRESSURE: 54 MMHG | HEIGHT: 65 IN | HEART RATE: 60 BPM | OXYGEN SATURATION: 98 % | WEIGHT: 169 LBS | SYSTOLIC BLOOD PRESSURE: 120 MMHG | BODY MASS INDEX: 28.16 KG/M2 | RESPIRATION RATE: 19 BRPM

## 2024-11-13 LAB
LEGIONELLA AG UR QL: NEGATIVE
S PNEUM AG UR QL: NEGATIVE

## 2024-11-13 PROCEDURE — 2500000004 HC RX 250 GENERAL PHARMACY W/ HCPCS (ALT 636 FOR OP/ED): Performed by: NURSE PRACTITIONER

## 2024-11-13 PROCEDURE — 99233 SBSQ HOSP IP/OBS HIGH 50: CPT | Performed by: INTERNAL MEDICINE

## 2024-11-13 PROCEDURE — 2500000004 HC RX 250 GENERAL PHARMACY W/ HCPCS (ALT 636 FOR OP/ED): Performed by: INTERNAL MEDICINE

## 2024-11-13 PROCEDURE — 2500000002 HC RX 250 W HCPCS SELF ADMINISTERED DRUGS (ALT 637 FOR MEDICARE OP, ALT 636 FOR OP/ED): Performed by: INTERNAL MEDICINE

## 2024-11-13 PROCEDURE — 94640 AIRWAY INHALATION TREATMENT: CPT

## 2024-11-13 PROCEDURE — 99239 HOSP IP/OBS DSCHRG MGMT >30: CPT | Performed by: INTERNAL MEDICINE

## 2024-11-13 RX ORDER — HYDROMORPHONE HYDROCHLORIDE 0.2 MG/ML
0.2 INJECTION INTRAMUSCULAR; INTRAVENOUS; SUBCUTANEOUS ONCE
Status: COMPLETED | OUTPATIENT
Start: 2024-11-13 | End: 2024-11-13

## 2024-11-13 RX ORDER — SODIUM BICARBONATE 650 MG/1
650 TABLET ORAL 3 TIMES DAILY
Start: 2024-11-13

## 2024-11-13 RX ORDER — POLYETHYLENE GLYCOL 3350 17 G/17G
17 POWDER, FOR SOLUTION ORAL DAILY
Start: 2024-11-13

## 2024-11-13 RX ORDER — OLANZAPINE 2.5 MG/1
2.5 TABLET ORAL NIGHTLY
Start: 2024-11-13

## 2024-11-13 RX ORDER — IPRATROPIUM BROMIDE AND ALBUTEROL SULFATE 2.5; .5 MG/3ML; MG/3ML
3 SOLUTION RESPIRATORY (INHALATION)
Start: 2024-11-13

## 2024-11-13 RX ORDER — ACETAMINOPHEN 500 MG
5 TABLET ORAL EVERY EVENING
Start: 2024-11-13

## 2024-11-13 RX ORDER — DIAZEPAM 5 MG/1
5 TABLET ORAL EVERY 8 HOURS PRN
Start: 2024-11-13

## 2024-11-13 RX ADMIN — HYDROMORPHONE HYDROCHLORIDE 0.2 MG: 0.2 INJECTION, SOLUTION INTRAMUSCULAR; INTRAVENOUS; SUBCUTANEOUS at 10:10

## 2024-11-13 RX ADMIN — PIPERACILLIN SODIUM AND TAZOBACTAM SODIUM 2.25 G: 2; .25 INJECTION, SOLUTION INTRAVENOUS at 06:09

## 2024-11-13 RX ADMIN — IPRATROPIUM BROMIDE AND ALBUTEROL SULFATE 3 ML: 2.5; .5 SOLUTION RESPIRATORY (INHALATION) at 07:19

## 2024-11-13 ASSESSMENT — COGNITIVE AND FUNCTIONAL STATUS - GENERAL
DRESSING REGULAR LOWER BODY CLOTHING: TOTAL
WALKING IN HOSPITAL ROOM: TOTAL
TOILETING: TOTAL
DAILY ACTIVITIY SCORE: 6
HELP NEEDED FOR BATHING: TOTAL
MOBILITY SCORE: 6
STANDING UP FROM CHAIR USING ARMS: TOTAL
WALKING IN HOSPITAL ROOM: TOTAL
STANDING UP FROM CHAIR USING ARMS: TOTAL
DRESSING REGULAR UPPER BODY CLOTHING: TOTAL
DRESSING REGULAR UPPER BODY CLOTHING: TOTAL
CLIMB 3 TO 5 STEPS WITH RAILING: TOTAL
PERSONAL GROOMING: TOTAL
DAILY ACTIVITIY SCORE: 6
EATING MEALS: TOTAL
MOBILITY SCORE: 6
EATING MEALS: TOTAL
MOVING TO AND FROM BED TO CHAIR: TOTAL
TURNING FROM BACK TO SIDE WHILE IN FLAT BAD: TOTAL
TOILETING: TOTAL
HELP NEEDED FOR BATHING: TOTAL
MOVING FROM LYING ON BACK TO SITTING ON SIDE OF FLAT BED WITH BEDRAILS: TOTAL
PERSONAL GROOMING: TOTAL
CLIMB 3 TO 5 STEPS WITH RAILING: TOTAL
TURNING FROM BACK TO SIDE WHILE IN FLAT BAD: TOTAL
DRESSING REGULAR LOWER BODY CLOTHING: TOTAL
MOVING FROM LYING ON BACK TO SITTING ON SIDE OF FLAT BED WITH BEDRAILS: TOTAL
MOVING TO AND FROM BED TO CHAIR: TOTAL

## 2024-11-13 ASSESSMENT — PAIN SCALES - PAIN ASSESSMENT IN ADVANCED DEMENTIA (PAINAD)
CONSOLABILITY: NO NEED TO CONSOLE
TOTALSCORE: 2
FACIALEXPRESSION: SMILING OR INEXPRESSIVE
CONSOLABILITY: NO NEED TO CONSOLE
FACIALEXPRESSION: SMILING OR INEXPRESSIVE
BODYLANGUAGE: RELAXED
TOTALSCORE: 1
BREATHING: NOISY LABORED BREATHING, LONG PERIODS OF HYPERVENTILATION, CHEYNE-STOKES RESPIRATIONS
BODYLANGUAGE: RELAXED
TOTALSCORE: MEDICATION (SEE MAR)
BREATHING: OCCASIONAL LABORED BREATHING, SHORT PERIOD OF HYPERVENTILATION
TOTALSCORE: REPOSITIONED

## 2024-11-13 ASSESSMENT — PAIN - FUNCTIONAL ASSESSMENT
PAIN_FUNCTIONAL_ASSESSMENT: PAINAD (PAIN ASSESSMENT IN ADVANCED DEMENTIA SCALE)
PAIN_FUNCTIONAL_ASSESSMENT: PAINAD (PAIN ASSESSMENT IN ADVANCED DEMENTIA SCALE)

## 2024-11-13 NOTE — CARE PLAN
The patient's goals for the shift include sleep    The clinical goals for the shift include Pt will maintan SpO2 level of 92% or above this shift      Problem: Pain - Adult  Goal: Verbalizes/displays adequate comfort level or baseline comfort level  Outcome: Progressing     Problem: Safety - Adult  Goal: Free from fall injury  Outcome: Progressing     Problem: Discharge Planning  Goal: Discharge to home or other facility with appropriate resources  Outcome: Progressing     Problem: Chronic Conditions and Co-morbidities  Goal: Patient's chronic conditions and co-morbidity symptoms are monitored and maintained or improved  Outcome: Progressing     Problem: Skin  Goal: Prevent/manage excess moisture  Outcome: Progressing  Goal: Prevent/minimize sheer/friction injuries  Outcome: Progressing  Goal: Promote/optimize nutrition  Outcome: Progressing  Goal: Promote skin healing  Outcome: Progressing     Problem: Fall/Injury  Goal: Not fall by end of shift  Outcome: Progressing  Goal: Be free from injury by end of the shift  Outcome: Progressing  Goal: Verbalize understanding of personal risk factors for fall in the hospital  Outcome: Progressing  Goal: Verbalize understanding of risk factor reduction measures to prevent injury from fall in the home  Outcome: Progressing  Goal: Use assistive devices by end of the shift  Outcome: Progressing  Goal: Pace activities to prevent fatigue by end of the shift  Outcome: Progressing

## 2024-11-13 NOTE — DISCHARGE SUMMARY
ADMISSION DATE: 11/5/2024     DISCHARGE DATE:  11/13/24     Discharge Diagnosis  Pneumonia of right upper lobe due to infectious organism  Interstitial lung disease  Persistent hypoxia  COPD  Recent COVID infection about 2 months prior to admission  Chronic kidney disease stage IV  Hyponatremia and hypochloremia  Status post permanent pacemaker placement  Hypertension    Issues Requiring Follow-Up  Discharged to hospice care.        Discharge Meds     Your medication list        START taking these medications        Instructions Last Dose Given Next Dose Due   diazePAM 5 mg tablet  Commonly known as: Valium      Take 1 tablet (5 mg) by mouth every 8 hours if needed (Restlessness, breathlessness.).       ipratropium-albuteroL 0.5-2.5 mg/3 mL nebulizer solution  Commonly known as: Duo-Neb      Take 3 mL by nebulization 3 times a day.       melatonin 5 mg tablet      Take 1 tablet (5 mg) by mouth once daily in the evening.       OLANZapine 2.5 mg tablet  Commonly known as: ZyPREXA      Take 1 tablet (2.5 mg) by mouth once daily at bedtime.       oxygen gas therapy  Commonly known as: O2      Inhale 1 each every 12 hours.       polyethylene glycol 17 gram packet  Commonly known as: Glycolax, Miralax      Take 17 g by mouth once daily.       sodium bicarbonate 650 mg tablet      Take 1 tablet (650 mg) by mouth 3 times a day.              CONTINUE taking these medications        Instructions Last Dose Given Next Dose Due   acetaminophen 325 mg tablet  Commonly known as: Tylenol           albuterol 90 mcg/actuation inhaler      Inhale 2 puffs every 4 hours if needed for wheezing or shortness of breath (You may also use this 10-15 minutes prior to exertional activity as needed).       albuterol 2.5 mg /3 mL (0.083 %) nebulizer solution      Take 3 mL (2.5 mg) by nebulization 2 times a day.       Centrum Silver 0.4 mg-300 mcg- 250 mcg  Generic drug: multivitamin with minerals iron-free           cholecalciferol 25 MCG (1000  UT) tablet  Commonly known as: Vitamin D-3           famotidine 20 mg tablet  Commonly known as: Pepcid           oxygen gas therapy  Commonly known as: O2      Inhale 2 L/min continuously.       tamsulosin 0.4 mg 24 hr capsule  Commonly known as: Flomax                  STOP taking these medications      ferrous sulfate (325 mg ferrous sulfate) tablet        rosuvastatin 10 mg tablet  Commonly known as: Crestor                  Where to Get Your Medications        Information about where to get these medications is not yet available    Ask your nurse or doctor about these medications  diazePAM 5 mg tablet  ipratropium-albuteroL 0.5-2.5 mg/3 mL nebulizer solution  melatonin 5 mg tablet  OLANZapine 2.5 mg tablet  oxygen gas therapy  polyethylene glycol 17 gram packet  sodium bicarbonate 650 mg tablet             No results found for this or any previous visit (from the past 24 hours).         Hospital Course   This is a 79-year-old gentleman with history of interstitial lung disease, hypertension, previous history of smoking until a month ago, COPD and emphysema was admitted with acute respiratory distress and hypoxia and pneumonia and has been treated with IV antibiotics without much improvement.  Patient remains hypoxic and on 5 L nasal cannula oxygen.  His general condition has worsened and he is showing not any improvement with aggressive medical therapy.  Patient has been seen by cardiology, pulmonary and nephrology during his stay in the hospital.  Patient also has chronic kidney disease stage IV.  Palliative care consult was obtained and family has requested hospice care and hospice consult was completed this afternoon and after detailed discussion family has decided for patient to be discharged to hospice care on November 13, 2024.    Pneumonia of right upper lobe due to infectious organism  79-year-old gentleman with history of multiple sclerosis, without significant neurological deficit, history of previous  permanent pacemaker placement, hypertension, recent COVID infection about 2 months ago, interstitial lung disease, chronic smoking which he quit about a month ago, history of prostate carcinoma in the past s/p radiation treatment and history of BPH and nephrolithiasis, patient is on home oxygen for interstitial lung disease, presents with increasing shortness of breath and cough.  He normally uses 2 to 3 L of nasal cannula oxygen at home.  His chest x-ray is consistent with interstitial lung disease, although he is admitted with a diagnosis of pneumonia but chest x-ray revealed no evidence of consolidation.  He has had a high-resolution CT in September 2024 which is consistent with interstitial lung disease and significant emphysema.     1.  Acute exacerbation of chronic lung disease with wheezing and shortness of breath and questionable pneumonic infiltrate and history of interstitial lung disease.  White cell count is normal, chest x-ray revealed interstitial lung disease without definite consolidation.  Patient was on IV ceftriaxone and azithromycin, changed to Zosyn on 11/8/2024.  He does have a history of smoking and there is some element of COPD exacerbation and received a tapering dose of Solu-Medrol.   Pulmonary on board.  To continue with oxygen to maintain his saturation above 92%.  Oxygen increased to 5 L nasal cannula.  Seen by palliative care and family has agreed for hospice care, patient will be discharged to hospice care on November 13, 2024.     2.  Low normal blood pressures,Will monitor blood pressure closely        3.  History of COVID infection few months ago currently has no symptoms related to COVID.     4.  Chronic kidney disease stage IV with a serum potassium of 5.6 and serum creatinine of 4.6 on admission.  Hyperkalemia has been treated and repeat potassium is 5.0 today.  Patient follows with Dr. Higgins as an outpatient.  No significant change in renal function, remains elevated BUN and  "creatinine.  Nephrology on board     5.  Previous history of prostate carcinoma s/p radiation and history of BPH and nephrolithiasis, patient had taken a second urological opinion at [place] and  and has been following with Dr. Buckner,at  .   CT of abdomen &pelvis  on 11/7/24:  Persistent severe left-sided hydronephrosis and hydroureter with cortical thinning of the left kidney. However previously noted larger distal left ureteral stone has resolved with residual much smaller 4 mm stone in the distal left ureter as described. 2. Worsening bibasilar bronchiectasis and interstitial thickening and ground-glass infiltrates.      6.  Hyperlipidemia continue with current medications     Reviewed all labs and imaging studies and discussed the plan of treatment with patient in detail.  Discussed with patient's family, his  wife and daughter was in the room and they are aware of patient's worsening condition and evaluate for hospice care.  Acute pulmonary edema     Acute on chronic hypoxic respiratory failure (Multi)     Interstitial lung disease (Multi)     Acute metabolic encephalopathy     Discharge today.     I spent 35 minutes in the professional and overall care of this patient.          Pertinent Physical Exam At Time of Discharge  Visit Vitals  /54 (BP Location: Right leg, Patient Position: Lying)   Pulse 60   Temp 34.8 °C (94.7 °F) (Temporal)   Resp 19   Ht 1.651 m (5' 5\")   Wt 76.7 kg (169 lb)   SpO2 98%   BMI 28.12 kg/m²   Smoking Status Former   BSA 1.88 m²   GENERAL:   lethargic, moderate distress, cooperative, afebrile  SKIN:  Skin color, texture, turgor normal. No rashes or lesions.  OROPHARYNX:  Lips, mucosa, and tongue are normal.Teeth and gums, normal. Oropharynx normal.  NECK:  No jugulovenous distention, No carotid bruits, Carotid pulse normal contour, Supple  LUNGS: Generalized diminished air entry with bilateral crackles and wheezes posteriorly, no pleural rub.  CARDIAC: Rate and rhythm regular, " normal S1 and S2; no rubs,  or gallops, 2/6 systolic ejection murmur left sternal border, permanent pacemaker left upper chest.  ABDOMEN:  Abdomen soft, non-tender, BS normal, No masses or organomegaly  EXTREMETIES:  Extremities normal, no deformities, edema, clubbing or skin discoloration. Good capillary refill., No ulcers  NEURO:  Alert, oriented X 3, Gait not examined.. Non-focal. Reflexes normal and symmetric. Sensation grossly intact., Cranial nerves II-XII intact  PULSES:  2+ radial, 2+ carotid   Renal function panel   Result Value Ref Range     Glucose 130 (H) 74 - 99 mg/dL     Sodium 148 (H) 136 - 145 mmol/L     Potassium 5.0 3.5 - 5.3 mmol/L     Chloride 120 (H) 98 - 107 mmol/L     Bicarbonate 19 (L) 21 - 32 mmol/L     Anion Gap 14 10 - 20 mmol/L     Urea Nitrogen 81 (H) 6 - 23 mg/dL     Creatinine 4.57 (H) 0.50 - 1.30 mg/dL     eGFR 12 (L) >60 mL/min/1.73m*2     Calcium 9.4 8.6 - 10.3 mg/dL     Phosphorus 4.7 2.5 - 4.9 mg/dL     Albumin 3.6 3.4 - 5.0 g/dL   POCT GLUCOSE   Result Value Ref Range     POCT Glucose 190 (H) 74 - 99 mg/dL   POCT GLUCOSE   Result Value Ref Range     POCT Glucose 275 (H) 74 - 99 mg/dL   Renal Function Panel   Result Value Ref Range     Glucose 192 (H) 74 - 99 mg/dL     Sodium 146 (H) 136 - 145 mmol/L     Potassium 5.0 3.5 - 5.3 mmol/L     Chloride 120 (H) 98 - 107 mmol/L     Bicarbonate 17 (L) 21 - 32 mmol/L     Anion Gap 14 10 - 20 mmol/L     Urea Nitrogen 76 (H) 6 - 23 mg/dL     Creatinine 4.13 (H) 0.50 - 1.30 mg/dL     eGFR 14 (L) >60 mL/min/1.73m*2     Calcium 8.8 8.6 - 10.3 mg/dL     Phosphorus 4.6 2.5 - 4.9 mg/dL     Albumin 3.3 (L) 3.4 - 5.0 g/dL   CBC   Result Value Ref Range     WBC 12.4 (H) 4.4 - 11.3 x10*3/uL     nRBC 0.0 0.0 - 0.0 /100 WBCs     RBC 3.34 (L) 4.50 - 5.90 x10*6/uL     Hemoglobin 11.0 (L) 13.5 - 17.5 g/dL     Hematocrit 35.7 (L) 41.0 - 52.0 %      (H) 80 - 100 fL     MCH 32.9 26.0 - 34.0 pg     MCHC 30.8 (L) 32.0 - 36.0 g/dL     RDW 19.0 (H) 11.5 -  14.5 %     Platelets 93 (L) 150 - 450 x10*3/uL   POCT GLUCOSE   Result Value Ref Range     POCT Glucose 157 (H) 74 - 99 mg/dL   POCT GLUCOSE   Result Value Ref Range     POCT Glucose 116 (H) 74 - 99 mg/dL   POCT GLUCOSE   Result Value Ref Range     POCT Glucose 75 74 - 99 mg/dL   CBC   Result Value Ref Range     WBC 9.0 4.4 - 11.3 x10*3/uL     nRBC 0.0 0.0 - 0.0 /100 WBCs     RBC 3.44 (L) 4.50 - 5.90 x10*6/uL     Hemoglobin 11.0 (L) 13.5 - 17.5 g/dL     Hematocrit 35.2 (L) 41.0 - 52.0 %      (H) 80 - 100 fL     MCH 32.0 26.0 - 34.0 pg     MCHC 31.3 (L) 32.0 - 36.0 g/dL     RDW 19.1 (H) 11.5 - 14.5 %     Platelets 103 (L) 150 - 450 x10*3/uL   Basic Metabolic Panel   Result Value Ref Range     Glucose 81 74 - 99 mg/dL     Sodium 148 (H) 136 - 145 mmol/L     Potassium 5.0 3.5 - 5.3 mmol/L     Chloride 120 (H) 98 - 107 mmol/L     Bicarbonate 20 (L) 21 - 32 mmol/L     Anion Gap 13 10 - 20 mmol/L     Urea Nitrogen 71 (H) 6 - 23 mg/dL     Creatinine 3.87 (H) 0.50 - 1.30 mg/dL     eGFR 15 (L) >60 mL/min/1.73m*2     Calcium 8.8 8.6 - 10.3 mg/dL   CBC   Result Value Ref Range     WBC 10.0 4.4 - 11.3 x10*3/uL     nRBC 0.4 (H) 0.0 - 0.0 /100 WBCs     RBC 3.43 (L) 4.50 - 5.90 x10*6/uL     Hemoglobin 11.2 (L) 13.5 - 17.5 g/dL     Hematocrit 35.2 (L) 41.0 - 52.0 %      (H) 80 - 100 fL     MCH 32.7 26.0 - 34.0 pg     MCHC 31.8 (L) 32.0 - 36.0 g/dL     RDW 19.4 (H) 11.5 - 14.5 %     Platelets 100 (L) 150 - 450 x10*3/uL   Basic Metabolic Panel   Result Value Ref Range     Glucose 81 74 - 99 mg/dL     Sodium 151 (H) 136 - 145 mmol/L     Potassium 5.3 3.5 - 5.3 mmol/L     Chloride 123 (H) 98 - 107 mmol/L     Bicarbonate 20 (L) 21 - 32 mmol/L     Anion Gap 13 10 - 20 mmol/L     Urea Nitrogen 80 (H) 6 - 23 mg/dL     Creatinine 4.14 (H) 0.50 - 1.30 mg/dL     eGFR 14 (L) >60 mL/min/1.73m*2     Calcium 8.8 8.6 - 10.3 mg/dL   Urinalysis with Reflex Culture and Microscopic   Result Value Ref Range     Color, Urine Light-Brown  (N) Light-Yellow, Yellow, Dark-Yellow     Appearance, Urine Turbid (N) Clear     Specific Gravity, Urine 1.026 1.005 - 1.035     pH, Urine 6.0 5.0, 5.5, 6.0, 6.5, 7.0, 7.5, 8.0     Protein, Urine 70 (1+) (A) NEGATIVE, 10 (TRACE), 20 (TRACE) mg/dL     Glucose, Urine 30 (TRACE) (A) Normal mg/dL     Blood, Urine OVER (3+) (A) NEGATIVE     Ketones, Urine TRACE (A) NEGATIVE mg/dL     Bilirubin, Urine NEGATIVE NEGATIVE     Urobilinogen, Urine Normal Normal mg/dL     Nitrite, Urine NEGATIVE NEGATIVE     Leukocyte Esterase, Urine 500 Evi/µL (A) NEGATIVE   Microscopic Only, Urine   Result Value Ref Range     WBC, Urine >50 (A) 1-5, NONE /HPF     RBC, Urine >20 (A) NONE, 1-2, 3-5 /HPF     Bacteria, Urine 1+ (A) NONE SEEN /HPF   ECG 12 lead     Result Date: 11/9/2024  Atrial-paced rhythm with prolonged AV conduction Right bundle branch block Abnormal ECG When compared with ECG of 02-OCT-2024 10:58, (unconfirmed) No significant change was found See ED provider note for full interpretation and clinical correlation Confirmed by Rosa Rocha ([id number]) on 11/9/2024 11:48:27 AM     XR chest 1 view     Result Date: 11/7/2024  Interpreted By:  Marek Fenton, STUDY: XR CHEST 1 VIEW   INDICATION: Signs/Symptoms:worsening hypoxia.   COMPARISON: November 5, 2024   ACCESSION NUMBER(S): [id number]   ORDERING CLINICIAN: ALFREDA HORN   FINDINGS: Cardiomegaly with pacemaker.   Interstitial changes throughout both lungs right worse than left favoring chronic interstitial disease.   Overall appearance unchanged from prior.          Interstitial changes throughout both lungs right worse than left favoring chronic interstitial disease.   Overall appearance unchanged from prior.   Signed by: Marek Fenton 11/7/2024 11:36 AM Dictation workstation:   [id number]     CT abdomen pelvis wo IV contrast     Result Date: 11/6/2024  Interpreted By:  Chase Rivera, STUDY: CT ABDOMEN PELVIS WO IV CONTRAST;  11/6/2024 2:59 pm   INDICATION:  Signs/Symptoms:nephrolithiasis and history ogf Prostate carcinoma.     COMPARISON: 07/26/2023   ACCESSION NUMBER(S): [id number]   ORDERING CLINICIAN: YUDELKA HANKINS   TECHNIQUE: CT of the abdomen and pelvis was performed. Contiguous axial images were obtained at 3 mm slice thickness through the abdomen and pelvis. Coronal and sagittal reconstructions at 3 mm slice thickness were performed.  No intravenous contrast was administered.   FINDINGS: Please note that the evaluation of vessels, lymph nodes and organs is limited without intravenous contrast.   LOWER CHEST: The included lung bases demonstrate bibasilar bronchiectasis. Bibasilar infiltrates and interstitial thickening. Findings have progressed slightly since the prior exam. Cardiac pacer.   ABDOMEN:   LIVER: Grossly unremarkable. No definite focal liver lesions.   BILE DUCTS: No bile duct dilatation.   GALLBLADDER: Suboptimally distended and evaluated. No definite gallstones.   PANCREAS: Unremarkable pancreas.   SPLEEN: Unremarkable spleen.   ADRENAL GLANDS: Grossly unremarkable.   KIDNEYS AND URETERS: Cortical thinning of the left kidney with severe left-sided hydronephrosis and hydroureter. Nonobstructing left renal stones. There is a tiny stone within the dependent portion of the dilated distal left ureter measuring approximately 4.5 mm. Also another punctate 2 mm stone near the left UVJ. The larger previously noted distal left ureteral stone is no longer seen.   PELVIS:   BLADDER: Grossly unremarkable.   REPRODUCTIVE ORGANS: Calcifications within the prostate.       ABDOMINAL WALL: Fat containing left inguinal hernia.   BONES: Discogenic degenerative changes. Scoliosis.   No ascites.No retroperitoneal adenopathy.        1.  Persistent severe left-sided hydronephrosis and hydroureter with cortical thinning of the left kidney. However previously noted larger distal left ureteral stone has resolved with residual much smaller 4 mm stone in the distal left  ureter as described. 2. Worsening bibasilar bronchiectasis and interstitial thickening and ground-glass infiltrates.     MACRO: None   Signed by: Chase Rivera 11/6/2024 4:17 PM Dictation workstation:   [id number]     XR chest 2 views     Result Date: 11/5/2024  Interpreted By:  Pepe Scruggs, STUDY: XR CHEST 2 VIEWS;  11/5/2024 7:00 pm   INDICATION: Signs/Symptoms:cough.     COMPARISON: Radiographs of the chest dated 08/25/2024.   ACCESSION NUMBER(S): [id number]   ORDERING CLINICIAN: GEORGE LOPEZ   FINDINGS: PA and lateral radiographs of the chest were provided.   Dual lead left subclavian pacemaker is in place.   CARDIOMEDIASTINAL SILHOUETTE: Cardiomediastinal silhouette is mildly enlarged, similar in appearance to prior exam.   LUNGS: There is redemonstration of the interstitial prominence in the lungs bilaterally, likely representing component of chronic interstitial lung disease, with some increase in airspace opacities in the right upper lung compared to prior study on 08/24/2024. No evidence of pneumothorax or sizable pleural effusion.   ABDOMEN: No remarkable upper abdominal findings.   BONES: No acute osseous changes.        1.  Somewhat increased interstitial markings in the right upper lung compared to prior exam on 08/25/2024, possibly representing developing airspace infiltrate. 2. Prominence of the interstitial markings in the lungs bilaterally, likely representing component of chronic interstitial lung disease.   MACRO: None   Signed by: Pepe Scruggs 11/5/2024 7:12 PM Dictation workstation:   [id number]               Outpatient Follow-Up  Future Appointments   Date Time Provider Department Center   11/19/2024  7:00 AM Maximilian Diez MD [id number] [facility]   5/7/2025 11:00 AM Lizzy Quijano, APRN-CNP [id number] [facility]         Rome Kerns MD

## 2024-11-13 NOTE — PROGRESS NOTES
"Antonio Willis is a 79 y.o. male on day 8 of admission presenting with Pneumonia of right upper lobe due to infectious organism.    Subjective   Symptoms (0 - 10, Best to Worst)  Basin Symptom Assessment System  0-10 (Numeric) Pain Score: 0 - No pain  Obtunded, responds in incoherent words or single words. I have concern that he has pain related to his nephrolithiasis.        Objective     Physical Exam    Physical Exam  Constitutional:       Comments: Confused, picking at blanket. More difficult. Asking or trying to get out of bed.   HENT:      Head: Normocephalic and atraumatic.   Eyes:      Extraocular Movements: Extraocular movements intact.      Pupils: Pupils are equal, round, and reactive to light.   Cardiovascular:      Rate and Rhythm: Normal rate and regular rhythm.      Heart sounds: Normal heart sounds.   Pulmonary:      Effort: Pulmonary effort is normal.      Breath sounds: Normal breath sounds.   Abdominal:      General: Abdomen is flat. Bowel sounds are normal. There is no distension.      Palpations: Abdomen is soft.      Tenderness: There is no abdominal tenderness.  There is costovertebral tenderness.  Musculoskeletal:         General: No swelling.      Cervical back: Normal range of motion and neck supple.   Skin:     General: Skin is warm and dry.   Neurological:      Mental Status: He is disoriented.      Comments: Confused, delirious. Visual hallucinations.     Last Recorded Vitals  Blood pressure 120/54, pulse 60, temperature 34.8 °C (94.7 °F), temperature source Temporal, resp. rate 19, height 1.651 m (5' 5\"), weight 76.7 kg (169 lb), SpO2 98%.  Intake/Output last 3 Shifts:  I/O last 3 completed shifts:  In: 50 (0.7 mL/kg) [IV Piggyback:50]  Out: 900 (11.7 mL/kg) [Urine:900 (0.3 mL/kg/hr)]  Weight: 76.7 kg     Wt Readings from Last 10 Encounters:   11/05/24 76.7 kg (169 lb)   10/10/24 76.7 kg (169 lb)   10/02/24 76.7 kg (169 lb)   09/18/24 75.8 kg (167 lb)   08/25/24 75.8 kg (167 lb) "   06/13/24 79.9 kg (176 lb 1.6 oz)   08/15/23 79 kg (174 lb 0.9 oz)   07/20/22 79.8 kg (176 lb)   11/08/21 80.7 kg (178 lb)   07/21/21 78 kg (172 lb 0.8 oz)         Relevant Results  Scheduled medications  cholecalciferol, 1,000 Units, oral, Daily  famotidine, 10 mg, oral, q48h  heparin (porcine), 5,000 Units, subcutaneous, q8h PAULETTE  HYDROmorphone, 0.2 mg, intravenous, Once  ipratropium-albuteroL, 3 mL, nebulization, TID  melatonin, 4.5 mg, oral, q PM  OLANZapine, 2.5 mg, oral, Nightly  oxygen, , inhalation, Continuous - Inhalation  polyethylene glycol, 17 g, oral, Daily  sodium bicarbonate, 650 mg, oral, TID  tamsulosin, 0.4 mg, oral, Nightly      Continuous medications     PRN medications  PRN medications: acetaminophen, diazePAM, ondansetron **OR** ondansetron    ECG 12 lead    Result Date: 11/9/2024  Atrial-paced rhythm with prolonged AV conduction Right bundle branch block Abnormal ECG When compared with ECG of 02-OCT-2024 10:58, (unconfirmed) No significant change was found See ED provider note for full interpretation and clinical correlation Confirmed by Rosa Rocha ([id number]) on 11/9/2024 11:48:27 AM    XR chest 1 view    Result Date: 11/7/2024  Interpreted By:  Marek Fenton, STUDY: XR CHEST 1 VIEW   INDICATION: Signs/Symptoms:worsening hypoxia.   COMPARISON: November 5, 2024   ACCESSION NUMBER(S): [id number]   ORDERING CLINICIAN: ALFREDA HORN   FINDINGS: Cardiomegaly with pacemaker.   Interstitial changes throughout both lungs right worse than left favoring chronic interstitial disease.   Overall appearance unchanged from prior.         Interstitial changes throughout both lungs right worse than left favoring chronic interstitial disease.   Overall appearance unchanged from prior.   Signed by: Marek Fenton 11/7/2024 11:36 AM Dictation workstation:   [id number]    CT abdomen pelvis wo IV contrast    Result Date: 11/6/2024  Interpreted By:  Chase Rivera, STUDY: CT ABDOMEN PELVIS WO IV CONTRAST;   11/6/2024 2:59 pm   INDICATION: Signs/Symptoms:nephrolithiasis and history ogf Prostate carcinoma.     COMPARISON: 07/26/2023   ACCESSION NUMBER(S): [id number]   ORDERING CLINICIAN: YUDELKA HANKINS   TECHNIQUE: CT of the abdomen and pelvis was performed. Contiguous axial images were obtained at 3 mm slice thickness through the abdomen and pelvis. Coronal and sagittal reconstructions at 3 mm slice thickness were performed.  No intravenous contrast was administered.   FINDINGS: Please note that the evaluation of vessels, lymph nodes and organs is limited without intravenous contrast.   LOWER CHEST: The included lung bases demonstrate bibasilar bronchiectasis. Bibasilar infiltrates and interstitial thickening. Findings have progressed slightly since the prior exam. Cardiac pacer.   ABDOMEN:   LIVER: Grossly unremarkable. No definite focal liver lesions.   BILE DUCTS: No bile duct dilatation.   GALLBLADDER: Suboptimally distended and evaluated. No definite gallstones.   PANCREAS: Unremarkable pancreas.   SPLEEN: Unremarkable spleen.   ADRENAL GLANDS: Grossly unremarkable.   KIDNEYS AND URETERS: Cortical thinning of the left kidney with severe left-sided hydronephrosis and hydroureter. Nonobstructing left renal stones. There is a tiny stone within the dependent portion of the dilated distal left ureter measuring approximately 4.5 mm. Also another punctate 2 mm stone near the left UVJ. The larger previously noted distal left ureteral stone is no longer seen.   PELVIS:   BLADDER: Grossly unremarkable.   REPRODUCTIVE ORGANS: Calcifications within the prostate.       ABDOMINAL WALL: Fat containing left inguinal hernia.   BONES: Discogenic degenerative changes. Scoliosis.   No ascites.No retroperitoneal adenopathy.       1.  Persistent severe left-sided hydronephrosis and hydroureter with cortical thinning of the left kidney. However previously noted larger distal left ureteral stone has resolved with residual much smaller 4  mm stone in the distal left ureter as described. 2. Worsening bibasilar bronchiectasis and interstitial thickening and ground-glass infiltrates.     MACRO: None   Signed by: Chase Rivera 11/6/2024 4:17 PM Dictation workstation:   [id number]    XR chest 2 views    Result Date: 11/5/2024  Interpreted By:  Pepe Scruggs, STUDY: XR CHEST 2 VIEWS;  11/5/2024 7:00 pm   INDICATION: Signs/Symptoms:cough.     COMPARISON: Radiographs of the chest dated 08/25/2024.   ACCESSION NUMBER(S): [id number]   ORDERING CLINICIAN: GEORGE LOPEZ   FINDINGS: PA and lateral radiographs of the chest were provided.   Dual lead left subclavian pacemaker is in place.   CARDIOMEDIASTINAL SILHOUETTE: Cardiomediastinal silhouette is mildly enlarged, similar in appearance to prior exam.   LUNGS: There is redemonstration of the interstitial prominence in the lungs bilaterally, likely representing component of chronic interstitial lung disease, with some increase in airspace opacities in the right upper lung compared to prior study on 08/24/2024. No evidence of pneumothorax or sizable pleural effusion.   ABDOMEN: No remarkable upper abdominal findings.   BONES: No acute osseous changes.       1.  Somewhat increased interstitial markings in the right upper lung compared to prior exam on 08/25/2024, possibly representing developing airspace infiltrate. 2. Prominence of the interstitial markings in the lungs bilaterally, likely representing component of chronic interstitial lung disease.   MACRO: None   Signed by: Pepe Scruggs 11/5/2024 7:12 PM Dictation workstation:   [id number]     No results found for this or any previous visit (from the past 24 hours).                     Assessment/Plan   IMP:   78 yo M with history of stable MS, CKD4, history of CVA/prostate CA s/p radiation, BPH, PPM in situ, interstitial lung disease in current smoker presenting with acute on chronic respiratory failure. Currently being managed for  copd exacerbation, bacterial PNA and acute pulmonary edema. Goals are optimizing patient for discharge, possibly to SNF and then return home as long as possible. Controlling delirium is priority. She is aware of his decline but does not fully grasp how frail he is. Wife is coming to terms with the fact that she cannot take care of her . Plan is for DC to SNF and then probably LTC. CHRISTIAN on CKD progressing. Respiratory status tenuous with propensity to accumulate fluids on lung. He is still above his baseline oxygen requirement. Palliative is assisting with symptom management. Intermittent agitation and LT sided costovertebral pain are being actively treated today.     11/12/24  Conversation with patient's wife and children Dainaa and Jarrell  They want to prioritize his comfort and alleviate suffering. They are distressed to see him delirious and confused. They would like to pursue hospice and agreed to be admitted for inpatient symptom management.   They had spoken to Dorcas Atwood of [facility] and agreed for DNRCC with pivoting towards comfort model of care.   They are appreciative to treatment team for their help with their loved one.     11/11/24: Conversation with Wife, mEma, and dtrDaiana:  We discussed PEG tube feedings at length and while they might secure nutrition but does not fix the root problem of an ineffective swallow reflex. We agreed that we should promote oral nutrition as much as possible.  She is coming to terms with the fact that she cannot provide enough care for him at home. She has her own health issues w/ multiple joint replacements and would not be able to lift him independently. They are seeking SNF and then LTC once that insurance begins paying out at the end of November.           Plan:  - Discharge to [facility] at 11AM  - Adding hydromorphone 0.2mg IV once for suspected pain related to nephrolithiasis.   - Will start benzodiazapine for agitation, breathlessness.   Diazepam 5mg liquid q8h prn and haldol 1mg IV q4h prn   - Olanzapine 2.5mg nightly    - Will continue to follow.      - Recommend Delirium/Dementia Precautions:       - Minimize use of benzos, opiates, anticholinergics, as these may worsen mental status       - Would use caution with narcotic pain medications       - Would still adequately controlling pain, as uncontrolled pain is also a risk factor for delirium       - Reinforce sleep hygiene; encourage patient to stay awake during the day       - Keep curtains/blinds open during the day and closed at night.       - Would recommend reorienting/redirecting patient as much as possible,        - Aim for consistent staffing, familiar objects, avoiding bright lights and loud noises, etc.       - Can consider melatonin at night before bed.           Provider estimate of survival: 6+ months Barring unexpected event. I would not be surprised with an event within six months.   Patient Prognostic Awareness: Yes - incongruent with provider estimation     Patient/proxy preference for information  Prefers full information     Goals of Care  DNR-Comfort Care as of 11/12/2024       Is the patient hospice-eligible?   Yes  Was a discussion held re hospice services?   yes  Was a decision made re hospice services?  Agreed to hospice transfer. Family agreed to switch to comfort model of care      Other Palliative Support          I spent 50 minutes in the professional and overall care of this patient.          Marek Chao MD

## 2024-11-13 NOTE — PROGRESS NOTES
PALLIATIVE CARE SOCIAL WORK NOTE     Hospice has arranged for pt to transfer to [facility] this morning. 11:00 am transportation requested. Thank you.     BRODIE Amaro

## 2024-11-13 NOTE — PROGRESS NOTES
Physical Therapy                 Therapy Communication Note    Patient Name: Antonio Willis  MRN: [id number]  Department: [place] [address]  Room: [address] [place] [address]  Today's Date: 11/13/2024     Discipline: Physical Therapy    Missed Visit Reason: Missed Visit Reason:  (per discharge planning team pt set to leave at 1100 for hospice house)    Missed Time: Attempt    Comment:

## 2024-11-13 NOTE — SIGNIFICANT EVENT
Patient now DNR Comfort Care and is to be discharged to  Hospice today.  Will sign off at this time; please recall if further assistance is needed.

## 2024-11-13 NOTE — PROGRESS NOTES
11/13/24 0658   Discharge Planning   Who is requesting discharge planning? Provider   Home or Post Acute Services Post acute facilities (Rehab/SNF/etc)   Type of Post Acute Facility Services Other (Comment)   Expected Discharge Disposition [facility]   Does the patient need discharge transport arranged? Yes   RoundTrip coordination needed? Yes   Has discharge transport been arranged? No     11/13/24 0658  Patient to discharge to [facility] today.  Gina Whitehead RN TCC

## 2024-11-14 ENCOUNTER — APPOINTMENT (OUTPATIENT)
Dept: PHYSICAL THERAPY | Facility: CLINIC | Age: 79
End: 2024-11-14
Payer: MEDICARE

## 2024-11-14 LAB — BACTERIA UR CULT: NO GROWTH

## 2024-11-19 ENCOUNTER — APPOINTMENT (OUTPATIENT)
Dept: PRIMARY CARE | Facility: CLINIC | Age: 79
End: 2024-11-19
Payer: MEDICARE

## 2024-12-11 ENCOUNTER — DOCUMENTATION (OUTPATIENT)
Dept: OCCUPATIONAL THERAPY | Facility: CLINIC | Age: 79
End: 2024-12-11
Payer: MEDICARE

## 2024-12-11 NOTE — PROGRESS NOTES
Occupational Therapy    Discharge Summary    Name: Antonio Willis  MRN: [id number]  : 1945  Date: 24    Discharge Summary: OT    Discharge Information: Date of discharge 24, Date of last visit 10/14/24, Date of evaluation 10/3/24, Number of attended visits 2, Referred by Chicho Almanzar MD, and Referred for OT eval and treat; MS and weakness        Rehab Discharge Reason: Patient has not been seen since 10/14/24. Per patent chart, had an ED hospital admission from 24-24.   Will discharge the chart at this time.

## 2024-12-19 ENCOUNTER — DOCUMENTATION (OUTPATIENT)
Dept: PHYSICAL THERAPY | Facility: CLINIC | Age: 79
End: 2024-12-19
Payer: MEDICARE

## 2024-12-19 NOTE — PROGRESS NOTES
Physical Therapy    Discharge Summary    Name: Antonio Willis  MRN: [id number]  : 1945  Date: 24    Discharge Summary: PT    The patient will be discharged at this time due to inactivity. The patient has not been seen for outpatient therapy in 30+ days and has not made contact to reschedule. The patient will require new referral/evaluation to resume therapy in the future.     The patient will be discharged at this time. Please refer to initial evaluation or re-assessment for last goals/objective measures assessment and progress report.    Thank you for this referral. For any questions on this patient’s course of therapy, please call the clinic for clarification.

## 2025-05-07 ENCOUNTER — APPOINTMENT (OUTPATIENT)
Dept: CARDIOLOGY | Facility: HOSPITAL | Age: 80
End: 2025-05-07
Payer: MEDICARE